# Patient Record
Sex: FEMALE | Race: WHITE | NOT HISPANIC OR LATINO | Employment: UNEMPLOYED
[De-identification: names, ages, dates, MRNs, and addresses within clinical notes are randomized per-mention and may not be internally consistent; named-entity substitution may affect disease eponyms.]

---

## 2019-03-04 ENCOUNTER — RECORDS - HEALTHEAST (OUTPATIENT)
Dept: ADMINISTRATIVE | Facility: OTHER | Age: 39
End: 2019-03-04

## 2019-04-05 ENCOUNTER — RECORDS - HEALTHEAST (OUTPATIENT)
Dept: ADMINISTRATIVE | Facility: OTHER | Age: 39
End: 2019-04-05

## 2019-04-30 ENCOUNTER — RECORDS - HEALTHEAST (OUTPATIENT)
Dept: ADMINISTRATIVE | Facility: OTHER | Age: 39
End: 2019-04-30

## 2019-07-05 ENCOUNTER — RECORDS - HEALTHEAST (OUTPATIENT)
Dept: ADMINISTRATIVE | Facility: OTHER | Age: 39
End: 2019-07-05

## 2019-07-09 ENCOUNTER — RECORDS - HEALTHEAST (OUTPATIENT)
Dept: ADMINISTRATIVE | Facility: OTHER | Age: 39
End: 2019-07-09

## 2019-08-01 ENCOUNTER — RECORDS - HEALTHEAST (OUTPATIENT)
Dept: ADMINISTRATIVE | Facility: OTHER | Age: 39
End: 2019-08-01

## 2019-08-07 ENCOUNTER — RECORDS - HEALTHEAST (OUTPATIENT)
Dept: ADMINISTRATIVE | Facility: OTHER | Age: 39
End: 2019-08-07

## 2019-09-10 ENCOUNTER — RECORDS - HEALTHEAST (OUTPATIENT)
Dept: ADMINISTRATIVE | Facility: OTHER | Age: 39
End: 2019-09-10

## 2019-09-18 ENCOUNTER — RECORDS - HEALTHEAST (OUTPATIENT)
Dept: ADMINISTRATIVE | Facility: OTHER | Age: 39
End: 2019-09-18

## 2019-10-28 ENCOUNTER — RECORDS - HEALTHEAST (OUTPATIENT)
Dept: ADMINISTRATIVE | Facility: OTHER | Age: 39
End: 2019-10-28

## 2019-11-12 ENCOUNTER — RECORDS - HEALTHEAST (OUTPATIENT)
Dept: ADMINISTRATIVE | Facility: OTHER | Age: 39
End: 2019-11-12

## 2020-02-10 ENCOUNTER — RECORDS - HEALTHEAST (OUTPATIENT)
Dept: ADMINISTRATIVE | Facility: OTHER | Age: 40
End: 2020-02-10

## 2020-02-28 ENCOUNTER — RECORDS - HEALTHEAST (OUTPATIENT)
Dept: ADMINISTRATIVE | Facility: OTHER | Age: 40
End: 2020-02-28

## 2020-03-31 ENCOUNTER — RECORDS - HEALTHEAST (OUTPATIENT)
Dept: ADMINISTRATIVE | Facility: OTHER | Age: 40
End: 2020-03-31

## 2020-04-06 ENCOUNTER — RECORDS - HEALTHEAST (OUTPATIENT)
Dept: ADMINISTRATIVE | Facility: OTHER | Age: 40
End: 2020-04-06

## 2020-04-22 ENCOUNTER — RECORDS - HEALTHEAST (OUTPATIENT)
Dept: ADMINISTRATIVE | Facility: OTHER | Age: 40
End: 2020-04-22

## 2021-03-22 ENCOUNTER — OFFICE VISIT (OUTPATIENT)
Dept: FAMILY MEDICINE | Facility: CLINIC | Age: 41
End: 2021-03-22
Payer: COMMERCIAL

## 2021-03-22 VITALS
RESPIRATION RATE: 16 BRPM | HEIGHT: 66 IN | HEART RATE: 66 BPM | DIASTOLIC BLOOD PRESSURE: 66 MMHG | OXYGEN SATURATION: 98 % | BODY MASS INDEX: 27.48 KG/M2 | SYSTOLIC BLOOD PRESSURE: 106 MMHG | WEIGHT: 171 LBS | TEMPERATURE: 97.2 F

## 2021-03-22 DIAGNOSIS — J30.2 SEASONAL ALLERGIC RHINITIS, UNSPECIFIED TRIGGER: ICD-10-CM

## 2021-03-22 DIAGNOSIS — Z13.220 SCREENING CHOLESTEROL LEVEL: ICD-10-CM

## 2021-03-22 DIAGNOSIS — N92.1 MENORRHAGIA WITH IRREGULAR CYCLE: ICD-10-CM

## 2021-03-22 DIAGNOSIS — M06.9 RHEUMATOID ARTHRITIS INVOLVING BOTH HANDS, UNSPECIFIED WHETHER RHEUMATOID FACTOR PRESENT (H): ICD-10-CM

## 2021-03-22 DIAGNOSIS — F79 DISORDER OF INTELLECTUAL DEVELOPMENT: ICD-10-CM

## 2021-03-22 DIAGNOSIS — F43.9 TRAUMA AND STRESSOR-RELATED DISORDER: ICD-10-CM

## 2021-03-22 DIAGNOSIS — E78.00 HIGH CHOLESTEROL: ICD-10-CM

## 2021-03-22 DIAGNOSIS — R73.03 PRE-DIABETES: Primary | ICD-10-CM

## 2021-03-22 DIAGNOSIS — F31.9 BIPOLAR 1 DISORDER (H): ICD-10-CM

## 2021-03-22 DIAGNOSIS — K59.00 CONSTIPATION, UNSPECIFIED CONSTIPATION TYPE: ICD-10-CM

## 2021-03-22 DIAGNOSIS — L40.9 PSORIASIS: ICD-10-CM

## 2021-03-22 PROCEDURE — 90471 IMMUNIZATION ADMIN: CPT | Performed by: FAMILY MEDICINE

## 2021-03-22 PROCEDURE — 99204 OFFICE O/P NEW MOD 45 MIN: CPT | Mod: 25 | Performed by: FAMILY MEDICINE

## 2021-03-22 PROCEDURE — 90715 TDAP VACCINE 7 YRS/> IM: CPT | Performed by: FAMILY MEDICINE

## 2021-03-22 RX ORDER — OLANZAPINE 10 MG/2ML
INJECTION, POWDER, FOR SOLUTION INTRAMUSCULAR
COMMUNITY
Start: 2020-12-02 | End: 2021-03-22

## 2021-03-22 RX ORDER — METHOTREXATE 2.5 MG/1
TABLET ORAL
COMMUNITY
Start: 2021-03-04 | End: 2021-03-31

## 2021-03-22 RX ORDER — MOMETASONE FUROATE 1 MG/ML
SOLUTION TOPICAL DAILY
Qty: 60 ML | Refills: 11 | Status: SHIPPED | OUTPATIENT
Start: 2021-03-22 | End: 2021-03-29

## 2021-03-22 RX ORDER — LORAZEPAM 0.5 MG/1
0.5 TABLET ORAL EVERY MORNING
COMMUNITY
Start: 2021-03-04 | End: 2021-03-22

## 2021-03-22 RX ORDER — TRIAMCINOLONE ACETONIDE 1 MG/G
OINTMENT TOPICAL
COMMUNITY
Start: 2021-02-16 | End: 2021-03-22

## 2021-03-22 RX ORDER — BENZOCAINE/MENTHOL 6 MG-10 MG
LOZENGE MUCOUS MEMBRANE
COMMUNITY
Start: 2021-03-02 | End: 2021-03-29

## 2021-03-22 RX ORDER — LORAZEPAM 0.5 MG/1
0.5 TABLET ORAL DAILY PRN
COMMUNITY
Start: 2021-03-22 | End: 2021-07-21

## 2021-03-22 RX ORDER — TRIAMCINOLONE ACETONIDE 1 MG/G
OINTMENT TOPICAL 3 TIMES DAILY
Qty: 80 G | Refills: 4 | Status: SHIPPED | OUTPATIENT
Start: 2021-03-22 | End: 2021-03-29

## 2021-03-22 RX ORDER — APREMILAST 30 MG/1
TABLET, FILM COATED ORAL
COMMUNITY
Start: 2020-07-15 | End: 2021-07-21

## 2021-03-22 RX ORDER — MULTIVIT-MIN/FA/LYCOPEN/LUTEIN .4-300-25
1 TABLET ORAL DAILY
COMMUNITY
Start: 2021-02-27 | End: 2021-03-24

## 2021-03-22 RX ORDER — CETIRIZINE HYDROCHLORIDE 10 MG/1
10 TABLET ORAL DAILY
Qty: 30 TABLET | Refills: 11 | Status: SHIPPED | OUTPATIENT
Start: 2021-03-22 | End: 2021-03-26

## 2021-03-22 RX ORDER — LORAZEPAM 2 MG/1
2 TABLET ORAL AT BEDTIME
COMMUNITY
Start: 2021-03-04 | End: 2021-04-02

## 2021-03-22 RX ORDER — PROPRANOLOL HYDROCHLORIDE 20 MG/1
20 TABLET ORAL 3 TIMES DAILY
COMMUNITY
Start: 2021-03-02 | End: 2021-03-31

## 2021-03-22 RX ORDER — DROSPIRENONE AND ETHINYL ESTRADIOL TABLETS 0.02-3(28)
1 KIT ORAL DAILY
COMMUNITY
Start: 2021-03-02 | End: 2021-03-22

## 2021-03-22 RX ORDER — POLYETHYLENE GLYCOL 3350 17 G/17G
POWDER, FOR SOLUTION ORAL
Qty: 578 G | Refills: 1 | Status: SHIPPED | OUTPATIENT
Start: 2021-03-22 | End: 2021-03-26

## 2021-03-22 RX ORDER — OLANZAPINE 15 MG/1
TABLET ORAL
COMMUNITY
Start: 2021-03-02 | End: 2021-03-31

## 2021-03-22 RX ORDER — POLYETHYLENE GLYCOL 3350 17 G/17G
POWDER, FOR SOLUTION ORAL
COMMUNITY
Start: 2021-03-02 | End: 2021-03-22

## 2021-03-22 RX ORDER — FLUOXETINE 40 MG/1
20 CAPSULE ORAL DAILY
COMMUNITY
Start: 2021-03-02 | End: 2021-10-19

## 2021-03-22 RX ORDER — OLANZAPINE 10 MG/1
TABLET ORAL
COMMUNITY
Start: 2021-03-02 | End: 2021-03-31

## 2021-03-22 RX ORDER — DROSPIRENONE AND ETHINYL ESTRADIOL TABLETS 0.02-3(28)
1 KIT ORAL DAILY
Qty: 84 TABLET | Refills: 4 | Status: SHIPPED | OUTPATIENT
Start: 2021-03-22 | End: 2021-03-26

## 2021-03-22 RX ORDER — OLANZAPINE 20 MG/1
TABLET ORAL
COMMUNITY
Start: 2020-06-22 | End: 2021-03-22

## 2021-03-22 RX ORDER — SIMVASTATIN 20 MG
20 TABLET ORAL AT BEDTIME
Qty: 30 TABLET | Refills: 11 | Status: SHIPPED | OUTPATIENT
Start: 2021-03-22 | End: 2021-03-26

## 2021-03-22 SDOH — HEALTH STABILITY: MENTAL HEALTH: HOW OFTEN DO YOU HAVE A DRINK CONTAINING ALCOHOL?: NEVER

## 2021-03-22 ASSESSMENT — MIFFLIN-ST. JEOR: SCORE: 1452.4

## 2021-03-22 NOTE — NURSING NOTE
Prior to immunization administration, verified patients identity using patient s name and date of birth. Please see Immunization Activity for additional information.     Screening Questionnaire for Adult Immunization    Are you sick today?   No   Do you have allergies to medications, food, a vaccine component or latex?   No   Have you ever had a serious reaction after receiving a vaccination?   No   Do you have a long-term health problem with heart, lung, kidney, or metabolic disease (e.g., diabetes), asthma, a blood disorder, no spleen, complement component deficiency, a cochlear implant, or a spinal fluid leak?  Are you on long-term aspirin therapy?   No   Do you have cancer, leukemia, HIV/AIDS, or any other immune system problem?   No   Do you have a parent, brother, or sister with an immune system problem?   No   In the past 3 months, have you taken medications that affect  your immune system, such as prednisone, other steroids, or anticancer drugs; drugs for the treatment of rheumatoid arthritis, Crohn s disease, or psoriasis; or have you had radiation treatments?   No   Have you had a seizure, or a brain or other nervous system problem?   No   During the past year, have you received a transfusion of blood or blood    products, or been given immune (gamma) globulin or antiviral drug?   No   For women: Are you pregnant or is there a chance you could become       pregnant during the next month?   No   Have you received any vaccinations in the past 4 weeks?   No     Immunization questionnaire answers were all negative.        Per orders of Dr. Loaiza, injection of TDAP given by Samara Cash CMA. Patient instructed to remain in clinic for 15 minutes afterwards, and to report any adverse reaction to me immediately.       Screening performed by Samara Cash CMA on 3/22/2021 at 12:16 PM.

## 2021-03-22 NOTE — PATIENT INSTRUCTIONS
Waiting list or schedule for COVID vaccine.    Schedule fasting labs [phone]    Walk at home exercise videos    Encourage 30 min 5 days a week (150min a week).    Rheumatology will call you to schedule.    Decrease ativan to 0.5mg daily if needed.    Decrease miralax to 1/2 capful in 4 oz at bedtime.    Cut out juices and pop (sugar); flavored sanford or carbonated water. Decrease creamer and sugar in coffee.        Thank you for your questions about the COVID Vaccine!  It is very important that when your time comes that you should be ready and get your vaccine.    Please visit our webpage for the most up to date information on vaccine availability.  [email or web address]    Why should I get a COVID-19 vaccine?  While we have made some progress in the fight against COVID-19 with public health measures like masking and social distancing, widespread vaccination is the only way that we can stop the pandemic. Not only does getting the vaccine protect you against COVID-19, it also reduces the chances that you will spread it to others, including your family and friends    Can I get COVID-19 from the vaccine? Does it use a live virus?  No, it is not possible to get COVID-19 from the vaccine. The Pfizer and Moderna vaccines use only genetic material from the virus while other vaccines being studied use inactivated versions of the virus. None of these can cause COVID-19.     Is it safe to receive a COVID-19 vaccine? How effective are they?  Two COVID-19 vaccines produced by Pfizer[employer] and Moderna are both safe and more than 94 percent effective, according to the results of two large clinical trials. Both the Pfizer/BioNTech and Moderna vaccines have already received U.S. Food and Drug Administration (FDA) emergency use authorization, allowing them to be used in the [place].    How do the Pfizer/BioNTech and Moderna vaccines work and how many doses do I need?  The Pfizer/[employer]  and Moderna vaccines do not use live or weakened versions of the coronavirus causing COVID-19. Instead, these vaccines have genetic material called mRNA or  messenger RNA  that is taken from the virus. Both vaccines come in two doses. Once you receive both doses of the vaccine, it will likely take several weeks for your body to develop immunity.    What are the side effects?  During the clinical trials, only mild to moderate flu-like side effects which are part of the immune response were reported, including a headache, fatigue, chills, fever, and muscle and joint soreness. Side effects are more likely to occur after the second dose. Most of these symptoms ended three days after the vaccine, or earlier.     If I had COVID-19 should I get the vaccine?  Yes. People who have tested positive for COVID-19 produce antibodies that offer some protection against the virus, but we don t know enough yet about antibody protection and how long it may last, so we recommend that everyone get the vaccine.      Do I have to continue wearing a mask after I get the vaccine?  Yes. Everyone should wear face masks, wash their hands frequently, practice social distancing, and take other safety steps until more people have received the vaccine, the number of COVID-19 cases nationwide is no longer at pandemic levels, and we understand more about how long these vaccines will protect us.    Patient Education     MyPlate Worksheet: 1,800 Calories    Your calorie needs are about 1,800 calories a day. Below are the USDA guidelines for your daily recommended amount of each food group.   Vegetables 2  cups Fruits 1  cups Grains 6 ounces Dairy 3 cups Protein 5 ounces   Eat a variety of vegetables each day.   Aim for these amounts each week:     1  cups dark green vegetables    5  cups red or orange-colored vegetables    1  cups dry beans and peas    5 cups starchy vegetables    4 cups other vegetables Eat a variety of fruits each day.   Go easy on  fruit juices.   Good choices of fruits include:     Berries    Bananas    Apples    Melon    Dry fruit    Frozen fruit    Canned fruit Choose whole grains whenever you can.   Aim to eat at least 3 ounces of whole grains each day:     Bread    Cereal    Rice    Pasta    Potatoes    Tortillas Choose low-fat or fat-free milk, yogurt, or cheese each day.   Good choices include:     Low-fat or fat-free milk or chocolate milk    Low-fat or fat-free yogurt    Low-fat or fat-free cottage cheese or other reduced-fat cheeses    Calcium-fortified milk alternatives Choose low-fat or lean meats, poultry, fish, and seafood each day.   Vary your protein. Choose more:     Fish and other seafood    Lean low-fat meat and poultry    Eggs    Beans, peas    Tofu    Unsalted nuts and seeds  Choose less high-fat and red meat.    Source: USDA MyPlate, www.choosemyplate.gov   Know your limits on saturated fat, added sugars, and salt     Your allowance for saturated fat is 20 grams a day.    Limit added sugars to 45 grams a day.    Cut back on salt (sodium). Stay under 2,300 mg sodium a day. If you have a health condition such as heart disease or high blood pressure, your doctor will likely tell you to limit sodium to no more than 1,500 mg a day.    Get moving and be active!  Aim for at least 30 minutes of physical activity most days of the week or 150 minutes of moderate exercise a week.   MyPlate servings worksheet: 1,800 calories   This worksheet tells you how many servings you should get each day from each food group, and tells you how much food makes a serving. Use this as a guide as you plan your meals throughout the day. Track your progress daily by writing in what you actually ate.   Food group  Daily MyPlate goal  What you ate today    Vegetables 5 half-cups or 5 servings   One serving is:    cup cut-up raw or cooked vegetables   1 cup raw, leafy vegetables     baked sweet potato     cup vegetable juice   Note: At meals, fill half  your plate with vegetables and fruit.       Fruits 3 half-cups or 3 servings   One serving is:    cup fresh, frozen, or canned fruit   1 medium piece of fruit   1 cup of berries or melon     cup dried fruit     cup 100% fruit juice   Note: Make most choices fruit instead of juice.       Grains 6 servings or 6 ounces   One serving is:  1 slice bread  1 cup dry cereal    cup cooked rice, pasta, or cereal   1 5-inch tortilla   Note: Choose whole grains for at least half of your servings each day.       Dairy 3 servings or 3 cups   One serving is:  1 cup milk  1  ounces reduced-fat hard cheese   2 ounces processed cheese   1 cup low-fat yogurt   1/3 cup shredded cheese   Note: Choose low-fat or fat-free most often.       Protein 5 servings or 5 ounces   One serving is:  1 ounce cooked lean beef, pork, lamb, or ham   1 ounce cooked chicken or turkey (no skin)   1 ounce cooked fish or shellfish (not fried)   1 egg    cup egg substitute     ounce nuts or seeds   1 tablespoon peanut or almond butter     cup cooked dry beans or peas     cup tofu  2 tablespoons hummus       [employer] last reviewed this educational content on 6/1/2020 2000-2020 [employer]. All rights reserved. This information is not intended as a substitute for professional medical care. Always follow your healthcare professional's instructions.

## 2021-03-22 NOTE — PROGRESS NOTES
Assessment & Plan     Pre-diabetes  Recheck, has gained weight in the last year with group home changed, discussed 1800 MyPlate food plan and cutting down sweets.  - Hemoglobin A1c  - Comprehensive metabolic panel (BMP + Alb, Alk Phos, ALT, AST, Total. Bili, TP)    Screening cholesterol level  - Lipid panel reflex to direct LDL Fasting    Rheumatoid arthritis involving both hands, unspecified whether rheumatoid factor present (H)  Following with Rheumatology, needs new referral.  - Comprehensive metabolic panel (BMP + Alb, Alk Phos, ALT, AST, Total. Bili, TP)  - Rheumatology Referral; Future    Constipation, unspecified constipation type  Well controlled, plan to decrease to 1/2 dose daily.  - polyethylene glycol (MIRALAX) 17 GM/Dose powder; Mix 1/2 capful (8.5g) in 4 oz of liquid at bedtime.    Menorrhagia with irregular cycle  Stable, refill  - LORYNA 3-0.02 MG tablet; Take 1 tablet by mouth daily    High cholesterol  Recheck and refill  - simvastatin (ZOCOR) 20 MG tablet; Take 1 tablet (20 mg) by mouth At Bedtime    Seasonal allergic rhinitis, unspecified trigger  Stable, refill  - cetirizine (ZYRTEC) 10 MG tablet; Take 1 tablet (10 mg) by mouth daily    Psoriasis  Stable, refill  - triamcinolone (KENALOG) 0.1 % external ointment; Apply topically 3 times daily  - mometasone (ELOCON) 0.1 % external solution; Apply topically daily Apply to scalp and massage at bedtime and wash out in morning.    Bipolar 1 disorder (H)  Seeing psychiatry at [facility], did change 0.5mg ativan to PRN in orders.  - LORazepam (ATIVAN) 0.5 MG tablet; Take 1 tablet (0.5 mg) by mouth daily as needed for anxiety    Disorder of intellectual development  - LORazepam (ATIVAN) 0.5 MG tablet; Take 1 tablet (0.5 mg) by mouth daily as needed for anxiety    Trauma and stressor-related disorder  - LORazepam (ATIVAN) 0.5 MG tablet; Take 1 tablet (0.5 mg) by mouth daily as needed for anxiety    No prior exam note available so took time adding  "in all diagnosis listed on group home diagnosis sheet and medications. Time spent in exam and discussion and filling out forms for patient.  Assessment requiring an independent historian(s) - group home staff  50 minutes spent on the date of the encounter doing chart review, history and exam, documentation and further activities as noted above  {     BMI:   Estimated body mass index is 27.6 kg/m  as calculated from the following:    Height as of this encounter: 1.676 m (5' 6\").    Weight as of this encounter: 77.6 kg (171 lb).   Weight management plan: Discussed healthy diet and exercise guidelines    See Patient Instructions    Return in about 3 months (around 6/22/2021) for weight recheck.    Nicolas Loaiza MD  [facility] GABRIELLA Holden is a 41 year old who presents for the following health issues  accompanied by her Care team staff:    HPI   Chief Complaint   Patient presents with     Establish Care     Forms   New Patient/Transfer of Care  Weight: Patient states she has gained 21lbs in a month. Patient is worried about her weight. Switched group home at last group home had portion control and no extra sweets, at current group home.  When was 220 lbs was pre-diabetic.    Patient also needs paperwork signed.    Bipolar 1: Randal Wallace for psychiatry provider at [facility].  Is seeing her next month, but patient has concern about sedation with morning ativan 0.5mg dose, taking naps after this often.     Psoriasis: well controlled with topical    Allergies Seasonal: stable with the zyrtec daily    Constipation: a little overly controlled with 1 capful miralax daily.  But patient doesn't want it to be a PRN or she will forget and get constipated.    Menorrhagia with irregular cycle. Well controlled with birth control    Hyperlipidemia Follow-Up   High cholesterol due to antipsychotic medication.    Are you regularly taking any medication or supplement to lower your cholesterol?   " "Yes- zocor    Are you having muscle aches or other side effects that you think could be caused by your cholesterol lowering medication?  No        How many servings of fruits and vegetables do you eat daily?  2-3    On average, how many sweetened beverages do you drink each day (Examples: soda, juice, sweet tea, etc.  Do NOT count diet or artificially sweetened beverages)?   2    How many days per week do you exercise enough to make your heart beat faster? 0    How many minutes a day do you exercise enough to make your heart beat faster? 0    How many days per week do you miss taking your medication? 0      Review of Systems   Constitutional, HEENT, cardiovascular, pulmonary, gi and gu systems are negative, except as otherwise noted.      Objective    /66   Pulse 66   Temp 97.2  F (36.2  C) (Tympanic)   Resp 16   Ht 1.676 m (5' 6\")   Wt 77.6 kg (171 lb)   LMP 03/19/2021 (Exact Date)   SpO2 98%   BMI 27.60 kg/m    Body mass index is 27.6 kg/m .  Physical Exam   GENERAL: healthy, alert and no distress  HENT: ear canals and TM's normal, nose and mouth without ulcers or lesions  NECK: no adenopathy, no asymmetry, masses, or scars and thyroid normal to palpation  RESP: lungs clear to auscultation - no rales, rhonchi or wheezes  CV: regular rate and rhythm, normal S1 S2, no S3 or S4, no murmur, click or rub, no peripheral edema and peripheral pulses strong  ABDOMEN: soft, nontender, no hepatosplenomegaly, no masses and bowel sounds normal  MS: no gross musculoskeletal defects noted, no edema  SKIN: no suspicious lesions or rashes  PSYCH: mentation appears normal, affect normal/bright                "

## 2021-03-24 DIAGNOSIS — Z78.9 TAKES DIETARY SUPPLEMENTS: Primary | ICD-10-CM

## 2021-03-24 DIAGNOSIS — N92.1 MENORRHAGIA WITH IRREGULAR CYCLE: ICD-10-CM

## 2021-03-24 DIAGNOSIS — J30.2 SEASONAL ALLERGIC RHINITIS, UNSPECIFIED TRIGGER: ICD-10-CM

## 2021-03-24 DIAGNOSIS — F31.9 BIPOLAR 1 DISORDER (H): ICD-10-CM

## 2021-03-24 DIAGNOSIS — F79 DISORDER OF INTELLECTUAL DEVELOPMENT: ICD-10-CM

## 2021-03-24 DIAGNOSIS — E78.00 HIGH CHOLESTEROL: ICD-10-CM

## 2021-03-24 DIAGNOSIS — F43.9 TRAUMA AND STRESSOR-RELATED DISORDER: ICD-10-CM

## 2021-03-24 DIAGNOSIS — K59.00 CONSTIPATION, UNSPECIFIED CONSTIPATION TYPE: ICD-10-CM

## 2021-03-24 DIAGNOSIS — L40.9 PSORIASIS: ICD-10-CM

## 2021-03-24 NOTE — TELEPHONE ENCOUNTER
margarita from [facility] reports patient's medications were sent to wrong pharmacy, and pharmacy is requesting a   med list for refills.  Fax  [phone]    Needs order for folic acid 1mg;  Not on med list.         Tracey CHILD  Station

## 2021-03-26 RX ORDER — CETIRIZINE HYDROCHLORIDE 10 MG/1
10 TABLET ORAL DAILY
Qty: 30 TABLET | Refills: 11 | Status: SHIPPED | OUTPATIENT
Start: 2021-03-26 | End: 2022-02-08

## 2021-03-26 RX ORDER — POLYETHYLENE GLYCOL 3350 17 G/17G
POWDER, FOR SOLUTION ORAL
Qty: 578 G | Refills: 1 | Status: SHIPPED | OUTPATIENT
Start: 2021-03-26 | End: 2021-04-23

## 2021-03-26 RX ORDER — DROSPIRENONE AND ETHINYL ESTRADIOL TABLETS 0.02-3(28)
1 KIT ORAL DAILY
Qty: 84 TABLET | Refills: 4 | Status: SHIPPED | OUTPATIENT
Start: 2021-03-26 | End: 2021-07-21

## 2021-03-26 RX ORDER — SIMVASTATIN 20 MG
20 TABLET ORAL AT BEDTIME
Qty: 30 TABLET | Refills: 11 | Status: SHIPPED | OUTPATIENT
Start: 2021-03-26 | End: 2022-02-08

## 2021-03-26 NOTE — TELEPHONE ENCOUNTER
Routed to Dr Loaiza.    Wrong pharmacy.  Pt is using [facility].    Prescriptions resent for simvastastin, cetirizine, Lryna, Miralax.    Others do not meet RN refill protocol and some look like they are being filled by a mental health provider.    I cued up folic acid as requested too.    Madie Dalton RN

## 2021-03-29 DIAGNOSIS — M06.9 RHEUMATOID ARTHRITIS INVOLVING BOTH HANDS, UNSPECIFIED WHETHER RHEUMATOID FACTOR PRESENT (H): ICD-10-CM

## 2021-03-29 DIAGNOSIS — Z13.220 SCREENING CHOLESTEROL LEVEL: ICD-10-CM

## 2021-03-29 DIAGNOSIS — R73.03 PRE-DIABETES: ICD-10-CM

## 2021-03-29 LAB
ALBUMIN SERPL-MCNC: 3.4 G/DL (ref 3.4–5)
ALP SERPL-CCNC: 93 U/L (ref 40–150)
ALT SERPL W P-5'-P-CCNC: 25 U/L (ref 0–50)
ANION GAP SERPL CALCULATED.3IONS-SCNC: 5 MMOL/L (ref 3–14)
AST SERPL W P-5'-P-CCNC: 16 U/L (ref 0–45)
BILIRUB SERPL-MCNC: 0.3 MG/DL (ref 0.2–1.3)
BUN SERPL-MCNC: 19 MG/DL (ref 7–30)
CALCIUM SERPL-MCNC: 9.6 MG/DL (ref 8.5–10.1)
CHLORIDE SERPL-SCNC: 105 MMOL/L (ref 94–109)
CHOLEST SERPL-MCNC: 200 MG/DL
CO2 SERPL-SCNC: 25 MMOL/L (ref 20–32)
CREAT SERPL-MCNC: 0.71 MG/DL (ref 0.52–1.04)
GFR SERPL CREATININE-BSD FRML MDRD: >90 ML/MIN/{1.73_M2}
GLUCOSE SERPL-MCNC: 86 MG/DL (ref 70–99)
HBA1C MFR BLD: 5.2 % (ref 0–5.6)
HDLC SERPL-MCNC: 104 MG/DL
LDLC SERPL CALC-MCNC: 69 MG/DL
NONHDLC SERPL-MCNC: 96 MG/DL
POTASSIUM SERPL-SCNC: 4.4 MMOL/L (ref 3.4–5.3)
PROT SERPL-MCNC: 7.6 G/DL (ref 6.8–8.8)
SODIUM SERPL-SCNC: 135 MMOL/L (ref 133–144)
TRIGL SERPL-MCNC: 134 MG/DL

## 2021-03-29 PROCEDURE — 80053 COMPREHEN METABOLIC PANEL: CPT | Performed by: FAMILY MEDICINE

## 2021-03-29 PROCEDURE — 80061 LIPID PANEL: CPT | Performed by: FAMILY MEDICINE

## 2021-03-29 PROCEDURE — 83036 HEMOGLOBIN GLYCOSYLATED A1C: CPT | Performed by: FAMILY MEDICINE

## 2021-03-29 PROCEDURE — 36415 COLL VENOUS BLD VENIPUNCTURE: CPT | Performed by: FAMILY MEDICINE

## 2021-03-29 RX ORDER — METHOTREXATE 2.5 MG/1
TABLET ORAL
Status: CANCELLED | OUTPATIENT
Start: 2021-03-29

## 2021-03-29 RX ORDER — FLUOXETINE 40 MG/1
80 CAPSULE ORAL DAILY
Status: CANCELLED | OUTPATIENT
Start: 2021-03-29

## 2021-03-29 RX ORDER — PROPRANOLOL HYDROCHLORIDE 20 MG/1
20 TABLET ORAL 3 TIMES DAILY
Qty: 90 TABLET | Refills: 11 | Status: CANCELLED | OUTPATIENT
Start: 2021-03-29

## 2021-03-29 RX ORDER — LORAZEPAM 2 MG/1
2 TABLET ORAL AT BEDTIME
Status: CANCELLED | OUTPATIENT
Start: 2021-03-29

## 2021-03-29 RX ORDER — BENZOCAINE/MENTHOL 6 MG-10 MG
LOZENGE MUCOUS MEMBRANE
Qty: 15 G | Refills: 11 | Status: SHIPPED | OUTPATIENT
Start: 2021-03-29 | End: 2021-10-19

## 2021-03-29 RX ORDER — TRIAMCINOLONE ACETONIDE 1 MG/G
OINTMENT TOPICAL 3 TIMES DAILY
Qty: 80 G | Refills: 4 | Status: SHIPPED | OUTPATIENT
Start: 2021-03-29 | End: 2021-09-14

## 2021-03-29 RX ORDER — MOMETASONE FUROATE 1 MG/ML
SOLUTION TOPICAL DAILY
Qty: 60 ML | Refills: 11 | Status: SHIPPED | OUTPATIENT
Start: 2021-03-29 | End: 2022-06-13

## 2021-03-29 RX ORDER — LORAZEPAM 0.5 MG/1
0.5 TABLET ORAL DAILY PRN
Status: CANCELLED | OUTPATIENT
Start: 2021-03-29

## 2021-03-29 RX ORDER — OLANZAPINE 15 MG/1
TABLET ORAL
Status: CANCELLED | OUTPATIENT
Start: 2021-03-29

## 2021-03-29 RX ORDER — OLANZAPINE 10 MG/1
TABLET ORAL
Status: CANCELLED | OUTPATIENT
Start: 2021-03-29

## 2021-03-29 RX ORDER — MULTIVIT-MIN/FA/LYCOPEN/LUTEIN .4-300-25
1 TABLET ORAL DAILY
Qty: 100 TABLET | Refills: 2 | Status: SHIPPED | OUTPATIENT
Start: 2021-03-29 | End: 2021-07-21

## 2021-03-29 RX ORDER — APREMILAST 30 MG/1
TABLET, FILM COATED ORAL
Status: CANCELLED | OUTPATIENT
Start: 2021-03-29

## 2021-03-29 RX ORDER — FOLIC ACID 1 MG/1
1 TABLET ORAL DAILY
Qty: 90 TABLET | Refills: 4 | Status: SHIPPED | OUTPATIENT
Start: 2021-03-29 | End: 2021-07-26

## 2021-03-29 NOTE — TELEPHONE ENCOUNTER
I sent the medication that I can, mostly creams and vitamins.  Patient will need psychiatry to send the mood medication and Rheum to send methotrexate and otezla.  Thank you,  Nicolas Loaiza MD

## 2021-03-31 ENCOUNTER — TELEPHONE (OUTPATIENT)
Dept: FAMILY MEDICINE | Facility: CLINIC | Age: 41
End: 2021-03-31

## 2021-03-31 DIAGNOSIS — F31.9 BIPOLAR 1 DISORDER (H): Primary | ICD-10-CM

## 2021-03-31 DIAGNOSIS — M06.9 RHEUMATOID ARTHRITIS INVOLVING BOTH HANDS, UNSPECIFIED WHETHER RHEUMATOID FACTOR PRESENT (H): ICD-10-CM

## 2021-03-31 DIAGNOSIS — L40.9 PSORIASIS: ICD-10-CM

## 2021-03-31 RX ORDER — OLANZAPINE 10 MG/1
10 TABLET ORAL EVERY MORNING
Qty: 30 TABLET | Refills: 0 | Status: SHIPPED | OUTPATIENT
Start: 2021-03-31 | End: 2021-04-30

## 2021-03-31 RX ORDER — OLANZAPINE 15 MG/1
15 TABLET ORAL AT BEDTIME
Qty: 30 TABLET | Refills: 0 | Status: SHIPPED | OUTPATIENT
Start: 2021-03-31 | End: 2021-04-30

## 2021-03-31 RX ORDER — METHOTREXATE 2.5 MG/1
10 TABLET ORAL WEEKLY
Qty: 40 TABLET | Refills: 0 | Status: SHIPPED | OUTPATIENT
Start: 2021-03-31 | End: 2021-04-30

## 2021-03-31 RX ORDER — PROPRANOLOL HYDROCHLORIDE 20 MG/1
20 TABLET ORAL 3 TIMES DAILY
Qty: 90 TABLET | Refills: 0 | Status: SHIPPED | OUTPATIENT
Start: 2021-03-31 | End: 2021-04-30

## 2021-03-31 NOTE — TELEPHONE ENCOUNTER
Reason for Call:  Medication or medication refill:    Do you use a [facility]?  Name of the pharmacy and phone number for the current request:  [facility] [place] [phone]    Name of the medication requested: Propranolol, 20mg 3x/day; Methyltrexate 2.5mg 10 tab.wk; Xiprexa, Dr. Loaiza ordered 20mg 2x/day, is that correct?     Other request: Patient has psychiatric appt on 4/6/2021, needs meds today    Can we leave a detailed message on this number? YES    Phone number patient can be reached at: Other phone number:  Darvin Wilkinson, [phone]    Best Time: Any    Call taken on 3/31/2021 at 9:14 AM by Chelsie Gill

## 2021-03-31 NOTE — TELEPHONE ENCOUNTER
Medication sent.  Called back Darvin and confirmed dosing she is currently taking for zyprexa.  Tabitha was seen today for refill request.    Diagnoses and all orders for this visit:    Bipolar 1 disorder (H)  -     OLANZapine (ZYPREXA) 15 MG tablet; Take 1 tablet (15 mg) by mouth At Bedtime  -     OLANZapine (ZYPREXA) 10 MG tablet; Take 1 tablet (10 mg) by mouth every morning  -     propranolol (INDERAL) 20 MG tablet; Take 1 tablet (20 mg) by mouth 3 times daily    Psoriasis  -     methotrexate sodium 2.5 MG TABS; Take 10 tablets (25 mg) by mouth once a week    Rheumatoid arthritis involving both hands, unspecified whether rheumatoid factor present (H)  -     methotrexate sodium 2.5 MG TABS; Take 10 tablets (25 mg) by mouth once a week      Thank you,  Nicolas Loaiza MD

## 2021-03-31 NOTE — TELEPHONE ENCOUNTER
Yes, sent in the other refill note from today.  See current doses as they are correct zyprexa 10mg am and 15mg pm.  Thank you,  Nicolas Loaiza MD

## 2021-03-31 NOTE — TELEPHONE ENCOUNTER
Group home calling looking for a courtesy fill until patient can establish with psych and rheumatology    She has appts schedule with speciality but no until.  Psych 04/06  Rheumatology 04/21    When speaking with group home did give Dr rees response from 03/24 refill request. But there's a erasmo between meds between specialists visits and med group home's med supply. Requesting a courtesy fill.    Nicolas Rees MD         11:07 AM  Note     I sent the medication that I can, mostly creams and vitamins.  Patient will need psychiatry to send the mood medication and Rheum to send methotrexate and otezla.  Thank you,  Nciolas Rees MD

## 2021-04-01 ENCOUNTER — TELEPHONE (OUTPATIENT)
Dept: FAMILY MEDICINE | Facility: CLINIC | Age: 41
End: 2021-04-01

## 2021-04-01 DIAGNOSIS — F31.9 BIPOLAR 1 DISORDER (H): Primary | ICD-10-CM

## 2021-04-01 RX ORDER — LORAZEPAM 2 MG/1
2 TABLET ORAL AT BEDTIME
Status: CANCELLED | OUTPATIENT
Start: 2021-04-01

## 2021-04-01 NOTE — TELEPHONE ENCOUNTER
Reason for Call:  Medication or medication refill: Home care is wanting Lorazepam they got all other meds but  They are missing Lorazepam    Do you use a [facility]?  Name of the [facility] and [facility] for the [facility]    Name of the medication requested: Lorazepam      Can we leave a detailed message on this number? YES    Phone number patient can be reached at: Other phone number:  [phone] Nellie    Best Time: any time     Call taken on 4/1/2021 at 12:31 PM by Carmela Ash

## 2021-04-02 DIAGNOSIS — F31.9 BIPOLAR 1 DISORDER (H): ICD-10-CM

## 2021-04-02 DIAGNOSIS — F79 DISORDER OF INTELLECTUAL DEVELOPMENT: ICD-10-CM

## 2021-04-02 DIAGNOSIS — F43.9 TRAUMA AND STRESSOR-RELATED DISORDER: ICD-10-CM

## 2021-04-02 RX ORDER — LORAZEPAM 0.5 MG/1
TABLET ORAL
Qty: 30 TABLET | Refills: 0
Start: 2021-04-02

## 2021-04-02 RX ORDER — LORAZEPAM 2 MG/1
2 TABLET ORAL AT BEDTIME
Qty: 10 TABLET | Refills: 0 | Status: SHIPPED | OUTPATIENT
Start: 2021-04-02 | End: 2021-04-13

## 2021-04-02 RX ORDER — LORAZEPAM 2 MG/1
TABLET ORAL
Qty: 30 TABLET
Start: 2021-04-02

## 2021-04-02 NOTE — TELEPHONE ENCOUNTER
I called Yarelis at [facility] back.    Yarelis explains that pt has pending appt with her new psychiatry provider on 4/6/21 with [facility].      Also, pt is new Dr Loaiza; established care on 3/22.    Dr Loaiza has never prescribed these for pt.    Yarelis is asking for small supply of bedtime lorazepam until pt sees psychiatry?    Madie Dalton RN

## 2021-04-02 NOTE — TELEPHONE ENCOUNTER
Covering for primary/ordering provider    Patient is following with psychiatry and the lorazepam should be ordered by the psychiatrist

## 2021-04-02 NOTE — TELEPHONE ENCOUNTER
Routing refill requests for to provider for review/approval because: Medications are reported/historical & not on refill protocol     Antionette MACDONALD RN, BSN

## 2021-04-02 NOTE — TELEPHONE ENCOUNTER
Patient needs to see her psychiatrist for refills since they are reported as historical and the note states [facility] is managing the medication.  Denied.  Wade Anton MD  Family Medicine

## 2021-04-12 DIAGNOSIS — F31.9 BIPOLAR 1 DISORDER (H): ICD-10-CM

## 2021-04-12 NOTE — TELEPHONE ENCOUNTER
Routing refill request to provider for review/approval because:  Drug not on the [facility] refill protocol     Love Keith RN

## 2021-04-12 NOTE — TELEPHONE ENCOUNTER
Darvin is from [facility], patient is in their care. Darvin is wondering if Dr. Loaiza can prescribe this Rx for short term until patient can be see by a psychiartist on 4/30. Any questions please call Darvin at [phone].  Priscila Tenorio   [facility]  Central Scheduler

## 2021-04-13 RX ORDER — LORAZEPAM 2 MG/1
2 TABLET ORAL AT BEDTIME
Qty: 20 TABLET | Refills: 0 | Status: SHIPPED | OUTPATIENT
Start: 2021-04-13 | End: 2021-07-21

## 2021-04-21 ENCOUNTER — OFFICE VISIT - HEALTHEAST (OUTPATIENT)
Dept: RHEUMATOLOGY | Facility: CLINIC | Age: 41
End: 2021-04-21

## 2021-04-21 ENCOUNTER — AMBULATORY - HEALTHEAST (OUTPATIENT)
Dept: LAB | Facility: CLINIC | Age: 41
End: 2021-04-21

## 2021-04-21 DIAGNOSIS — R41.89 COGNITIVE IMPAIRMENT: ICD-10-CM

## 2021-04-21 DIAGNOSIS — M25.50 POLYARTHRALGIA: ICD-10-CM

## 2021-04-21 DIAGNOSIS — Z79.899 HIGH RISK MEDICATION USE: ICD-10-CM

## 2021-04-21 LAB
ALBUMIN SERPL-MCNC: 3.4 G/DL (ref 3.5–5)
ALT SERPL W P-5'-P-CCNC: 21 U/L (ref 0–45)
C REACTIVE PROTEIN LHE: 0.7 MG/DL (ref 0–0.8)
CREAT SERPL-MCNC: 0.75 MG/DL (ref 0.6–1.1)
ERYTHROCYTE [DISTWIDTH] IN BLOOD BY AUTOMATED COUNT: 14.4 % (ref 11–14.5)
ERYTHROCYTE [SEDIMENTATION RATE] IN BLOOD BY WESTERGREN METHOD: 28 MM/HR (ref 0–20)
GFR SERPL CREATININE-BSD FRML MDRD: >60 ML/MIN/1.73M2
HCT VFR BLD AUTO: 38.7 % (ref 35–47)
HGB BLD-MCNC: 12.6 G/DL (ref 12–16)
MCH RBC QN AUTO: 27.6 PG (ref 27–34)
MCHC RBC AUTO-ENTMCNC: 32.6 G/DL (ref 32–36)
MCV RBC AUTO: 85 FL (ref 80–100)
PLATELET # BLD AUTO: 397 THOU/UL (ref 140–440)
PMV BLD AUTO: 9.3 FL (ref 7–10)
RBC # BLD AUTO: 4.56 MILL/UL (ref 3.8–5.4)
RHEUMATOID FACT SERPL-ACNC: <15 IU/ML (ref 0–30)
URATE SERPL-MCNC: 6.8 MG/DL (ref 2–7.5)
WBC: 12.7 THOU/UL (ref 4–11)

## 2021-04-21 RX ORDER — MULTIVIT WITH IRON,MINERALS
LIQUID (ML) ORAL
Status: SHIPPED | COMMUNITY
Start: 2021-04-21 | End: 2022-02-07

## 2021-04-21 RX ORDER — PROPRANOLOL HYDROCHLORIDE 20 MG/1
20 TABLET ORAL 3 TIMES DAILY
Status: SHIPPED | COMMUNITY
Start: 2021-04-21 | End: 2021-07-21

## 2021-04-21 RX ORDER — LORAZEPAM 2 MG/1
2 TABLET ORAL AT BEDTIME
Status: SHIPPED | COMMUNITY
Start: 2021-04-21 | End: 2021-07-21

## 2021-04-21 RX ORDER — DROSPIRENONE AND ETHINYL ESTRADIOL 0.02-3(28)
1 KIT ORAL DAILY
Status: SHIPPED | COMMUNITY
Start: 2021-04-21 | End: 2022-02-08

## 2021-04-21 RX ORDER — FLUOXETINE 40 MG/1
40 CAPSULE ORAL DAILY
Status: SHIPPED | COMMUNITY
Start: 2021-04-21 | End: 2021-07-21

## 2021-04-21 RX ORDER — FOLIC ACID 1 MG/1
1 TABLET ORAL DAILY
Status: SHIPPED | COMMUNITY
Start: 2021-04-21 | End: 2021-07-21

## 2021-04-21 RX ORDER — SIMVASTATIN 20 MG
20 TABLET ORAL AT BEDTIME
Status: SHIPPED | COMMUNITY
Start: 2021-04-21 | End: 2021-07-21

## 2021-04-21 RX ORDER — OLANZAPINE 15 MG/1
15 TABLET ORAL AT BEDTIME
Status: SHIPPED | COMMUNITY
Start: 2021-04-21 | End: 2021-07-21

## 2021-04-21 RX ORDER — CETIRIZINE HYDROCHLORIDE 10 MG/1
10 TABLET ORAL DAILY
Status: SHIPPED | COMMUNITY
Start: 2021-04-21 | End: 2021-07-21

## 2021-04-21 RX ORDER — MOMETASONE FUROATE 1 MG/G
CREAM TOPICAL DAILY
Status: SHIPPED | COMMUNITY
Start: 2021-04-21 | End: 2021-07-21

## 2021-04-21 RX ORDER — LORAZEPAM 0.5 MG/1
0.5 TABLET ORAL DAILY PRN
Status: SHIPPED | COMMUNITY
Start: 2021-04-21 | End: 2021-07-22

## 2021-04-21 RX ORDER — POLYETHYLENE GLYCOL 3350 17 G/17G
17 POWDER, FOR SOLUTION ORAL DAILY
Status: SHIPPED | COMMUNITY
Start: 2021-04-21 | End: 2021-07-21

## 2021-04-22 ENCOUNTER — TELEPHONE (OUTPATIENT)
Dept: FAMILY MEDICINE | Facility: CLINIC | Age: 41
End: 2021-04-22

## 2021-04-22 DIAGNOSIS — K59.00 CONSTIPATION, UNSPECIFIED CONSTIPATION TYPE: ICD-10-CM

## 2021-04-22 LAB
CCP AB SER IA-ACNC: <0.5 U/ML
HCV AB SERPL QL IA: NEGATIVE

## 2021-04-22 NOTE — TELEPHONE ENCOUNTER
Reason for Call: Request for an order or referral:    Order or referral being requested: Denita care giver is calling asking if PCP can write a order stating that the patient can take miralx PRN and how much and how often through out the day.  Send Rx order to [facility] in [place] and th written order for Group Home to be mailed to the   [address] [place], [place] [id number]    Date needed: at your convenience    Has the patient been seen by the PCP for this problem? YES    Phone number Patient can be reached at:  Home number on file [phone] (home)    Best Time:  any    Can we leave a detailed message on this number?  YES    Call taken on 4/22/2021 at 9:21 AM by Mima Palacios

## 2021-04-26 DIAGNOSIS — M06.9 RHEUMATOID ARTHRITIS INVOLVING BOTH HANDS, UNSPECIFIED WHETHER RHEUMATOID FACTOR PRESENT (H): ICD-10-CM

## 2021-04-26 DIAGNOSIS — L40.9 PSORIASIS: ICD-10-CM

## 2021-04-26 DIAGNOSIS — F31.9 BIPOLAR 1 DISORDER (H): ICD-10-CM

## 2021-04-26 LAB — ANA SER QL: 0.4 U

## 2021-04-26 RX ORDER — POLYETHYLENE GLYCOL 3350 17 G/17G
POWDER, FOR SOLUTION ORAL
Qty: 578 G | Refills: 1 | Status: SHIPPED | OUTPATIENT
Start: 2021-04-26 | End: 2021-06-22

## 2021-04-26 NOTE — TELEPHONE ENCOUNTER
Sent Rx.  Please also print the medication order and fax to group home.  Thank you,  Nicolas Loaiza MD

## 2021-04-29 NOTE — TELEPHONE ENCOUNTER
Dr. Loaiza,    Routing refill request to provider for review/approval because:  Drug not on the [facility] refill protocol Jen ROBLES RN

## 2021-04-30 RX ORDER — OLANZAPINE 10 MG/1
10 TABLET ORAL EVERY MORNING
Qty: 30 TABLET | Refills: 0 | Status: SHIPPED | OUTPATIENT
Start: 2021-04-30 | End: 2021-07-21

## 2021-04-30 RX ORDER — PROPRANOLOL HYDROCHLORIDE 20 MG/1
20 TABLET ORAL 3 TIMES DAILY
Qty: 90 TABLET | Refills: 0 | Status: SHIPPED | OUTPATIENT
Start: 2021-04-30 | End: 2021-05-21

## 2021-04-30 RX ORDER — METHOTREXATE 2.5 MG/1
10 TABLET ORAL WEEKLY
Qty: 40 TABLET | Refills: 0 | Status: SHIPPED | OUTPATIENT
Start: 2021-04-30 | End: 2021-05-26

## 2021-04-30 RX ORDER — OLANZAPINE 15 MG/1
15 TABLET ORAL AT BEDTIME
Qty: 30 TABLET | Refills: 0 | Status: SHIPPED | OUTPATIENT
Start: 2021-04-30 | End: 2021-07-21

## 2021-04-30 NOTE — TELEPHONE ENCOUNTER
Patient only has enough to get to tomorrow can this be refilled.  Bautista staff at [facility]- [phone]    Mima Boo CSS on 4/30/2021 at 9:00 AM

## 2021-05-13 ENCOUNTER — TELEPHONE (OUTPATIENT)
Dept: FAMILY MEDICINE | Facility: CLINIC | Age: 41
End: 2021-05-13

## 2021-05-13 NOTE — TELEPHONE ENCOUNTER
Reason for Call:  Other call back    Detailed comments: LARRY Campbell, requesting Zyprexa in injection form if possible as patient refusing to take meds.     Phone Number Patient can be reached at: Other phone number:  [phone]    Best Time: any    Can we leave a detailed message on this number? YES    Call taken on 5/13/2021 at 12:32 PM by Diane Quintero

## 2021-05-13 NOTE — TELEPHONE ENCOUNTER
Dr. Loaiza,    Spoke to Kristina boudreaux RN at [facility], she says the patient missed the last 4 doses of Zyprexa, 10 mg in the morning and 15 mg in the evening for the past 2 days due to refusing medications. RN says that patient would agree to an IM injection of the Zyprexa to get her back to a baseline as the [facility] is working with psychology in [place] to review a longer acting form of it. They would like you to pend an order for a one time IM injection for the Zyprexa. Will need to call back as [facility] is looking into which pharmacy would fill this, please advise.    LARRY Patel

## 2021-05-14 NOTE — TELEPHONE ENCOUNTER
I called and spoke with staff person at group home.  Yes, pt has established with a new psychiatrist and they have a call out to him.  Kofi Castillo, [facility].  Nothing further is needed at this time.  Madie Dalton RN

## 2021-05-14 NOTE — TELEPHONE ENCOUNTER
This needs to go through psychiatry.  If she doesn't have a psychiatrist to prescribe medications then we can refer to our psychiatrist.  Thank you,  Nicolas Loaiza MD

## 2021-05-21 DIAGNOSIS — F31.9 BIPOLAR 1 DISORDER (H): ICD-10-CM

## 2021-05-21 RX ORDER — PROPRANOLOL HCL 60 MG
80 CAPSULE, EXTENDED RELEASE 24HR ORAL DAILY
COMMUNITY
Start: 2021-05-21 | End: 2022-09-20

## 2021-06-01 ENCOUNTER — TELEPHONE (OUTPATIENT)
Dept: FAMILY MEDICINE | Facility: CLINIC | Age: 41
End: 2021-06-01

## 2021-06-01 NOTE — TELEPHONE ENCOUNTER
Dr. Loaiza,    lorenzoyprexa being handled by psych now.  Needs labs for methotrexate and will follow up with Rheumatology for orders for this also.  Jen ROBLES RN

## 2021-06-03 ENCOUNTER — PATIENT OUTREACH (OUTPATIENT)
Dept: CARE COORDINATION | Facility: CLINIC | Age: 41
End: 2021-06-03

## 2021-06-03 NOTE — PROGRESS NOTES
Clinic Care Coordination Contact  [facility]/Voicemail    Referral Source: PCP    Clinical Data: Care Coordinator Outreach    - questions about [place] orders (would need civil commitment for this)   - transportation resources     Outreach attempted x 1.  Left message on patient's group home contact's voicemail with call back information and requested return call. Noted that clinic needs copy of guardianship paperwork and signed CTC.     Plan: Care Coordinator will try to reach patient again in 1-2 business days.    ADONIS Gooden   Social Work Clinic Care Coordinator   [facility]  [phone]  [email or web address]

## 2021-06-04 NOTE — PROGRESS NOTES
Clinic Care Coordination Contact  Care Team Conversations    SW CC made call to Yogesh, supervisor at group home. Reported things are going good right now. Pt moved in 3/3/21.     Pt currently has a Pearl order. Reported pt started Invega - new medication which is under her Pearl order. Reported his is going really well, doing her second dose today. Pt was taking Zyprexa when she first moved in.     Asif inquired about Pearl order and how to 'enforce' it. Had general questions about 'what ifs.'     Pt's mental health  is Maranda French. Advised Asif that if pt is not taking her medications over a time period, that they would contact Maranda. Maranda is the one who can 'revoke' the stay of commitment and would discuss what happens with pt/group home. The 'revoke' would send pt to hospital.     Asif inquired how to get pt to hospital in this case. They do not think it would be safe for them to drive her if she were to decompensate. Advised to call 911 for evaluation at least, or to take pt to hospital. Yogesh wants to ensure pt's dignity and respect with the community, not have the alarms and lights for the neighbors to see. Advised they can call non emergency police line and see if they can arrange that.     Reported pt has a high staffing ratio at the group home.     CC advised that group home bring a copy of the commitment, Pearl, and guardianship paperwork to clinic to scan to chart.     Asif inquired what else may be helpful for clinic. CC advised that records after psychiatry visits with Dr Baxter [facility] be sent to PCP after visits. Same with Rheumatology records. Fax [phone].     CC inquired about transportation, no needs, they have a vehicle for staff to use.     Qing Winston, [facility]   Social Work Clinic Care Coordinator   [facility]  [phone]  [email or web address]

## 2021-06-05 VITALS — WEIGHT: 180 LBS | DIASTOLIC BLOOD PRESSURE: 70 MMHG | SYSTOLIC BLOOD PRESSURE: 110 MMHG | HEART RATE: 78 BPM

## 2021-06-15 ENCOUNTER — RECORDS - HEALTHEAST (OUTPATIENT)
Dept: ADMINISTRATIVE | Facility: CLINIC | Age: 41
End: 2021-06-15

## 2021-06-15 DIAGNOSIS — L40.50 PSA (PSORIATIC ARTHRITIS) (H): ICD-10-CM

## 2021-06-15 RX ORDER — APREMILAST 30 MG/1
30 TABLET, FILM COATED ORAL 2 TIMES DAILY
Qty: 60 TABLET | Refills: 2 | Status: SHIPPED | OUTPATIENT
Start: 2021-06-15 | End: 2021-08-31

## 2021-06-16 ENCOUNTER — COMMUNICATION - HEALTHEAST (OUTPATIENT)
Dept: RHEUMATOLOGY | Facility: CLINIC | Age: 41
End: 2021-06-16

## 2021-06-16 NOTE — PROGRESS NOTES
This document was created using a software with less than 100% fidelity, at times resulting in unintended, even erroneous syntax and grammar.  The reader is advised to keep this under consideration while reviewing, interpreting this note.    ASSESSMENT AND PLAN:  Tabitha Payton 41 y.o. female is seen here on 04/21/21 for evaluation of an establishment of care for arthropathy, whether this is rheumatoid, or psoriatic is point further information is needed, her note that Otezla was started by her rheumatologist suggested that maybe thinking along the lines of psoriatic arthritis, however her group home records and her own recollection is the diagnosis of rheumatoid arthritis.  At any rate we will request data from her prior rheumatologist, today we will check labs as noted along with the x-rays of the hands.  Diagnoses and all orders for this visit:    Polyarthralgia  -     Rheumatoid Factor Quant  -     CCP Antibodies  -     Hepatitis C Antibody (Anti-HCV)  -     Uric Acid  -     Erythrocyte Sedimentation Rate  -     C-Reactive Protein  -     Antinuclear Antibody (PONCHO) [place]  -     ALT (SGPT); Standing  -     Albumin; Standing  -     Creatinine; Standing  -     HM2(CBC w/o Differential); Standing  -     XR Hands Bilateral 3 or More VWS; Future; Expected date: 04/21/2021  -     XR Hands Bilateral 3 or More VWS    High risk medication use  -     ALT (SGPT); Standing  -     Albumin; Standing  -     Creatinine; Standing  -     HM2(CBC w/o Differential); Standing  -     XR Hands Bilateral 3 or More VWS; Future; Expected date: 04/21/2021  -     XR Hands Bilateral 3 or More VWS    Cognitive impairment      HISTORY OF PRESENTING ILLNESS:  Tabitha Payton, 41 y.o., female is here for evaluation and establishment of care.  She is accompanied by her caregiver from the group home.  She is recently moved to this area, 2 months ago.  She recalls that her symptoms began 3 years ago when she started hurting in the hands.   She understands the diagnosis of rheumatoid arthritis was obtained, with a background of psoriasis for which she is on topicals, she is also on Otezla which she recalls her rheumatologist gave, as well as methotrexate 10 tablets 2.5 mg each every Tuesday she takes folic acid.  She is on birth control.  She reports that there are no residual symptoms or such as pain.  She considers herself a person who has high pain tolerance.  She describes as otherwise in good health.  She carries a diagnosis of bipolar 1 disorder, she is in a group home at [facility].  She has very little information about her family history of medical issues, she does know however that her sister has psoriasis.  She is not a smoker, does not take alcohol.     ALLERGIES:Patient has no known allergies.    PAST MEDICAL/ACTIVE PROBLEMS/MEDICATION/ FAMILY HISTORY/SOCIAL DATA:  The patient has a family history of  No past medical history on file.  Social History     Tobacco Use   Smoking Status Not on file     There is no problem list on file for this patient.    Current Outpatient Medications   Medication Sig Dispense Refill     apremilast (OTEZLA) 30 mg Tab Take 30 mg by mouth 2 (two) times a day.       cetirizine (ZYRTEC) 10 MG tablet Take 10 mg by mouth daily.       drospirenone-ethinyl estradioL (LORYNA, 28,) 3-0.02 mg per tablet Take 1 tablet by mouth daily.       FLUoxetine (PROZAC) 40 MG capsule Take 40 mg by mouth daily.       folic acid (FOLVITE) 1 MG tablet Take 1 mg by mouth daily.       LORazepam (ATIVAN) 0.5 MG tablet Take 0.5 mg by mouth daily as needed for anxiety.       LORazepam (ATIVAN) 2 MG tablet Take 2 mg by mouth at bedtime.       methotrexate (TREXALL) 5 MG tablet Take 2.5 mg by mouth once a week.       mometasone (ELOCON) 0.1 % cream Apply topically daily.       multivit-min-ferrous gluconate 9 mg iron/15 mL Liqd Take by mouth.       OLANZapine (ZYPREXA) 15 MG tablet Take 15 mg by mouth at bedtime.       polyethylene  glycol (MIRALAX) 17 gram packet Take 17 g by mouth daily.       propranoloL (INDERAL) 20 MG tablet Take 20 mg by mouth 3 (three) times a day.       simvastatin (ZOCOR) 20 MG tablet Take 20 mg by mouth at bedtime.       No current facility-administered medications for this visit.        COMPREHENSIVE EXAMINATION:  Vitals:    04/21/21 1311   BP: 110/70   Pulse: 78   Weight: 180 lb (81.6 kg)     A well appearing alert oriented female. Well appearing alert oriented.   Examination of the eyes revealed no redness, obvious scleromalacia.  ENT examination shows no nasal deformity such as of saddle type, external ear without signs of inflamm/deformity ation.  Cardiopulmonary examination without obvious signs of dyspnea, no wheezing is audible, no cyanosis.  There is no finger clubbing.  Skin examination performed for heliotrope, malar area eruption periungual erythema, lupus pernio.  Neurological examination shows the speech is fluent, no facial asymmetry, muscle power in the upper extremities proximally appears to be normal.  In the psychiatric examination the memory, orientation, attention, affect were observable and normal.  Joint examination of the DIPs, PIPs, MCPs, IP joints of the thumbs, wrists, elbows, for swelling, range of motion, for shoulders range of motion evaluated.  She does not have synovitis in any of the palpable joints of upper or lower extremities, she does not have dactylitis of digits or toes or enthesitis such as Achilles.  She has strabismus of the left eye divergent.    LAB / IMAGING DATA:  No results found for: ALT, ALBUMIN, CREATININE    No results found for: WBC, HGB, PLT    No results found for: PONCHO, RF, SEDRATE

## 2021-06-18 ENCOUNTER — PATIENT OUTREACH (OUTPATIENT)
Dept: CARE COORDINATION | Facility: CLINIC | Age: 41
End: 2021-06-18

## 2021-06-18 ENCOUNTER — TELEPHONE (OUTPATIENT)
Dept: FAMILY MEDICINE | Facility: CLINIC | Age: 41
End: 2021-06-18

## 2021-06-18 DIAGNOSIS — N64.52 BREAST DISCHARGE: Primary | ICD-10-CM

## 2021-06-18 NOTE — TELEPHONE ENCOUNTER
Reason for Call:  Other     Detailed comments: Cara from [facility] calling reporting for patients breast discharges and is scheduled for mammogram on 06/30/2021. She was told by radiology tech to request for a diagnostic mammogram instead of regular mammogram.    Phone Number Patient can be reached at: Other phone number: [phone]    Best Time: any    Can we leave a detailed message on this number? YES    Call taken on 6/18/2021 at 1:10 PM by Anna Cohn

## 2021-06-18 NOTE — PROGRESS NOTES
Clinic Care Coordination Contact  Care Team Conversations    SW CC received call from [facility] staff. Yogesh, manager asked her to follow up on last call and what clinic still needs.     Reviewed last call with [facility] manager.     - copy of commitment/Pearl  - copy of guardianship  - complete CTC and send back   - complete HCD and send back   - can fax these to #[phone]  - have specialists send updates and care plans to PCP after visits    ADONIS Gooden   Social Work Clinic Care Coordinator   [facility]  [phone]  [email or web address]

## 2021-06-21 ENCOUNTER — HOSPITAL ENCOUNTER (OUTPATIENT)
Dept: GENERAL RADIOLOGY | Facility: CLINIC | Age: 41
Discharge: HOME | End: 2021-06-21
Attending: INTERNAL MEDICINE | Admitting: INTERNAL MEDICINE
Payer: COMMERCIAL

## 2021-06-21 ENCOUNTER — TELEPHONE (OUTPATIENT)
Dept: FAMILY MEDICINE | Facility: CLINIC | Age: 41
End: 2021-06-21

## 2021-06-21 DIAGNOSIS — K59.00 CONSTIPATION, UNSPECIFIED CONSTIPATION TYPE: ICD-10-CM

## 2021-06-21 DIAGNOSIS — M25.50 POLYARTHRALGIA: ICD-10-CM

## 2021-06-21 DIAGNOSIS — Z79.899 HIGH RISK MEDICATION USE: ICD-10-CM

## 2021-06-21 PROCEDURE — 73130 X-RAY EXAM OF HAND: CPT | Mod: 50

## 2021-06-21 NOTE — TELEPHONE ENCOUNTER
Reason for Call:  Other call back    Detailed comments: Patient's foster care facility called and asked if the doctor can put in a  order for the patients miralax to be PRN please.  When order done please call them at [phone] so they know and then they have to switch the phone to fax so would fax to same number [phone].    Phone Number Patient can be reached at: Cell number on file:    Telephone Information:   Mobile [phone]       Best Time:     Can we leave a detailed message on this number? YES    Call taken on 6/21/2021 at 1:50 PM by Loree Mccluod

## 2021-06-21 NOTE — TELEPHONE ENCOUNTER
"Left message on answering machine for patient to call back.      The sig is\" as needed for constipation \"      Thank you    Shey JIMENEZ RN    "

## 2021-06-22 RX ORDER — POLYETHYLENE GLYCOL 3350 17 G/17G
POWDER, FOR SOLUTION ORAL
Qty: 1 G | Refills: 0 | COMMUNITY
Start: 2021-06-22 | End: 2023-03-06

## 2021-06-22 NOTE — TELEPHONE ENCOUNTER
Denita MACDONALD From patient's group home calling back. Advised of miralax directions:    Sig: Mix 1/2 to 1 full capful daily as needed for constipation or constipation prevention in 8 oz of liquid and drink    Denita requested copy of order be faxed to [phone] (this was completed).     Diane Balderas  Peds Clinic RN

## 2021-06-22 NOTE — TELEPHONE ENCOUNTER
PC from group stating cannot have range for Miralax as staff cannot make assessment.   Requesting order for Miralax 17 Gm Mix 1/2 capful, mix in 8 oz liquid and drink daily as needed for constipation or constipation prevention.  New order placed, faxed to group home.   DANYELL Mancera RN

## 2021-06-23 ENCOUNTER — COMMUNICATION - HEALTHEAST (OUTPATIENT)
Dept: RHEUMATOLOGY | Facility: CLINIC | Age: 41
End: 2021-06-23

## 2021-06-23 ENCOUNTER — RECORDS - HEALTHEAST (OUTPATIENT)
Dept: RHEUMATOLOGY | Facility: CLINIC | Age: 41
End: 2021-06-23

## 2021-06-23 DIAGNOSIS — L40.9 PSORIASIS, UNSPECIFIED: ICD-10-CM

## 2021-06-25 NOTE — TELEPHONE ENCOUNTER
Spoke to Nellie at pts group home, notified her that we need previous Rheum records as there is a question on what pts dx is- RA or PsA prior to Dr Ingram refilling Otezla.     She has MILENA but needs pts guardian to sign it, she will get this signed and fax us and the previous clinic the MILENA to hopefully get these records asap as pt is out of Otezla, took last dose Friday morning.

## 2021-06-25 NOTE — TELEPHONE ENCOUNTER
Received records from pts previous Rheum in [place], [place]. Pt has a dx of Psoriatic Arthritis.     Dr Ingram reviewed and okay'd refilling Otezla.     Nellie-assistant at [facility] notified. Best number for pharmacy to call to schedule shipment is [phone]     Refill sent to [facility] pharmacy

## 2021-06-25 NOTE — TELEPHONE ENCOUNTER
"Prescription refill request for Otezla 30mg. Patient's \"residence\" called stating that pt has 0 pills left. Needs refill for the weekend. Contact info: [facility] provider: [phone]  "

## 2021-06-25 NOTE — TELEPHONE ENCOUNTER
Per Te Ingram didn't prescribe the Otezla, an old rheumatologist did and we are waiting on those records and the patient needs a follow up appt.    The number left below from Denita was a non working number.  I called the number listed in the chart as home number and got manager Yogesh who gave me [phone] and [phone] to try and reach Denita.  I did reach Kamala at [phone] and informed her that we needed Adina's old rheum records and she needs a f/u with Dr. Ingram before we can fill the Otezla.

## 2021-06-26 NOTE — TELEPHONE ENCOUNTER
Denita is calling to get a update diagnoses in writing for the group home.  She can be reached at [phone]. Please call them before faxing the document, so they can prepare for the fax.    Fax:[phone]

## 2021-06-26 NOTE — TELEPHONE ENCOUNTER
Per Dr Emma chavez for pt to continue methotrexate 25mg weekly, pt should have labs in one month. One month supply sent to pharmacy.

## 2021-06-26 NOTE — TELEPHONE ENCOUNTER
Denita- caregiver at group home notified that we can fax a letter stating pts dx of PsA. Confirmed fax number with Denita.     Denita states pt received Otezla rx last week but is now needing refill on methotrexate, pt takes 25mg weekly. Pt is taking Folic Acid 1mg but had this recently refilled by PCP.     Methotrexate needs to be sent to [facility] in [place].

## 2021-06-28 ENCOUNTER — PATIENT OUTREACH (OUTPATIENT)
Dept: CARE COORDINATION | Facility: CLINIC | Age: 41
End: 2021-06-28

## 2021-06-28 NOTE — PROGRESS NOTES
Clinic Care Coordination Contact    RONALDO CC received call from group home. They have gathered information and will fax information to clinic. Per care team, they will not and cannot complete HCD due to pt being under [facility] guardianship.     ADONIS Gooden   Social Work Clinic Care Coordinator   [facility]  [phone]  [email or web address]

## 2021-06-29 ENCOUNTER — TELEPHONE (OUTPATIENT)
Dept: FAMILY MEDICINE | Facility: CLINIC | Age: 41
End: 2021-06-29

## 2021-06-30 NOTE — TELEPHONE ENCOUNTER
Please notify [facility] that they will need to schedule a physical in order to fill out these forms.  We did not do a physical when she established care, so this will work well.  They will need a physical yearly during which I will fill out these forms.  Thank you,  Nicolas Loaiza MD

## 2021-07-04 NOTE — TELEPHONE ENCOUNTER
Telephone Encounter by Tasia Lafleur at 6/16/2021  8:15 AM     Author: Tasia Lafleur Service: -- Author Type: Financial Resource Guide    Filed: 6/16/2021  8:16 AM Encounter Date: 6/16/2021 Status: Signed    : Tasia Lafleur (Financial Resource Guide)       Verified no PA required

## 2021-07-04 NOTE — LETTER
Letter by Jono Ingram MBBS at      Author: Jono Ingram MBBS Service: -- Author Type: --    Filed:  Encounter Date: 6/23/2021 Status: (Other)         Adina Payton  [address] [facility] [address]                              June 23, 2021         Patient: Tabitha Payton   MR Number: [id number]   YOB: 1980   Date of Visit: 6/23/2021       We have received Tabitha's medical records from [facility], Aquiles Lomas, CNP notes confirmed she has been diagnosed and treated for Psoriatic Arthritis. Dr. Ingram will further evaluate Tabitha's symptoms at her next appointment on 7/22/2021.       Any questions, please call [phone].      Sincerely,      Jono Ingram MD  Rheumatologist

## 2021-07-04 NOTE — TELEPHONE ENCOUNTER
Telephone Encounter by Tasia Lafleur at 6/16/2021  7:47 AM     Author: Tasia Lafleur Service: -- Author Type: Financial Resource Guide    Filed: 6/16/2021  7:54 AM Encounter Date: 6/16/2021 Status: Signed    : Tasia Lafleur (Financial Resource Guide)       Medication: Otezla 30mg  Qty: 60 tabs for 30 days  Insurance Company: [facility]  Pharmacy: [facility]   Expected Copay: $0  Copay Card Available: [facility] Assistance Needed/Available: No  Patient Assistance Program Needed: No  Additional Information: group home had called and request medication be sent into [facility].Rx did not stop in my MSOT and went straight to the pharmacy

## 2021-07-05 PROBLEM — L40.9 PSORIASIS: Status: ACTIVE | Noted: 2021-06-23

## 2021-07-05 PROBLEM — L40.50 PSORIATIC ARTHRITIS (H): Status: ACTIVE | Noted: 2021-06-23

## 2021-07-07 ENCOUNTER — COMMUNICATION - HEALTHEAST (OUTPATIENT)
Dept: ADMINISTRATIVE | Facility: CLINIC | Age: 41
End: 2021-07-07

## 2021-07-07 ENCOUNTER — RECORDS - HEALTHEAST (OUTPATIENT)
Dept: ADMINISTRATIVE | Facility: OTHER | Age: 41
End: 2021-07-07

## 2021-07-07 ENCOUNTER — COMMUNICATION - HEALTHEAST (OUTPATIENT)
Dept: RHEUMATOLOGY | Facility: CLINIC | Age: 41
End: 2021-07-07

## 2021-07-07 NOTE — TELEPHONE ENCOUNTER
Telephone Encounter by Gali Quiroz RN at 7/7/2021  2:48 PM     Author: Gali Quiroz RN Service: -- Author Type: Registered Nurse    Filed: 7/7/2021  2:49 PM Encounter Date: 7/7/2021 Status: Signed    : Gali Quiroz RN (Registered Nurse)       Dr Ingram recommends pt take methtorexate 10 tabs (25mg) once weekly in the morning, given circumstances pt can take all at one time. Denita notified and updated order faxed to group [facility] to give methotrexate at 8am.

## 2021-07-07 NOTE — TELEPHONE ENCOUNTER
Telephone Encounter by Yadira Garcia at 7/7/2021 11:49 AM     Author: Yadira Garcia Service: -- Author Type: Patient Access    Filed: 7/7/2021 11:50 AM Encounter Date: 7/7/2021 Status: Signed    : Yadira Garcia (Patient Access)       Denita from patient's group home is calling to see if methotrexate can be changed from being taken at 8 pm to 8 am.

## 2021-07-07 NOTE — TELEPHONE ENCOUNTER
Telephone Encounter by Gali Quiroz RN at 7/7/2021 12:35 PM     Author: Gali Quiroz, RN Service: -- Author Type: Registered Nurse    Filed: 7/7/2021 12:44 PM Encounter Date: 7/7/2021 Status: Signed    : Gali Quiroz RN (Registered Nurse)       Denita notified that pt can take methotrexate in the morning on Tuesday as pt has been refusing to take her PM meds. I did explain that Dr Ingram reocmmends pt split her dose 5 tabs in AM and 5 tabs in PM of Metohtrexate for better absorption.     Denita states pt has refused the last two weeks of methotrexate due to pt not liking PM staff and acting out behaviorally. Will have Dr Ingram advise if pt should split dose or how long between splitting doses there should be.     Denita would need new order sent to pharmacy if sig changes as the meds come in blister packs and she can't take pills out to split them up per their protocol.

## 2021-07-09 ENCOUNTER — COMMUNICATION - HEALTHEAST (OUTPATIENT)
Dept: ADMINISTRATIVE | Facility: CLINIC | Age: 41
End: 2021-07-09

## 2021-07-09 NOTE — TELEPHONE ENCOUNTER
Telephone Encounter by Tara Bacon at 7/9/2021  9:42 AM     Author: Tara Bacon Service: -- Author Type: --    Filed: 7/9/2021  9:44 AM Encounter Date: 7/9/2021 Status: Signed    : Tara Bacon       Fax lab orders to [facility] Fax: [phone]

## 2021-07-12 NOTE — TELEPHONE ENCOUNTER
Telephone Encounter by Gali Quiroz RN at 7/12/2021  9:16 AM     Author: Gali Quiroz RN Service: -- Author Type: Registered Nurse    Filed: 7/12/2021  9:17 AM Encounter Date: 7/9/2021 Status: Signed    : Gali Quiroz RN (Registered Nurse)       Lab orders entered in  EPIC

## 2021-07-16 ENCOUNTER — DOCUMENTATION ONLY (OUTPATIENT)
Dept: OTHER | Facility: CLINIC | Age: 41
End: 2021-07-16

## 2021-07-16 ENCOUNTER — LAB (OUTPATIENT)
Dept: LAB | Facility: CLINIC | Age: 41
End: 2021-07-16
Payer: COMMERCIAL

## 2021-07-16 DIAGNOSIS — M06.9 RHEUMATOID ARTHRITIS INVOLVING BOTH HANDS, UNSPECIFIED WHETHER RHEUMATOID FACTOR PRESENT (H): ICD-10-CM

## 2021-07-16 DIAGNOSIS — M25.50 POLYARTHRALGIA: ICD-10-CM

## 2021-07-16 DIAGNOSIS — Z79.899 HIGH RISK MEDICATION USE: ICD-10-CM

## 2021-07-16 DIAGNOSIS — Z51.81 ENCOUNTER FOR THERAPEUTIC DRUG MONITORING: ICD-10-CM

## 2021-07-16 LAB
ALBUMIN SERPL-MCNC: 3.2 G/DL (ref 3.4–5)
ALT SERPL W P-5'-P-CCNC: 27 U/L (ref 0–50)
CREAT SERPL-MCNC: 0.7 MG/DL (ref 0.52–1.04)
ERYTHROCYTE [DISTWIDTH] IN BLOOD BY AUTOMATED COUNT: 13.9 % (ref 10–15)
GFR SERPL CREATININE-BSD FRML MDRD: >90 ML/MIN/1.73M2
HCT VFR BLD AUTO: 36.8 % (ref 35–47)
HGB BLD-MCNC: 11.9 G/DL (ref 11.7–15.7)
MCH RBC QN AUTO: 27.2 PG (ref 26.5–33)
MCHC RBC AUTO-ENTMCNC: 32.3 G/DL (ref 31.5–36.5)
MCV RBC AUTO: 84 FL (ref 78–100)
PLATELET # BLD AUTO: 323 10E3/UL (ref 150–450)
RBC # BLD AUTO: 4.38 10E6/UL (ref 3.8–5.2)
WBC # BLD AUTO: 12.3 10E3/UL (ref 4–11)

## 2021-07-16 PROCEDURE — 84460 ALANINE AMINO (ALT) (SGPT): CPT

## 2021-07-16 PROCEDURE — 85027 COMPLETE CBC AUTOMATED: CPT

## 2021-07-16 PROCEDURE — 36415 COLL VENOUS BLD VENIPUNCTURE: CPT

## 2021-07-16 PROCEDURE — 82040 ASSAY OF SERUM ALBUMIN: CPT

## 2021-07-16 PROCEDURE — 82565 ASSAY OF CREATININE: CPT

## 2021-07-19 ENCOUNTER — DOCUMENTATION ONLY (OUTPATIENT)
Dept: OTHER | Facility: CLINIC | Age: 41
End: 2021-07-19

## 2021-07-20 NOTE — PATIENT INSTRUCTIONS
Exercise: walking, exercise videos or You Tube.    Follow portions with MyPlate. Ideal to lose weight [id number]  One pound is 3500 calories.  So to lose one pound per week need to cut out 500 calories per day or 3500 calories per week.  Do not count exercise toward or against your calories.  Write down everything that you eat and count calories or use an katherine like [employer]AR[employer] It or MyFitness Pal or website like [employer] or [employer] or [employer]    1500 Calorie Menu: This is a well balanced healthy 1500 calorie menu so you can follow it for as long as you need!    Day 1    Breakfast - 2 whole grain toast, 1 tablespoon of jelly, 1 teaspoon of butter, 1 cup of tea or coffee,   cup of orange juice.  Snack -   bagel, 1 cup of yogurt.  Lunch - 1 oz of sliced turkey or chicken breast, 1 tossed vegetable salad with 1 tablespoon of olive oil and lemon juice, 1 whole grain roll.  Snack -   cup of fresh strawberries, 1 cup of yogurt, 1 tablespoon of granola cereal.  Dinner - 3 oz of beef, grilled or broiled, 1 cup of rice, 1 teaspoon of butter,   cup of steamed carrots, mixed green salad with olive oil and lemon juice.  Snack - 1 apple or orange.    Day 2    Breakfast - 1 orange, 1 cup of whole grain cereal, 1 cup of milk, 1 cup of strawberries.  Snack - 2 teaspoons of peanut butter, 2 rice cakes.  Lunch - 1 cup of vegetable soup, 1 oz of mozzarella cheese, 1 mixed greens salad with olive oil and lemon juice, 1 cup of yogurt, few whole grain crackers.  Snack - 1 apple.  Dinner - 5 oz of white fish, baked, broiled or grilled, 1 baked potato, 1 cup of steamed broccoli, mixed greens salad with oil and lemon juice, 1 whole grain roll.  Snack - 3 cups of plain popcorn.    Day 3    Breakfast - 2 pancakes with 1 tablespoon of maple syrup or fruit spread.  Snack - 1 cup of milk, 1 peach.  Lunch - 6 oz of fish, grilled or broiled, mixed greens salad with 1 tablespoon of olive oil and lemon juice, 1 apple, and few whole grain  crackers.  Snack - 1 granola bar.  Dinner - 2 cup of whole grain pasta,   cup of tomato sauce, mixed greens salad with olive oil and lemon juice.  Snack - 1 cup of milk, few peanuts.    Day 4    Breakfast -   cup of oatmeal, cooked with 1 teaspoons of brown sugar, 1 cup of milk, 1 orange.  Snack - 1 apple, 2 oz of almonds.  Lunch - 1 oz of sliced chicken or turkey breast, 1 teaspoon of mustard, 1 slices of whole wheat bread, 2 slices of tomato, 1   cup of sliced raw vegetables.  Snack -   cup of milk, 1 cup of strawberries.  Dinner - 3 oz skinless chicken breast, baked, broiled or grilled, 1 medium baked potato, 2 teaspoons of butter, mixed greens salad with 1 tablespoon of olive oil and lemon juice.  Snack - 1/2 cup of cottage cheese, 1 pear.    Day 5    Breakfast - 1 whole wheat bagel, 1 table spoon of cream cheese, 1 cup of orange juice.  Snack - 1 cup of yogurt, 1 apple.  Lunch - 2 oz lean hamburger, grilled or broiled, 1 cup of steamed asparagus, large tossed vegetable salad with yogurt dressing,   cup of cottage cheese.  Dinner - 2 cup of pasta with 3 oz of cooked shrimp and 1 /2 cup of broccoli, 1 slice of Italian bread, 1 teaspoon of garlic olive oil, mixed greens salad with oil and vinegar.    Day 6    Breakfast - 1 poached egg, 1 tomato, 1 whole wheat muffin,   grapefruit.  Snack - 1 cup of fruit salad, 1 cup of yogurt, 1 granola bar.  Lunch - 3 oz of sliced turkey or chicken breast, 1 gerry bread, 1 cup of sliced carrots and celery.  Snack - 1 peach,   cup of cottage cheese.  Dinner - 3 oz of cheese, few crackers, 1 mixed greens salad with olive oil and lemon juice, 1 glass of dry red wine.  Snack - 1 cup of fresh strawberries.    Day 7    Breakfast - 1 whole wheat toast, 1 hard boiled egg,   cup of blueberries, 1 cup of yogurt.  Snack - 1 pear, 1 oz of pretzels.  Lunch - 4 oz of cheese, 1 large vegetable salad with olive oil and lemon juice, 1 cup of milk.  Snack -   cup of fruit salad, 1 granola  bar.  Dinner - 3 oz of broiled or baked white fish, 1   cup of rice, 1 cup of steamed vegetables, 2 teaspoons of butter.          Preventive Health Recommendations  Female Ages 40 to 49    Yearly exam:     See your health care provider every year in order to  1. Review health changes.   2. Discuss preventive care.    3. Review your medicines if your doctor prescribed any.      Get a Pap test every three years (unless you have an abnormal result and your provider advises testing more often).      If you get Pap tests with HPV test, you only need to test every 5 years, unless you have an abnormal result. You do not need a Pap test if your uterus was removed (hysterectomy) and you have not had cancer.      You should be tested each year for STDs (sexually transmitted diseases), if you're at risk.     Ask your doctor if you should have a mammogram.      Have a colonoscopy (test for colon cancer) if someone in your family has had colon cancer or polyps before age 50.       Have a cholesterol test every 5 years.       Have a diabetes test (fasting glucose) after age 45. If you are at risk for diabetes, you should have this test every 3 years.    Shots: Get a flu shot each year. Get a tetanus shot every 10 years.     Nutrition:     Eat at least 5 servings of fruits and vegetables each day.    Eat whole-grain bread, whole-wheat pasta and brown rice instead of white grains and rice.    Get adequate Calcium and Vitamin D.      Lifestyle    Exercise at least 150 minutes a week (an average of 30 minutes a day, 5 days a week). This will help you control your weight and prevent disease.    Limit alcohol to one drink per day.    No smoking.     Wear sunscreen to prevent skin cancer.    See your dentist every six months for an exam and cleaning.

## 2021-07-20 NOTE — PROGRESS NOTES
SUBJECTIVE:   CC: Tabitha Payton is an 41 year old woman who presents for preventive health visit.     Patient has been advised of split billing requirements and indicates understanding: Yes  Healthy Habits:    Getting at least 3 servings of Calcium per day:  NO    Bi-annual eye exam:  NO    Dental care twice a year:  NO (scheduled)    Sleep apnea or symptoms of sleep apnea:  Daytime drowsiness (sometimes)    Diet:  Regular (no restrictions)    Frequency of exercise:  None    Duration of exercise:  N/A    Taking medications regularly:  Yes (most of the time)    Barriers to taking medications:  Other (just doesn't want to take them sometimes.)    Medication side effects:  None    PHQ-2 Total Score:    Additional concerns today:  Yes (Forms need to be completed.)    Prescribed glasses, but doesn't wear them.    Bilateral breast/nipple discharge, so planning diagnostic mammo and US    Last pap about 1-2 years ago under anesthesia, never vaginal sex.  Discussed importance of them with her rheumatologic medication, but patient declines paps in future.    Weight gain: if it is in the house, patient will eat it.  Has had pre-diabetes in the past.      Today's PHQ-2 Score:   PHQ-2 ( 1999 Pf[employer]) 7/21/2021   Q1: Little interest or pleasure in doing things 1   Q2: Feeling down, depressed or hopeless 1   PHQ-2 Score 2       Abuse: Current or Past (Physical, Sexual or Emotional) - Declined  Do you feel safe in your environment? Sometimes        Social History     Tobacco Use     Smoking status: Not on file   Substance Use Topics     Alcohol use: Never     If you drink alcohol do you typically have >3 drinks per day or >7 drinks per week? Not applicable    No flowsheet data found.No flowsheet data found.    Reviewed orders with patient.  Reviewed health maintenance and updated orders accordingly - Yes  BP Readings from Last 3 Encounters:   07/21/21 90/62   04/21/21 110/70   03/22/21 106/66    Wt Readings from Last 3  "Encounters:   07/21/21 86.6 kg (191 lb)   04/21/21 81.6 kg (180 lb)   03/22/21 77.6 kg (171 lb)                    Breast Cancer Screening:  Any new diagnosis of family breast, ovarian, or bowel cancer? No    FHS-7: No flowsheet data found.    Mammogram Screening - Offered annual screening and updated Health Maintenance for [place] plan based on risk factor consideration    Pertinent mammograms are reviewed under the imaging tab.    History of abnormal Pap smear: NO - age 30-65 PAP every 5 years with negative HPV co-testing recommended     Reviewed and updated as needed this visit by clinical staff                 Reviewed and updated as needed this visit by Provider                    Review of Systems  CONSTITUTIONAL: NEGATIVE for fever, chills, change in weight  INTEGUMENTARU/SKIN: NEGATIVE for worrisome rashes, moles or lesions  EYES: NEGATIVE for vision changes or irritation  ENT: NEGATIVE for ear, mouth and throat problems  RESP: NEGATIVE for significant cough or SOB  BREAST: NEGATIVE for masses, tenderness or discharge  CV: NEGATIVE for chest pain, palpitations or peripheral edema  GI: NEGATIVE for nausea, abdominal pain, heartburn, or change in bowel habits  : NEGATIVE for unusual urinary or vaginal symptoms. Periods are regular.  MUSCULOSKELETAL: NEGATIVE for significant arthralgias or myalgia  NEURO: NEGATIVE for weakness, dizziness or paresthesias  PSYCHIATRIC: NEGATIVE for changes in mood or affect     OBJECTIVE:   BP 90/62   Pulse 68   Temp 97.3  F (36.3  C) (Tympanic)   Resp 16   Ht 1.708 m (5' 7.25\")   Wt 86.6 kg (191 lb)   LMP 07/15/2021 (Exact Date)   SpO2 97%   BMI 29.69 kg/m    Physical Exam  GENERAL: healthy, alert and no distress  EYES: Eyes grossly normal to inspection, PERRL and conjunctivae and sclerae normal  HENT: ear canals and TM's normal, nose and mouth without ulcers or lesions  NECK: no adenopathy, no asymmetry, masses, or scars and thyroid normal to palpation  RESP: lungs " "clear to auscultation - no rales, rhonchi or wheezes  CV: regular rate and rhythm, normal S1 S2, no S3 or S4, no murmur, click or rub, no peripheral edema and peripheral pulses strong  ABDOMEN: soft, nontender, no hepatosplenomegaly, no masses and bowel sounds normal  MS: no gross musculoskeletal defects noted, no edema  SKIN: no suspicious lesions or rashes  NEURO: Normal strength and tone, mentation intact and speech normal  PSYCH: mentation appears normal, affect normal/bright        ASSESSMENT/PLAN:   Tabitha was seen today for physical.    Diagnoses and all orders for this visit:    Routine general medical examination at a health care facility    Bipolar 1 disorder (H): follows with psychiatry and now on long term invega injections    Disorder of intellectual development    Trauma and stressor-related disorder    Leukocytosis, unspecified type: differential on next labs  -     CBC with platelets and differential; Future    Overweight: plan diabetic low carb diet.[id number] moy per day for weight loss.  Exercise for 30 min 3 times a week.     Patient has been advised of split billing requirements and indicates understanding: Yes  COUNSELING:  Reviewed preventive health counseling, as reflected in patient instructions       Regular exercise       Healthy diet/nutrition       Vision screening       HIV screeninx in teen years, 1x in adult years, and at intervals if high risk    Estimated body mass index is 29.69 kg/m  as calculated from the following:    Height as of this encounter: 1.708 m (5' 7.25\").    Weight as of this encounter: 86.6 kg (191 lb).  Wt Readings from Last 4 Encounters:   21 86.6 kg (191 lb)   21 81.6 kg (180 lb)   21 77.6 kg (171 lb)       Weight management plan: Discussed healthy diet and exercise guidelines    She has no history on file for tobacco use.      Counseling Resources:  ATP IV Guidelines  Pooled Cohorts Equation Calculator  Breast Cancer Risk " Calculator  BRCA-Related Cancer Risk Assessment: FHS-7 Tool  FRAX Risk Assessment  ICSI Preventive Guidelines  Dietary Guidelines for Americans, 2010  USDA's MyPlate  ASA Prophylaxis  Lung CA Screening    Nicolas Loaiza MD  [facility]

## 2021-07-21 ENCOUNTER — TRANSFERRED RECORDS (OUTPATIENT)
Dept: HEALTH INFORMATION MANAGEMENT | Facility: CLINIC | Age: 41
End: 2021-07-21

## 2021-07-21 ENCOUNTER — OFFICE VISIT (OUTPATIENT)
Dept: FAMILY MEDICINE | Facility: CLINIC | Age: 41
End: 2021-07-21
Payer: COMMERCIAL

## 2021-07-21 VITALS
HEIGHT: 67 IN | TEMPERATURE: 97.3 F | DIASTOLIC BLOOD PRESSURE: 62 MMHG | WEIGHT: 191 LBS | RESPIRATION RATE: 16 BRPM | SYSTOLIC BLOOD PRESSURE: 90 MMHG | BODY MASS INDEX: 29.98 KG/M2 | OXYGEN SATURATION: 97 % | HEART RATE: 68 BPM

## 2021-07-21 DIAGNOSIS — Z00.00 ROUTINE GENERAL MEDICAL EXAMINATION AT A HEALTH CARE FACILITY: Primary | ICD-10-CM

## 2021-07-21 DIAGNOSIS — D72.829 LEUKOCYTOSIS, UNSPECIFIED TYPE: ICD-10-CM

## 2021-07-21 DIAGNOSIS — F31.9 BIPOLAR 1 DISORDER (H): ICD-10-CM

## 2021-07-21 DIAGNOSIS — E66.3 OVERWEIGHT (BMI 25.0-29.9): ICD-10-CM

## 2021-07-21 DIAGNOSIS — F43.9 TRAUMA AND STRESSOR-RELATED DISORDER: ICD-10-CM

## 2021-07-21 DIAGNOSIS — F79 DISORDER OF INTELLECTUAL DEVELOPMENT: ICD-10-CM

## 2021-07-21 PROCEDURE — 99396 PREV VISIT EST AGE 40-64: CPT | Performed by: FAMILY MEDICINE

## 2021-07-21 RX ORDER — LORAZEPAM 1 MG/1
1 TABLET ORAL
COMMUNITY
Start: 2021-07-21 | End: 2021-10-19

## 2021-07-21 RX ORDER — DROSPIRENONE AND ETHINYL ESTRADIOL TABLETS 0.02-3(28)
KIT ORAL
COMMUNITY
Start: 2021-03-26 | End: 2021-07-22

## 2021-07-21 RX ORDER — PALIPERIDONE PALMITATE 156 MG/ML
INJECTION INTRAMUSCULAR
COMMUNITY
Start: 2021-06-25 | End: 2022-02-07

## 2021-07-21 ASSESSMENT — MIFFLIN-ST. JEOR: SCORE: 1562.96

## 2021-07-22 ENCOUNTER — VIRTUAL VISIT (OUTPATIENT)
Dept: RHEUMATOLOGY | Facility: CLINIC | Age: 41
End: 2021-07-22
Payer: COMMERCIAL

## 2021-07-22 DIAGNOSIS — Z53.9 ERRONEOUS ENCOUNTER--DISREGARD: Primary | ICD-10-CM

## 2021-07-22 NOTE — LETTER
Letter by Jono Ingram MBBS at      Author: Jono Ingram [employer] Service: -- Author Type: --    Filed:  Encounter Date: 7/7/2021 Status: (Other)         Adina Payton  [address] [facility] [address]                              July 7, 2021    Patient: Tabitha Payton   MR Number: [id number]   YOB: 1980   Date of Visit: 7/7/2021       Methotrexate 10 tablets by mouth (25mg) once weekly can be given in the morning- 8:00am.     Any questions, please call my office at [phone].      Sincerely,      Jono Ingram MD  Rheumatologist

## 2021-07-22 NOTE — PROGRESS NOTES
Adina is a 41 year old who is being evaluated via a billable video visit.      How would you like to obtain your AVS? MyChart  If the video visit is dropped, the invitation should be resent by: Text to cell phone: [phone].  This is the [facility] cell phone.  Uri or Kamala (staff) will be assisting with todays visit.  If [employer] doesn't work, then call house phone of [phone].  They have poor internet service at times.  Will anyone else be joining your video visit? Yes, Uri or Kamala are Behavior Modification Assistants at the [facility] where patient lives.  One of them will be assisting with the visit today.  {If patient encounters technical issues they should call [phone] :[id number]}   Video-Visit Details    Type of service:  Video Visit     Distant Location (provider location):  [facility]      This document was created using a software with less than 100% fidelity, at times resulting in unintended, even erroneous syntax and grammar.  The reader is advised to keep this under consideration while reviewing, interpreting this note.           ASSESSMENT AND PLAN:    There are no diagnoses linked to this encounter.    Follow up in {numbers [id number]} {units:11}    HISTORY OF PRESENTING ILLNESS:        Patient not available          ALLERGIES:Patient has no known allergies.    PAST MEDICAL/ACTIVE PROBLEMS/MEDICATION/SOCIAL DATA  Past Medical History:   Diagnosis Date     Psoriatic arthritis (H) 6/23/2021     Psoriatic arthritis (H) 6/23/2021     History   Smoking Status     Never Smoker   Smokeless Tobacco     Never Used     Patient Active Problem List   Diagnosis     Psoriasis     Psoriatic arthritis (H)     Bipolar 1 disorder (H)     Trauma and stressor-related disorder     Disorder of intellectual development     Overweight (BMI 25.0-29.9)     Current Outpatient Medications   Medication Sig Dispense Refill     apremilast (OTEZLA) 30 mg Tab [APREMILAST  (OTEZLA) 30 MG TAB] Take 1 tablet (30 mg total) by mouth 2 (two) times a day. 60 tablet 2     cetirizine (ZYRTEC) 10 MG tablet Take 1 tablet (10 mg) by mouth daily 30 tablet 11     drospirenone-ethinyl estradioL (LORYNA, 28,) 3-0.02 mg per tablet [DROSPIRENONE-ETHINYL ESTRADIOL (LORYNA, 28,) 3-0.02 MG PER TABLET] Take 1 tablet by mouth daily.       FLUoxetine (PROZAC) 40 MG capsule Take 20 mg by mouth daily 3 tablets daily       folic acid (FOLVITE) 1 MG tablet Take 1 tablet (1 mg) by mouth daily 90 tablet 4     hydrocortisone (CORTAID) 1 % external cream APPLY TO THE AFFECTED AREA(S) FOUR TIMES DAILY AS NEEDED 15 g 11     INVEGA SUSTENNA 156 MG/ML COLETTE injection Inject one hundred fifty six (156) milligrams into the muscle as directed every 28 days please let clinic know if patient refuses       LORazepam (ATIVAN) 1 MG tablet Take 1 tablet (1 mg) by mouth 2 times daily       methotrexate 2.5 MG tablet [METHOTREXATE 2.5 MG TABLET] Take 10 tablets (25 mg) by mouth once a week (Patient taking differently: 2.5 mg In the morning) 40 tablet 0     mometasone (ELOCON) 0.1 % external solution Apply topically daily Apply to scalp and massage at bedtime and wash out in morning. 60 mL 11     multivit-min-ferrous gluconate 9 mg iron/15 mL Liqd [MULTIVIT-MIN-FERROUS GLUCONATE 9 MG IRON/15 ML LIQD] Take by mouth.       polyethylene glycol (MIRALAX) 17 GM/Dose powder Mix 1/2 capful daily as needed for constipation or constipation prevention in 8 oz of liquid and drink. 1 g 0     propranolol ER (INDERAL LA) 60 MG 24 hr capsule Take 80 mg by mouth daily        simvastatin (ZOCOR) 20 MG tablet Take 1 tablet (20 mg) by mouth At Bedtime 30 tablet 11     triamcinolone (KENALOG) 0.1 % external ointment Apply topically 3 times daily (Patient not taking: Reported on 7/22/2021) 80 g 4         EXAMINATION:    Using the audio and video link as best as possible the constitutional, neck, neurologic, psych, skin, both upper extremities  areas/organ system were evaluated during this assessment.  Some of the important findings: Alert, oriented, speech fluent.  *** Able to fully flex the digits, into fists bilaterally, wrist and elbow range of motion appear normal, abduction of the shoulder is normal.      LAB / IMAGING DATA:  ALT   Date Value Ref Range Status   07/16/2021 27 0 - 50 U/L Final   04/21/2021 21 0 - 45 U/L Final   03/29/2021 25 0 - 50 U/L Final     Albumin   Date Value Ref Range Status   07/16/2021 3.2 (L) 3.4 - 5.0 g/dL Final   04/21/2021 3.4 (L) 3.5 - 5.0 g/dL Final   03/29/2021 3.4 3.4 - 5.0 g/dL Final       WBC   Date Value Ref Range Status   04/21/2021 12.7 (H) 4.0 - 11.0 thou/uL Final     WBC Count   Date Value Ref Range Status   07/16/2021 12.3 (H) 4.0 - 11.0 10e3/uL Final     Hemoglobin   Date Value Ref Range Status   07/16/2021 11.9 11.7 - 15.7 g/dL Final   04/21/2021 12.6 12.0 - 16.0 g/dL Final     Platelet Count   Date Value Ref Range Status   07/16/2021 323 150 - 450 10e3/uL Final   04/21/2021 397 140 - 440 thou/uL Final       No results found for: PONCHO

## 2021-07-26 ENCOUNTER — VIRTUAL VISIT (OUTPATIENT)
Dept: RHEUMATOLOGY | Facility: CLINIC | Age: 41
End: 2021-07-26
Payer: COMMERCIAL

## 2021-07-26 DIAGNOSIS — R41.89 COGNITIVE IMPAIRMENT: ICD-10-CM

## 2021-07-26 DIAGNOSIS — Z79.899 HIGH RISK MEDICATION USE: ICD-10-CM

## 2021-07-26 DIAGNOSIS — L40.50 PSORIATIC ARTHRITIS (H): Primary | ICD-10-CM

## 2021-07-26 DIAGNOSIS — L40.9 PSORIASIS: ICD-10-CM

## 2021-07-26 PROCEDURE — 99213 OFFICE O/P EST LOW 20 MIN: CPT | Mod: TEL | Performed by: INTERNAL MEDICINE

## 2021-07-26 RX ORDER — FOLIC ACID 1 MG/1
1 TABLET ORAL DAILY
Qty: 90 TABLET | Refills: 4 | Status: SHIPPED | OUTPATIENT
Start: 2021-07-26 | End: 2022-08-04

## 2021-07-26 RX ORDER — METHOTREXATE 2.5 MG/1
10 TABLET ORAL WEEKLY
Qty: 120 TABLET | Refills: 0 | Status: SHIPPED | OUTPATIENT
Start: 2021-07-26 | End: 2021-10-18

## 2021-07-26 NOTE — PROGRESS NOTES
Tabitha is a 41 year old female presents today for follow-up.        Diagnoses and all orders for this visit:    Psoriatic arthritis (H)  -     methotrexate sodium 2.5 MG TABS; Take 10 tablets (25 mg) by mouth once a week    Psoriasis  -     methotrexate sodium 2.5 MG TABS; Take 10 tablets (25 mg) by mouth once a week    Cognitive impairment    High risk medication use  -     folic acid (FOLVITE) 1 MG tablet; Take 1 tablet (1 mg) by mouth daily            Follow up in 3 months.        HPI  Tabitha Payton is a 41 year old femaleis here for follow-up.  She has psoriatic arthritis, at one point she was thought to have psoriasis and seronegative rheumatoid arthritis towards elsewhere she has moved to this area about 4 months ago.  She is a group home.  She is on 25 mg of methotrexate per week, she takes folic acid, Otezla.  Her recent labs are reviewed with her within acceptable range.  She reports no flareup of her joint symptoms during the past several months.  There is no stiffness in the morning.  She does not experience swelling.  She noted that psoriasis is under control with the current regimen.  She is able to do all things physical that she is expected to do.  She works at a local [employer].  She does not think her depression is gotten worse with Otezla.  There are no GI side effects.  She was assisted in today's conversation by another group home attendants.  This is on the phone.      She is to continue current regimen.  Follow-up in 3 months with labs prior      Following is the excerpt from a previous note:    here for evaluation and establishment of care.  She is accompanied by her caregiver from the group home.  She is recently moved to this area, 2 months ago.  She recalls that her symptoms began 3 years ago when she started hurting in the hands.  She understands the diagnosis of rheumatoid arthritis was obtained, with a background of psoriasis for which she is on topicals, she is also on Otezla  which she recalls her rheumatologist gave, as well as methotrexate 10 tablets 2.5 mg each every Tuesday she takes folic acid.  She is on birth control.  She reports that there are no residual symptoms or such as pain.  She considers herself a person who has high pain tolerance.  She describes as otherwise in good health.  She carries a diagnosis of bipolar 1 disorder, she is in a group home at [facility].  She has very little information about her family history of medical issues, she does know however that her sister has psoriasis.  She is not a smoker, does not take alcohol.                     Patient Active Problem List    Diagnosis Date Noted     Bipolar 1 disorder (H) 07/21/2021     Priority: Medium     Trauma and stressor-related disorder 07/21/2021     Priority: Medium     Disorder of intellectual development 07/21/2021     Priority: Medium     Overweight (BMI 25.0-29.9) 07/21/2021     Priority: Medium     Psoriasis 06/23/2021     Priority: Medium     Psoriatic arthritis (H) 06/23/2021     Priority: Medium     Past Surgical History:   Procedure Laterality Date     deviated septum  2000      Past Medical History:   Diagnosis Date     Psoriatic arthritis (H) 6/23/2021     Psoriatic arthritis (H) 6/23/2021     No Known Allergies  Current Outpatient Medications   Medication Sig Dispense Refill     apremilast (OTEZLA) 30 mg Tab [APREMILAST (OTEZLA) 30 MG TAB] Take 1 tablet (30 mg total) by mouth 2 (two) times a day. 60 tablet 2     cetirizine (ZYRTEC) 10 MG tablet Take 1 tablet (10 mg) by mouth daily 30 tablet 11     drospirenone-ethinyl estradioL (LORYNA, 28,) 3-0.02 mg per tablet [DROSPIRENONE-ETHINYL ESTRADIOL (LORYNA, 28,) 3-0.02 MG PER TABLET] Take 1 tablet by mouth daily.       FLUoxetine (PROZAC) 40 MG capsule Take 20 mg by mouth daily 3 tablets daily       folic acid (FOLVITE) 1 MG tablet Take 1 tablet (1 mg) by mouth daily 90 tablet 4     hydrocortisone (CORTAID) 1 % external cream APPLY TO  THE AFFECTED AREA(S) FOUR TIMES DAILY AS NEEDED 15 g 11     INVEGA SUSTENNA 156 MG/ML COLETTE injection Inject one hundred fifty six (156) milligrams into the muscle as directed every 28 days please let clinic know if patient refuses       LORazepam (ATIVAN) 1 MG tablet Take 1 tablet (1 mg) by mouth 2 times daily       methotrexate 2.5 MG tablet [METHOTREXATE 2.5 MG TABLET] Take 10 tablets (25 mg) by mouth once a week (Patient taking differently: 2.5 mg In the morning) 40 tablet 0     methotrexate sodium 2.5 MG TABS Take 10 tablets (25 mg) by mouth once a week 120 tablet 0     mometasone (ELOCON) 0.1 % external solution Apply topically daily Apply to scalp and massage at bedtime and wash out in morning. 60 mL 11     multivit-min-ferrous gluconate 9 mg iron/15 mL Liqd [MULTIVIT-MIN-FERROUS GLUCONATE 9 MG IRON/15 ML LIQD] Take by mouth.       polyethylene glycol (MIRALAX) 17 GM/Dose powder Mix 1/2 capful daily as needed for constipation or constipation prevention in 8 oz of liquid and drink. 1 g 0     propranolol ER (INDERAL LA) 60 MG 24 hr capsule Take 80 mg by mouth daily        simvastatin (ZOCOR) 20 MG tablet Take 1 tablet (20 mg) by mouth At Bedtime 30 tablet 11     triamcinolone (KENALOG) 0.1 % external ointment Apply topically 3 times daily 80 g 4     family history includes Unknown/Adopted in her mother.     Social Connections:      Frequency of Communication with Friends and Family:      Frequency of Social Gatherings with Friends and Family:      Attends Congregational Services:      Active Member of Clubs or Organizations:      Attends Club or Organization Meetings:      Marital Status:         DETAILED EXAMINATION  07/26/21  :  There were no vitals filed for this visit.   On the phone she sounded comfortable, alert, oriented, her speech was fluent.  Her memory recall appeared normal.  She did not sound like she was in pain or short of breath.          WBC Count   Date Value Ref Range Status   07/16/2021 12.3 (H)  4.0 - 11.0 10e3/uL Final     RBC Count   Date Value Ref Range Status   07/16/2021 4.38 3.80 - 5.20 10e6/uL Final     Hemoglobin   Date Value Ref Range Status   07/16/2021 11.9 11.7 - 15.7 g/dL Final     Hematocrit   Date Value Ref Range Status   07/16/2021 36.8 35.0 - 47.0 % Final     MCV   Date Value Ref Range Status   07/16/2021 84 78 - 100 fL Final     MCH   Date Value Ref Range Status   07/16/2021 27.2 26.5 - 33.0 pg Final     Platelet Count   Date Value Ref Range Status   07/16/2021 323 150 - 450 10e3/uL Final     AST   Date Value Ref Range Status   03/29/2021 16 0 - 45 U/L Final     ALT   Date Value Ref Range Status   07/16/2021 27 0 - 50 U/L Final   03/29/2021 25 0 - 50 U/L Final     Albumin   Date Value Ref Range Status   07/16/2021 3.2 (L) 3.4 - 5.0 g/dL Final   03/29/2021 3.4 3.4 - 5.0 g/dL Final     Alkaline Phosphatase   Date Value Ref Range Status   03/29/2021 93 40 - 150 U/L Final     Creatinine   Date Value Ref Range Status   07/16/2021 0.70 0.52 - 1.04 mg/dL Final   03/29/2021 0.71 0.52 - 1.04 mg/dL Final     GFR Estimate   Date Value Ref Range Status   07/16/2021 >90 >60 mL/min/1.73m2 Final     Comment:     As of July 11, 2021, eGFR is calculated by the CKD-EPI creatinine equation, without race adjustment. eGFR can be influenced by muscle mass, exercise, and diet. The reported eGFR is an estimation only and is only applicable if the renal function is stable.   04/21/2021 >60 >60 mL/min/1.73m2 Final   03/29/2021 >90 >60 mL/min/[1.73_m2] Final     Comment:     Non  GFR Calc  Starting 12/18/2018, serum creatinine based estimated GFR (eGFR) will be   calculated using the Chronic Kidney Disease Epidemiology Collaboration   (CKD-EPI) equation.       GFR Estimate If Black   Date Value Ref Range Status   04/21/2021 >60 >60 mL/min/1.73m2 Final   03/29/2021 >90 >60 mL/min/[1.73_m2] Final     Comment:      GFR Calc  Starting 12/18/2018, serum creatinine based estimated GFR  (eGFR) will be   calculated using the Chronic Kidney Disease Epidemiology Collaboration   (CKD-EPI) equation.       Erythrocyte Sedimentation Rate   Date Value Ref Range Status   04/21/2021 28 (H) 0 - 20 mm/hr Final     CRP   Date Value Ref Range Status   04/21/2021 0.7 0.0 - 0.8 mg/dL Final

## 2021-07-27 ENCOUNTER — TELEPHONE (OUTPATIENT)
Dept: RHEUMATOLOGY | Facility: CLINIC | Age: 41
End: 2021-07-27

## 2021-07-27 NOTE — TELEPHONE ENCOUNTER
Tabitha's group home is calling to see if you received the after visit form.  Please fill it out and fax it to [phone]

## 2021-07-28 ENCOUNTER — TRANSFERRED RECORDS (OUTPATIENT)
Dept: HEALTH INFORMATION MANAGEMENT | Facility: CLINIC | Age: 41
End: 2021-07-28

## 2021-08-04 ENCOUNTER — OFFICE VISIT (OUTPATIENT)
Dept: URGENT CARE | Facility: URGENT CARE | Age: 41
End: 2021-08-04
Payer: COMMERCIAL

## 2021-08-04 VITALS
HEIGHT: 66 IN | RESPIRATION RATE: 16 BRPM | SYSTOLIC BLOOD PRESSURE: 98 MMHG | DIASTOLIC BLOOD PRESSURE: 60 MMHG | BODY MASS INDEX: 29.41 KG/M2 | TEMPERATURE: 98 F | OXYGEN SATURATION: 97 % | WEIGHT: 183 LBS | HEART RATE: 70 BPM

## 2021-08-04 DIAGNOSIS — N93.8 DUB (DYSFUNCTIONAL UTERINE BLEEDING): ICD-10-CM

## 2021-08-04 DIAGNOSIS — R30.0 DYSURIA: Primary | ICD-10-CM

## 2021-08-04 LAB
ALBUMIN UR-MCNC: NEGATIVE MG/DL
APPEARANCE UR: CLEAR
BILIRUB UR QL STRIP: NEGATIVE
COLOR UR AUTO: YELLOW
ERYTHROCYTE [DISTWIDTH] IN BLOOD BY AUTOMATED COUNT: 14.2 % (ref 10–15)
GLUCOSE UR STRIP-MCNC: NEGATIVE MG/DL
HCG UR QL: NEGATIVE
HCT VFR BLD AUTO: 37.1 % (ref 35–47)
HGB BLD-MCNC: 12.5 G/DL (ref 11.7–15.7)
HGB UR QL STRIP: NEGATIVE
KETONES UR STRIP-MCNC: NEGATIVE MG/DL
LEUKOCYTE ESTERASE UR QL STRIP: NEGATIVE
MCH RBC QN AUTO: 28 PG (ref 26.5–33)
MCHC RBC AUTO-ENTMCNC: 33.7 G/DL (ref 31.5–36.5)
MCV RBC AUTO: 83 FL (ref 78–100)
NITRATE UR QL: NEGATIVE
PH UR STRIP: 5.5 [PH] (ref 5–7)
PLATELET # BLD AUTO: 348 10E3/UL (ref 150–450)
RBC # BLD AUTO: 4.46 10E6/UL (ref 3.8–5.2)
RBC #/AREA URNS AUTO: ABNORMAL /HPF
SP GR UR STRIP: 1.01 (ref 1–1.03)
SQUAMOUS #/AREA URNS AUTO: ABNORMAL /LPF
UROBILINOGEN UR STRIP-ACNC: 0.2 E.U./DL
WBC # BLD AUTO: 11.7 10E3/UL (ref 4–11)
WBC #/AREA URNS AUTO: ABNORMAL /HPF

## 2021-08-04 PROCEDURE — 36415 COLL VENOUS BLD VENIPUNCTURE: CPT

## 2021-08-04 PROCEDURE — 81025 URINE PREGNANCY TEST: CPT

## 2021-08-04 PROCEDURE — 99213 OFFICE O/P EST LOW 20 MIN: CPT

## 2021-08-04 PROCEDURE — 81001 URINALYSIS AUTO W/SCOPE: CPT

## 2021-08-04 PROCEDURE — 85027 COMPLETE CBC AUTOMATED: CPT

## 2021-08-04 ASSESSMENT — MIFFLIN-ST. JEOR: SCORE: 1506.83

## 2021-08-04 NOTE — PROGRESS NOTES
S: 41-year-old female with a history of bipolar disorder presents with reports of mild vaginal spotting.  She is a poor historian but it appears that she has had slight vaginal spotting off and on for the past several months.  She notes that she gets periods for 2 days in duration monthly.  ROS: Strongly denies pregnancy.  She denies any dysuria.  She denies fever or abdominal pain.    Meds: Otezla, Zyrtec, Flovent, methotrexate, Zocor, Elocon, and Kenalog    O: Temperature 98.0, blood pressure 98/60, pulse 70 respirations 16  Alert conversant no acute distress  Skin Pink and dry  Psych orient x3 alert and somewhat argumentative  Abdomen soft nontender no masses no guarding no rebound    UA-negative    A: Dysfunctional uterine bleeding    P: Her spotting is scant, and clinically she appears stable.  Will order pelvic ultrasound, urine pregnancy, and CBC, and asked her to follow-up with Dr. Loaiza within 1 week.  Caregiver and patient agreed.  Form for her care team filled out.

## 2021-08-09 ENCOUNTER — HOSPITAL ENCOUNTER (OUTPATIENT)
Dept: ULTRASOUND IMAGING | Facility: CLINIC | Age: 41
End: 2021-08-09
Attending: FAMILY MEDICINE
Payer: COMMERCIAL

## 2021-08-09 ENCOUNTER — HOSPITAL ENCOUNTER (OUTPATIENT)
Dept: MAMMOGRAPHY | Facility: CLINIC | Age: 41
End: 2021-08-09
Attending: FAMILY MEDICINE
Payer: COMMERCIAL

## 2021-08-09 DIAGNOSIS — N64.52 BREAST DISCHARGE: ICD-10-CM

## 2021-08-09 PROCEDURE — 76642 ULTRASOUND BREAST LIMITED: CPT | Mod: 50

## 2021-08-09 PROCEDURE — 77062 BREAST TOMOSYNTHESIS BI: CPT

## 2021-08-17 ENCOUNTER — NURSE TRIAGE (OUTPATIENT)
Dept: NURSING | Facility: CLINIC | Age: 41
End: 2021-08-17

## 2021-08-17 NOTE — TELEPHONE ENCOUNTER
Caller: Cara ([facility]) - skill     Cara calls to inform that Tabitha has:  - low appetite x 4 days  - diarrhea x 3 days  - cough  - runny nose  - fever 101.1F    Received her J&J shot on 8/12.    COVID 19 Nurse Triage Plan/Patient Instructions    Please be aware that novel coronavirus (COVID-19) may be circulating in the community. If you develop symptoms such as fever, cough, or SOB or if you have concerns about the presence of another infection including coronavirus (COVID-19), please contact your health care provider or visit [email or web address].     Disposition/Instructions    Virtual Visit with provider recommended. Reference Visit Selection Guide. Recommendation: within 24 hours.  Caller agreed to schedule tomorrow as there are no openings today.  Also advised about testing at local pharmacies.     Thank you for taking steps to prevent the spread of this virus.  o Limit your contact with others.  o Wear a simple mask to cover your cough.  o Wash your hands well and often.    Resources    [facility]: About COVID-19: [email or web address]    CDC: What to Do If You're Sick: www.cdc.gov/coronavirus/2019-ncov/about/steps-when-sick.html    CDC: Ending Home Isolation: www.cdc.gov/coronavirus/2019-ncov/hcp/disposition-in-home-patients.html     CDC: Caring for Someone: www.cdc.gov/coronavirus/2019-ncov/if-you-are-sick/care-for-someone.html     [facility]: Interim Guidance for Hospital Discharge to Home: www.health.[facility].mn.us/diseases/coronavirus/hcp/hospdischarge.pdf    [facility] clinical trials (COVID-19 research studies): [email or web address]     Below are the COVID-19 hotlines at the [place] Department of Health ([facility]). Interpreters are available.   o For health questions: Call 104-588-3557 or 1-639.363.6865 (7 a.m. to 7 p.m.)  o For questions about schools and childcare: Call 644-549-3948 or 1-631.653.8486 (7 a.m. to 7 p.m.)      Karo Aaron RN/[place] Nurse Advisor                  Reason for Disposition    [1] COVID-19 infection suspected by caller or triager AND [2] mild symptoms (cough, fever, or others) AND [3] no complications or SOB    Additional Information    Negative: SEVERE difficulty breathing (e.g., struggling for each breath, speaks in single words)    Negative: Difficult to awaken or acting confused (e.g., disoriented, slurred speech)    Negative: Bluish (or gray) lips or face now    Negative: Shock suspected (e.g., cold/pale/clammy skin, too weak to stand, low BP, rapid pulse)    Negative: Sounds like a life-threatening emergency to the triager    Negative: SEVERE or constant chest pain or pressure (Exception: mild central chest pain, present only when coughing)    Negative: MODERATE difficulty breathing (e.g., speaks in phrases, SOB even at rest, pulse 100-120)    Negative: [1] Headache AND [2] stiff neck (can't touch chin to chest)    Negative: MILD difficulty breathing (e.g., minimal/no SOB at rest, SOB with walking, pulse <100)    Negative: Chest pain or pressure    Negative: Patient sounds very sick or weak to the triager    Negative: Fever > 103 F (39.4 C)    Negative: [1] Fever > 101 F (38.3 C) AND [2] age > 60 years    Negative: [1] Fever > 100.0 F (37.8 C) AND [2] bedridden (e.g., nursing home patient, CVA, chronic illness, recovering from surgery)    Negative: [1] HIGH RISK patient (e.g., age > 64 years, diabetes, heart or lung disease, weak immune system) AND [2] new or worsening symptoms    Negative: [1] HIGH RISK patient AND [2] influenza is widespread in the community AND [3] ONE OR MORE respiratory symptoms: cough, sore throat, runny or stuffy nose    Negative: [1] HIGH RISK patient AND [2] influenza exposure within the last 7 days AND [3] ONE OR MORE respiratory symptoms: cough, sore throat, runny or stuffy nose    Negative: Fever present > 3 days (72 hours)    Negative: [1] Fever returns after gone  for over 24 hours AND [2] symptoms worse or not improved    Negative: [1] Continuous (nonstop) coughing interferes with work or school AND [2] no improvement using cough treatment per protocol    Protocols used: CORONAVIRUS (COVID-19) DIAGNOSED OR [id number] 3.25

## 2021-08-18 ENCOUNTER — VIRTUAL VISIT (OUTPATIENT)
Dept: URGENT CARE | Facility: CLINIC | Age: 41
End: 2021-08-18
Payer: COMMERCIAL

## 2021-08-18 DIAGNOSIS — Z20.822 SUSPECTED COVID-19 VIRUS INFECTION: ICD-10-CM

## 2021-08-18 DIAGNOSIS — Z20.822 SUSPECTED COVID-19 VIRUS INFECTION: Primary | ICD-10-CM

## 2021-08-18 PROCEDURE — U0003 INFECTIOUS AGENT DETECTION BY NUCLEIC ACID (DNA OR RNA); SEVERE ACUTE RESPIRATORY SYNDROME CORONAVIRUS 2 (SARS-COV-2) (CORONAVIRUS DISEASE [COVID-19]), AMPLIFIED PROBE TECHNIQUE, MAKING USE OF HIGH THROUGHPUT TECHNOLOGIES AS DESCRIBED BY CMS-2020-01-R: HCPCS

## 2021-08-18 PROCEDURE — 99213 OFFICE O/P EST LOW 20 MIN: CPT | Mod: TEL

## 2021-08-18 PROCEDURE — U0005 INFEC AGEN DETEC AMPLI PROBE: HCPCS

## 2021-08-18 NOTE — PROGRESS NOTES
Tabitha Payton is being evaluated via a billable telephone visit.      Assessment & Plan:     Symptoms consistent with possible COVID-19; advised pt be tested for this and placed an order. Isolate while awaiting test results. Supportive cares discussed.    See patient instructions below.  At the end of the encounter, I discussed diagnosis & medications. Discussed red flags for being seen in person at clinic/ER, as well as indications for follow up if no improvement. Patient understood and agreed to plan.     No diagnosis found.      No follow-ups on file.    Phone Call Duration : 5  minutes  Additional time spent documenting and reviewing chart:    SESAR Curiel, NANCY ROSADO UNSCHEDULED CARE  -----------------------------------------------------------------------------------------------------------------------------------------------------------------    History provided by Cara (from [facility]) as pt has hx of disorder of intellectual development.    Subjective:   Tabitha Payton is a 41 year old female who is contacted via telephone through scheduled urgent care virtual visit to discuss:   Chief Complaint   Patient presents with     Suspected Covid       Decreased appetite, fatigue, diarrhea, cough, & rhinorrhea onset 4 days ago. She also had a fever yesterday, Tmax 101.1 F. No treatments tried. Patient reports no myalgias, sore throat, loss of sense of taste or smell, headache, chest pain, shortness of breath, abdominal pain, vomiting, rash, or any other symptoms. No known sick contacts/COVID exposure but she lives in a group home. She got the J&J COVID vaccine on 8/12/21.     She has a hx of psoriatic arthritis and takes methotrexate.    Past Medical History:   Diagnosis Date     Psoriatic arthritis (H) 6/23/2021     Psoriatic arthritis (H) 6/23/2021     Objective:   Gen:  NAD  Pulm: non-labored work of breathing    No results found for any visits on 08/18/21.    There are no Patient  Instructions on file for this visit.

## 2021-08-18 NOTE — PATIENT INSTRUCTIONS
Adult Self-Care for Colds  Colds are caused by viruses. They can t be cured with antibiotics. However, you can relieve symptoms and support your body s efforts to heal itself. No matter which symptoms you have, be sure to drink plenty of fluids (water or clear soup); stop smoking and drinking alcohol; and get plenty of rest.      Understand a fever    Take your temperature several times a day. If your fever is 100.4 F (38.0 C) for more than a day, call your doctor.    Relax, lie down. Go to bed if you want. Just get off your feet and rest. Also, drink plenty of fluids to avoid dehydration.    Take acetaminophen or a nonsteroidal anti-inflammatory agent (NSAID), such as ibuprofen.  Treat a troubled nose kindly    Breathe steam or heated humidified air to open blocked nasal passages.  a hot shower or use a vaporizer. Be careful not to get burned by the steam.    Saline nasal sprays and Mucinex help open a stuffy nose. Antihistamines can also help, but they can cause side effects such as drowsiness and drying of the eyes, nose, and mouth.  Soothe a sore throat and cough    Gargle every 2 hours with 1/4 teaspoon of salt dissolved in 1/2 cup of warm water. Suck on throat lozenges and cough drops to moisten your throat (those containing menthol work best).    Cough medicines are available but it is unclear how effective they actually are.    Try honey for cough    Take acetaminophen or an NSAID, such as ibuprofen to ease throat pain  Ease digestive problems    Put fluid back into your body. Take frequent sips of clear liquids such as water or broth. Do not drink beverages with a lot of sugar in them, such as juices and sodas. These can make diarrhea worse. Older children and adults can drink sports drinks.    As your appetite returns, you can resume your normal diet. Ask your doctor whether there are any foods you should avoid.  When to seek medical care  When you first notice symptoms, ask your health care  provider if antiviral medications are appropriate. Antibiotics should not be taken for colds or flu. Also, call your doctor if you have any of the following symptoms or if you aren t feeling better after 7 days:    Shortness of breath    Pain or pressure in the chest or abdomen    Worsening symptoms, especially after a period of improvement    Fever of 100.4 F  (38.0 C) or higher, or fever that doesn t go down with medication    Sudden dizziness or confusion    Severe or continued vomiting    Signs of dehydration, including extreme thirst, dark urine, infrequent urination, dry mouth    Spotted, red, or very sore throat      7488-0986 [employer]. [address] [place], [place] [id number]. All rights reserved. This information is not intended as a substitute for professional medical care. Always follow your healthcare professional's instructions.    Your COVID test results should be available within 3 days, but please allow up to 1 week. If you have MyChart, your COVID test results will be visible there. If you do not have MyChart, you will be called if your test is positive and sent a letter if your test is negative.  Please continue to isolate yourself until you receive your results.    Discharge Instructions for COVID-19 Patients  You have--or may have--COVID-19. Please follow the instructions listed below.   If you have a weakened immune system, discuss with your doctor any other actions you need to take.  How can I protect others?  If you have symptoms (fever, cough, body aches or trouble breathing):    Stay home and away from others (self-isolate) until:  ? Your other symptoms have resolved (gotten better). And   ? You've had no fever--and no medicine that reduces fever--for 1 full day (24 hours). And   ? At least 10 days have passed since your symptoms started. (You may need to wait 20 days. Follow the advice of your care team.)  If you don't show symptoms, but testing showed that you have  "COVID-19:    Stay home and away from others (self-isolate) until at least 10 days have passed since the date of your first positive COVID-19 test.  During this time    Stay in your own room, even for meals. Use your own bathroom if you can.    Stay away from others in your home. No hugging, kissing or shaking hands. No visitors.    Don't go to work, school or anywhere else.    Clean \"high touch\" surfaces often (doorknobs, counters, handles). Use household cleaning spray or wipes.    You'll find a full list of  on the EPA website: www.epa.gov/pesticide-registration/list-n-disinfectants-use-against-sars-cov-2.    Cover your mouth and nose with a mask or other face covering to avoid spreading germs.    Wash your hands and face often. Use soap and water.    Caregivers in these groups are at risk for severe illness due to COVID-19:  ? People 65 years and older  ? People who live in a nursing home or long-term care facility  ? People with chronic disease (lung, heart, cancer, diabetes, kidney, liver, immunologic)  ? People who have a weakened immune system, including those who:    Are in cancer treatment    Take medicine that weakens the immune system, such as corticosteroids    Had a bone marrow or organ transplant    Have an immune deficiency    Have poorly controlled HIV or AIDS    Are obese (body mass index of 40 or higher)    Smoke regularly    Caregivers should wear gloves while washing dishes, handling laundry and cleaning bedrooms and bathrooms.    Use caution when washing and drying laundry: Don't shake dirty laundry and use the warmest water setting that you can.    For more tips on managing your health at home, go to www.cdc.gov/coronavirus/2019-ncov/downloads/10Things.pdf.  How can I take care of myself at home?  1. Get lots of rest. Drink extra fluids (unless a doctor has told you not to).  2. Take Tylenol (acetaminophen) for fever or pain. If you have liver or kidney problems, ask your family doctor " if it's okay to take Tylenol.   Adults can take either:   ? 650 mg (two 325 mg pills) every 4 to 6 hours, or   ? 1,000 mg (two 500 mg pills) every 8 hours as needed.  ? Note: Don't take more than 3,000 mg in one day. Acetaminophen is found in many medicines (both prescribed and over-the-counter medicines). Read all labels to be sure you don't take too much.   For children, check the Tylenol bottle for the right dose. The dose is based on the child's age or weight.  3. If you have other health problems (like cancer, heart failure, an organ transplant or severe kidney disease): Call your specialty clinic if you don't feel better in the next 2 days.  4. Know when to call 911. Emergency warning signs include:  ? Trouble breathing or shortness of breath  ? Pain or pressure in the chest that doesn't go away  ? Feeling confused like you haven't felt before, or not being able to wake up  ? Bluish-colored lips or face  5. Your doctor may have prescribed a blood thinner medicine. Follow their instructions.  Where can I get more information?    [facility] - About COVID-19:   [email or web address]    CDC - What to Do If You're Sick: www.cdc.gov/coronavirus/2019-ncov/about/steps-when-sick.html    CDC - Ending Home Isolation: www.cdc.gov/coronavirus/2019-ncov/hcp/disposition-in-home-patients.html    CDC - Caring for Someone: www.cdc.gov/coronavirus/2019-ncov/if-you-are-sick/care-for-someone.html    [facility] - Interim Guidance for Hospital Discharge to Home: www.health.[facility].[place].us/diseases/coronavirus/hcp/hospdischarge.pdf    Below are the COVID-19 hotlines at the [place] Department of Health ([facility]). Interpreters are available.  ? For health questions: Call 434-304-2031 or 1-824.960.6804 (7 a.m. to 7 p.m.)  ? For questions about schools and childcare: Call 129-107-5797 or 1-539.694.7529 (7 a.m. to 7 p.m.)    For informational purposes only. Not to replace the advice of your health care provider. Clinically  reviewed by Dr. Rashad Camargo.   Copyright   2020 Health system. All rights reserved. [employer] [id number] - REV 01/05/21.

## 2021-08-19 ENCOUNTER — MYC MEDICAL ADVICE (OUTPATIENT)
Dept: FAMILY MEDICINE | Facility: CLINIC | Age: 41
End: 2021-08-19

## 2021-08-19 LAB — SARS-COV-2 RNA RESP QL NAA+PROBE: POSITIVE

## 2021-08-22 ENCOUNTER — NURSE TRIAGE (OUTPATIENT)
Dept: NURSING | Facility: CLINIC | Age: 41
End: 2021-08-22

## 2021-08-22 NOTE — TELEPHONE ENCOUNTER
She lives in a group home and the caregiver is calling in with questions from the patient about how she is feeling , and what to do about the symptoms she is having and if they are doing the right things.  Home care for now, continue to monitor symptoms and call back with any further concerns.    Patient verbalized understanding and agrees with plan.    Janene Sorto Nurse Triage Advisor 9:12 AM 8/22/2021    Reason for Disposition    [1] COVID-19 diagnosed by positive lab test AND [2] mild symptoms (e.g., cough, fever, others) AND [3] no complications or SOB    Additional Information    Negative: SEVERE difficulty breathing (e.g., struggling for each breath, speaks in single words)    Negative: Difficult to awaken or acting confused (e.g., disoriented, slurred speech)    Negative: Bluish (or gray) lips or face now    Negative: Shock suspected (e.g., cold/pale/clammy skin, too weak to stand, low BP, rapid pulse)    Negative: Sounds like a life-threatening emergency to the triager    Protocols used: CORONAVIRUS (COVID-19) DIAGNOSED OR [id number]-[id number] 3.25

## 2021-08-24 ENCOUNTER — HOSPITAL ENCOUNTER (EMERGENCY)
Facility: CLINIC | Age: 41
Discharge: HOME | End: 2021-08-24
Attending: FAMILY MEDICINE | Admitting: FAMILY MEDICINE
Payer: COMMERCIAL

## 2021-08-24 VITALS
DIASTOLIC BLOOD PRESSURE: 72 MMHG | SYSTOLIC BLOOD PRESSURE: 120 MMHG | RESPIRATION RATE: 12 BRPM | WEIGHT: 180 LBS | TEMPERATURE: 96.9 F | OXYGEN SATURATION: 96 % | HEART RATE: 107 BPM | HEIGHT: 69 IN | BODY MASS INDEX: 26.66 KG/M2

## 2021-08-24 DIAGNOSIS — U07.1 INFECTION DUE TO 2019 NOVEL CORONAVIRUS: ICD-10-CM

## 2021-08-24 LAB
ANION GAP SERPL CALCULATED.3IONS-SCNC: 8 MMOL/L (ref 3–14)
BASOPHILS # BLD AUTO: 0 10E3/UL (ref 0–0.2)
BASOPHILS NFR BLD AUTO: 0 %
BUN SERPL-MCNC: 14 MG/DL (ref 7–30)
CALCIUM SERPL-MCNC: 8.1 MG/DL (ref 8.5–10.1)
CHLORIDE BLD-SCNC: 110 MMOL/L (ref 94–109)
CO2 SERPL-SCNC: 25 MMOL/L (ref 20–32)
CREAT SERPL-MCNC: 0.68 MG/DL (ref 0.52–1.04)
EOSINOPHIL # BLD AUTO: 0 10E3/UL (ref 0–0.7)
EOSINOPHIL NFR BLD AUTO: 1 %
ERYTHROCYTE [DISTWIDTH] IN BLOOD BY AUTOMATED COUNT: 13.9 % (ref 10–15)
GFR SERPL CREATININE-BSD FRML MDRD: >90 ML/MIN/1.73M2
GLUCOSE BLD-MCNC: 97 MG/DL (ref 70–99)
HCT VFR BLD AUTO: 41.3 % (ref 35–47)
HGB BLD-MCNC: 12.9 G/DL (ref 11.7–15.7)
IMM GRANULOCYTES # BLD: 0 10E3/UL
IMM GRANULOCYTES NFR BLD: 1 %
LYMPHOCYTES # BLD AUTO: 1 10E3/UL (ref 0.8–5.3)
LYMPHOCYTES NFR BLD AUTO: 20 %
MCH RBC QN AUTO: 26.5 PG (ref 26.5–33)
MCHC RBC AUTO-ENTMCNC: 31.2 G/DL (ref 31.5–36.5)
MCV RBC AUTO: 85 FL (ref 78–100)
MONOCYTES # BLD AUTO: 0.5 10E3/UL (ref 0–1.3)
MONOCYTES NFR BLD AUTO: 11 %
NEUTROPHILS # BLD AUTO: 3.4 10E3/UL (ref 1.6–8.3)
NEUTROPHILS NFR BLD AUTO: 67 %
NRBC # BLD AUTO: 0 10E3/UL
NRBC BLD AUTO-RTO: 0 /100
PLATELET # BLD AUTO: 189 10E3/UL (ref 150–450)
POTASSIUM BLD-SCNC: 3.7 MMOL/L (ref 3.4–5.3)
RBC # BLD AUTO: 4.86 10E6/UL (ref 3.8–5.2)
SODIUM SERPL-SCNC: 143 MMOL/L (ref 133–144)
WBC # BLD AUTO: 5 10E3/UL (ref 4–11)

## 2021-08-24 PROCEDURE — 96360 HYDRATION IV INFUSION INIT: CPT | Performed by: FAMILY MEDICINE

## 2021-08-24 PROCEDURE — 85025 COMPLETE CBC W/AUTO DIFF WBC: CPT | Performed by: FAMILY MEDICINE

## 2021-08-24 PROCEDURE — 80048 BASIC METABOLIC PNL TOTAL CA: CPT | Performed by: FAMILY MEDICINE

## 2021-08-24 PROCEDURE — 258N000003 HC RX IP 258 OP 636: Performed by: FAMILY MEDICINE

## 2021-08-24 PROCEDURE — 96361 HYDRATE IV INFUSION ADD-ON: CPT | Performed by: FAMILY MEDICINE

## 2021-08-24 PROCEDURE — 36415 COLL VENOUS BLD VENIPUNCTURE: CPT | Performed by: FAMILY MEDICINE

## 2021-08-24 PROCEDURE — 99284 EMERGENCY DEPT VISIT MOD MDM: CPT | Performed by: FAMILY MEDICINE

## 2021-08-24 PROCEDURE — 99283 EMERGENCY DEPT VISIT LOW MDM: CPT | Mod: 25 | Performed by: FAMILY MEDICINE

## 2021-08-24 RX ORDER — ONDANSETRON 2 MG/ML
4 INJECTION INTRAMUSCULAR; INTRAVENOUS
Status: DISCONTINUED | OUTPATIENT
Start: 2021-08-24 | End: 2021-08-24 | Stop reason: HOSPADM

## 2021-08-24 RX ORDER — ONDANSETRON 4 MG/1
4-8 TABLET, ORALLY DISINTEGRATING ORAL EVERY 8 HOURS PRN
Qty: 12 TABLET | Refills: 0 | Status: SHIPPED | OUTPATIENT
Start: 2021-08-24 | End: 2021-10-11

## 2021-08-24 RX ADMIN — SODIUM CHLORIDE 1000 ML: 9 INJECTION, SOLUTION INTRAVENOUS at 14:08

## 2021-08-24 ASSESSMENT — MIFFLIN-ST. JEOR: SCORE: 1540.85

## 2021-08-24 NOTE — ED NOTES
States about 2 weeks ago a caregiver came in with covid, pt received her J&J vaccine a day later but then developed symptoms the next day.   Positive for covid and having diarrhea, feels weak  Declined nausea meds

## 2021-08-24 NOTE — ED PROVIDER NOTES
"  HPI   The patient is a 41-year-old female presenting with concern for dehydration.  She was diagnosed with COVID-19 last Friday, 4 days ago.  She had been symptomatic about a week prior to that.  She no longer has fever symptoms.  Her cough is incessant.  She denies shortness of breath.  When she coughs repeatedly she will have episodes of diarrhea.  Her frequency of diarrhea has improved though she continues to go on a regular basis, 3 times this morning already.  No hematochezia or melena.  She is nauseous but no vomiting.  She has trouble taking anything by mouth, \"because it makes me have diarrhea.\"  No lightheadedness or fainting.  No respiratory distress or trouble with breathing.  No abdominal pain.  No skin rash.        Allergies:  No Known Allergies  Problem List:    Patient Active Problem List    Diagnosis Date Noted     Bipolar 1 disorder (H) 07/21/2021     Priority: Medium     Trauma and stressor-related disorder 07/21/2021     Priority: Medium     Disorder of intellectual development 07/21/2021     Priority: Medium     Overweight (BMI 25.0-29.9) 07/21/2021     Priority: Medium     Psoriasis 06/23/2021     Priority: Medium     Psoriatic arthritis (H) 06/23/2021     Priority: Medium      Past Medical History:    Past Medical History:   Diagnosis Date     Psoriatic arthritis (H) 6/23/2021     Psoriatic arthritis (H) 6/23/2021     Past Surgical History:    Past Surgical History:   Procedure Laterality Date     deviated septum  2000     Family History:    Family History   Problem Relation Age of Onset     Unknown/Adopted Mother      Social History:  Marital Status:  Single [1]  Social History     Tobacco Use     Smoking status: Never Smoker     Smokeless tobacco: Never Used   Vaping Use     Vaping Use: Never used   Substance Use Topics     Alcohol use: Never     Drug use: Never      Medications:    ondansetron (ZOFRAN ODT) 4 MG ODT tab  apremilast (OTEZLA) 30 mg Tab  cetirizine (ZYRTEC) 10 MG " "tablet  drospirenone-ethinyl estradioL (LORYNA, 28,) 3-0.02 mg per tablet  FLUoxetine (PROZAC) 40 MG capsule  folic acid (FOLVITE) 1 MG tablet  hydrocortisone (CORTAID) 1 % external cream  INVEGA SUSTENNA 156 MG/ML COLETTE injection  LORazepam (ATIVAN) 1 MG tablet  methotrexate 2.5 MG tablet  methotrexate sodium 2.5 MG TABS  mometasone (ELOCON) 0.1 % external solution  multivit-min-ferrous gluconate 9 mg iron/15 mL Liqd  polyethylene glycol (MIRALAX) 17 GM/Dose powder  propranolol ER (INDERAL LA) 60 MG 24 hr capsule  simvastatin (ZOCOR) 20 MG tablet  triamcinolone (KENALOG) 0.1 % external ointment      Review of Systems   All other systems reviewed and are negative.      PE   BP: 118/73  Pulse: 107  Temp: 96.9  F (36.1  C)  Resp: 12  Height: 175.3 cm (5' 9\")  Weight: 81.6 kg (180 lb)  SpO2: 97 %  Physical Exam  Vitals reviewed.   Constitutional:       General: She is not in acute distress.     Appearance: She is well-developed.      Comments: Cooperative, answering questions well.   HENT:      Head: Normocephalic and atraumatic.      Right Ear: External ear normal.      Left Ear: External ear normal.      Nose: Nose normal.      Mouth/Throat:      Mouth: Mucous membranes are dry.      Pharynx: Oropharynx is clear.   Eyes:      Extraocular Movements: Extraocular movements intact.      Conjunctiva/sclera: Conjunctivae normal.      Pupils: Pupils are equal, round, and reactive to light.   Cardiovascular:      Rate and Rhythm: Regular rhythm. Tachycardia present.   Pulmonary:      Effort: Pulmonary effort is normal.   Musculoskeletal:         General: Normal range of motion.      Cervical back: Normal range of motion.   Skin:     General: Skin is warm and dry.   Neurological:      Mental Status: She is alert and oriented to person, place, and time.   Psychiatric:         Behavior: Behavior normal.         ED COURSE and MDM   1340.  The patient has persistent symptoms of COVID-19.  IV fluid bolus.  Zofran.  Lab values " "pending.    1540.  The patient is demanding to leave.  Unfortunately, she says we \"have not done anything for me over the past 2 hours and I am getting out here.\"    LABS  Labs Ordered and Resulted from Time of ED Arrival Up to the Time of Departure from the ED   BASIC METABOLIC PANEL - Abnormal; Notable for the following components:       Result Value    Chloride 110 (*)     Calcium 8.1 (*)     All other components within normal limits   CBC WITH PLATELETS AND DIFFERENTIAL - Abnormal; Notable for the following components:    MCHC 31.2 (*)     All other components within normal limits   CBC WITH PLATELETS & DIFFERENTIAL    Narrative:     The following orders were created for panel order CBC with platelets, differential.  Procedure                               Abnormality         Status                     ---------                               -----------         ------                     CBC with platelets and d...[[id number]]  Abnormal            Final result                 Please view results for these tests on the individual orders.       IMAGING  Images reviewed by me.  Radiology report also reviewed.  No orders to display       Procedures    Medications   ondansetron (ZOFRAN) injection 4 mg (4 mg Intravenous Not Given 8/24/21 1412)   0.9% sodium chloride BOLUS (0 mLs Intravenous Stopped 8/24/21 1539)         IMPRESSION       ICD-10-CM    1. Infection due to 2019 novel coronavirus  U07.1             Medication List      Started    ondansetron 4 MG ODT tab  Commonly known as: Zofran ODT  4-8 mg, Oral, EVERY 8 HOURS PRN                          Satnam Mendoza MD  08/24/21 1541    " Yes

## 2021-08-24 NOTE — DISCHARGE INSTRUCTIONS
Return to the Emergency Room if the following occurs:     Severely worsened breathing, dehydration, or for any concern at anytime.    Medications discussed:    Zofran as needed for nausea.    If you received pain-relieving or sedating medication during your time in the ER, avoid alcohol, driving automobiles, or working with machinery.  Also, a responsible adult must stay with you.        Call the Nurse Advice Line at [phone] or [phone] for any concern at anytime.

## 2021-08-25 ENCOUNTER — PATIENT OUTREACH (OUTPATIENT)
Dept: FAMILY MEDICINE | Facility: CLINIC | Age: 41
End: 2021-08-25

## 2021-08-27 NOTE — TELEPHONE ENCOUNTER
"ED / Discharge Outreach Protocol    Patient Contact    Attempt # 1    Was call answered?  Yes.  \"May I please speak with <patient name>\"  Is patient available?   Yes      ED for acute condition Discharge Protocol    \"Hi, my name is Madie Dalton RN, a registered nurse, and I am calling from [facility].  I am calling to follow up and see how things are going for you after your recent emergency visit.\"    Tell me how you are doing now that you are home?\" Positive Covid.  Armida, staff person at [facility], states pt is much better now!  Cough is only symptom.      Discharge Instructions    \"Let's review your discharge instructions.  What is/are the follow-up recommendations?  Pt. Response: Reviewed in detail.    \"Has an appointment with your primary care provider been scheduled?\"  No (not needed)  Pt is doing well.  Much improved.  Medications    \"Tell me what changed about your medicines when you discharged?\"    none    \"What questions do you have about your medications?\"   None        Call Summary    \"What questions or concerns do you have about your recent visit and your follow-up care?\"     none    \"If you have questions or things don't continue to improve, we encourage you contact us through the main clinic number (give number).  Even if the clinic is not open, triage nurses are available 24/7 to help you.     We would like you to know that our clinic has extended hours (provide information).  We also have urgent care (provide details on closest location and hours/contact info)\"    \"Thank you for your time and take care!\"                  "

## 2021-08-30 DIAGNOSIS — L40.50 PSA (PSORIATIC ARTHRITIS) (H): ICD-10-CM

## 2021-08-31 RX ORDER — APREMILAST 30 MG/1
TABLET, FILM COATED ORAL
Qty: 60 TABLET | Refills: 1 | Status: SHIPPED | OUTPATIENT
Start: 2021-08-31 | End: 2021-10-18

## 2021-09-10 ENCOUNTER — MYC MEDICAL ADVICE (OUTPATIENT)
Dept: FAMILY MEDICINE | Facility: CLINIC | Age: 41
End: 2021-09-10

## 2021-09-10 DIAGNOSIS — L40.9 PSORIASIS: ICD-10-CM

## 2021-09-10 NOTE — LETTER
Tabitha Payton  [address] [address] [facility] [address]            To Whom This May Concern:            Change order:     triamcinolone (KENALOG) 0.1 % external ointment to PRN

## 2021-09-13 NOTE — TELEPHONE ENCOUNTER
Dr. Loaiza,    Please see my chart message about changing the triamcinolone 0.1% oint to PRN.  I have started a letter for the hard copy order for group home records. Jen ROBLES RN

## 2021-09-14 RX ORDER — TRIAMCINOLONE ACETONIDE 1 MG/G
OINTMENT TOPICAL 3 TIMES DAILY PRN
Qty: 80 G | Refills: 4 | Status: SHIPPED | OUTPATIENT
Start: 2021-09-14 | End: 2023-10-17

## 2021-09-14 NOTE — TELEPHONE ENCOUNTER
Dr Loaiza changed the order.    Cara called in the meantime to check on this.  She is designated coordinator at [facility].    She asks that order be faxed to [facility] for their records.    Faxed to [phone].    Madie Dalton RN

## 2021-09-18 ENCOUNTER — HEALTH MAINTENANCE LETTER (OUTPATIENT)
Age: 41
End: 2021-09-18

## 2021-09-27 ENCOUNTER — TELEPHONE (OUTPATIENT)
Dept: RHEUMATOLOGY | Facility: CLINIC | Age: 41
End: 2021-09-27

## 2021-09-28 NOTE — TELEPHONE ENCOUNTER
I called the pt, she was wondering when she needed labs, I informed before 10/18, which is her f/u appt.

## 2021-10-07 ENCOUNTER — TELEPHONE (OUTPATIENT)
Dept: FAMILY MEDICINE | Facility: CLINIC | Age: 41
End: 2021-10-07

## 2021-10-07 NOTE — TELEPHONE ENCOUNTER
Reason for Call: Request for an order or referral:    Order or referral being requested: Denita Farley from care team is calling asking for a order stating that the Ondansetron (zofran odt) need to be discontinue that she has not used it.  Call back number [phone]  Fax- [phone] you have to call them before you send it because they have to switch the line over to fax.     Date needed: at your convenience    Has the patient been seen by the PCP for this problem? YES    Phone number Patient can be reached at:  Home number on file [phone] (home)    Best Time:  any    Can we leave a detailed message on this number?  YES    Call taken on 10/7/2021 at 2:09 PM by Mima Palacios

## 2021-10-07 NOTE — LETTER
Tabitha Payton  [address] [address] [facility] [address]              To Whom This Concerns:      Please discontinue the zofran (ondansetron) ODT for this patient.        Alla Falcon, CNP

## 2021-10-11 NOTE — TELEPHONE ENCOUNTER
Covering for primary/ordering provider:  Letter done, RN can sign on my behalf or ask another provider to sign as I am at [facility] today and fax as requested  Alla Falcon, CNP

## 2021-10-11 NOTE — TELEPHONE ENCOUNTER
Dr. Loaiza,    Patient not using the zofran any longer.  Care giver looking for discontinue order. Letter started for the written order. Jen ROBLES RN

## 2021-10-12 ENCOUNTER — LAB (OUTPATIENT)
Dept: LAB | Facility: CLINIC | Age: 41
End: 2021-10-12
Payer: COMMERCIAL

## 2021-10-12 DIAGNOSIS — M25.50 POLYARTHRALGIA: ICD-10-CM

## 2021-10-12 DIAGNOSIS — Z79.899 HIGH RISK MEDICATION USE: ICD-10-CM

## 2021-10-12 LAB
ALBUMIN SERPL-MCNC: 3 G/DL (ref 3.4–5)
ALT SERPL W P-5'-P-CCNC: 27 U/L (ref 0–50)
CREAT SERPL-MCNC: 0.75 MG/DL (ref 0.52–1.04)
ERYTHROCYTE [DISTWIDTH] IN BLOOD BY AUTOMATED COUNT: 15.5 % (ref 10–15)
GFR SERPL CREATININE-BSD FRML MDRD: >90 ML/MIN/1.73M2
HCT VFR BLD AUTO: 37.5 % (ref 35–47)
HGB BLD-MCNC: 12 G/DL (ref 11.7–15.7)
MCH RBC QN AUTO: 27.3 PG (ref 26.5–33)
MCHC RBC AUTO-ENTMCNC: 32 G/DL (ref 31.5–36.5)
MCV RBC AUTO: 85 FL (ref 78–100)
PLATELET # BLD AUTO: 301 10E3/UL (ref 150–450)
RBC # BLD AUTO: 4.4 10E6/UL (ref 3.8–5.2)
WBC # BLD AUTO: 9.3 10E3/UL (ref 4–11)

## 2021-10-12 PROCEDURE — 82565 ASSAY OF CREATININE: CPT

## 2021-10-12 PROCEDURE — 82040 ASSAY OF SERUM ALBUMIN: CPT

## 2021-10-12 PROCEDURE — 36415 COLL VENOUS BLD VENIPUNCTURE: CPT

## 2021-10-12 PROCEDURE — 84460 ALANINE AMINO (ALT) (SGPT): CPT

## 2021-10-12 PROCEDURE — 85027 COMPLETE CBC AUTOMATED: CPT

## 2021-10-18 ENCOUNTER — VIRTUAL VISIT (OUTPATIENT)
Dept: RHEUMATOLOGY | Facility: CLINIC | Age: 41
End: 2021-10-18
Payer: COMMERCIAL

## 2021-10-18 DIAGNOSIS — R41.89 COGNITIVE IMPAIRMENT: ICD-10-CM

## 2021-10-18 DIAGNOSIS — L40.9 PSORIASIS: ICD-10-CM

## 2021-10-18 DIAGNOSIS — Z79.899 HIGH RISK MEDICATION USE: ICD-10-CM

## 2021-10-18 DIAGNOSIS — L40.50 PSORIATIC ARTHRITIS (H): Primary | ICD-10-CM

## 2021-10-18 PROCEDURE — 99214 OFFICE O/P EST MOD 30 MIN: CPT | Mod: TEL | Performed by: INTERNAL MEDICINE

## 2021-10-18 RX ORDER — TOPIRAMATE 25 MG/1
25 TABLET, FILM COATED ORAL DAILY
COMMUNITY
End: 2023-01-18

## 2021-10-18 RX ORDER — METHOTREXATE 2.5 MG/1
10 TABLET ORAL WEEKLY
Qty: 120 TABLET | Refills: 0 | Status: SHIPPED | OUTPATIENT
Start: 2021-10-18 | End: 2022-01-10

## 2021-10-18 RX ORDER — MULTIVIT-MIN/FA/LYCOPEN/LUTEIN .4-300-25
TABLET ORAL
COMMUNITY
End: 2022-02-14

## 2021-10-18 RX ORDER — APREMILAST 30 MG/1
TABLET, FILM COATED ORAL
Qty: 60 TABLET | Refills: 2 | Status: SHIPPED | OUTPATIENT
Start: 2021-10-18 | End: 2022-01-06

## 2021-10-18 NOTE — PROGRESS NOTES
Adina is a 41 year old who is being evaluated via a billable telephone visit.      What phone number would you like to be contacted at? [phone]  How would you like to obtain your AVS? Manjit  Phone call duration: 6 minutes    This document was created using a software with less than 100% fidelity, at times resulting in unintended, even erroneous syntax and grammar.  The reader is advised to keep this under consideration while reviewing, interpreting this note.           ASSESSMENT AND PLAN:    Diagnoses and all orders for this visit:  Psoriatic arthritis (H)  -     apremilast (OTEZLA) 30 MG tablet; TAKE 1 TABLET BY MOUTH 2 TIMES A DAY.  -     methotrexate sodium 2.5 MG TABS; Take 10 tablets (25 mg) by mouth once a week  Psoriasis  -     methotrexate sodium 2.5 MG TABS; Take 10 tablets (25 mg) by mouth once a week  High risk medication use  Cognitive impairment      Follow up in 3 months, in person, she would be interested in dropping one of her medications at a time      HISTORY OF PRESENTING ILLNESS:  Tabitha Payton 41 year old is evaluated here via phone link.  This is for follow-up.  She has psoriatic arthritis, at one point she was thought to have psoriasis and seronegative rheumatoid arthritis towards elsewhere she has moved to this area about 4 months ago.  She is a group home.  She is on 25 mg of methotrexate per week, she takes folic acid, Otezla.  Her recent labs are reviewed with her within acceptable range.  She reports no flareup of her joint symptoms during the past several months.  There is no stiffness in the morning.  She does not experience swelling.  She noted that psoriasis is under control with the current regimen.  She is able to do all things physical that she is expected to do.  She works at a local [employer].  She does not think her depression is gotten worse with Otezla.  There are no GI side effects.  She was assisted in today's conversation by another group home attendants.  This  is on the phone.       ROS enquiry held for fever, ocular symptoms, rash, headache,  GI issues.  Today we also discussed the issues related to the current pandemic, the pros and cons of the current treatment plan, the CDC guidelines such as social distancing washing the hands covering the cough.  ALLERGIES:Patient has no known allergies.    PAST MEDICAL/ACTIVE PROBLEMS/MEDICATION/SOCIAL DATA  Past Medical History:   Diagnosis Date     Psoriatic arthritis (H) 6/23/2021     Psoriatic arthritis (H) 6/23/2021     History   Smoking Status     Never Smoker   Smokeless Tobacco     Never Used     Patient Active Problem List   Diagnosis     Psoriasis     Psoriatic arthritis (H)     Bipolar 1 disorder (H)     Trauma and stressor-related disorder     Disorder of intellectual development     Overweight (BMI 25.0-29.9)     Current Outpatient Medications   Medication Sig Dispense Refill     cetirizine (ZYRTEC) 10 MG tablet Take 1 tablet (10 mg) by mouth daily 30 tablet 11     drospirenone-ethinyl estradioL (LORYNA, 28,) 3-0.02 mg per tablet [DROSPIRENONE-ETHINYL ESTRADIOL (LORYNA, 28,) 3-0.02 MG PER TABLET] Take 1 tablet by mouth daily.       FLUoxetine (PROZAC) 40 MG capsule Take 20 mg by mouth daily 2 tablets daily       folic acid (FOLVITE) 1 MG tablet Take 1 tablet (1 mg) by mouth daily 90 tablet 4     hydrocortisone (CORTAID) 1 % external cream APPLY TO THE AFFECTED AREA(S) FOUR TIMES DAILY AS NEEDED (Patient not taking: Reported on 8/4/2021) 15 g 11     INVEGA SUSTENNA 156 MG/ML COLETTE injection Inject one hundred fifty six (156) milligrams into the muscle as directed every 28 days please let clinic know if patient refuses       LORazepam (ATIVAN) 1 MG tablet Take 1 tablet (1 mg) by mouth 2 times daily       methotrexate 2.5 MG tablet [METHOTREXATE 2.5 MG TABLET] Take 10 tablets (25 mg) by mouth once a week (Patient taking differently: 2.5 mg In the morning) 40 tablet 0     methotrexate sodium 2.5 MG TABS Take 10 tablets (25  mg) by mouth once a week 120 tablet 0     mometasone (ELOCON) 0.1 % external solution Apply topically daily Apply to scalp and massage at bedtime and wash out in morning. 60 mL 11     multivit-min-ferrous gluconate 9 mg iron/15 mL Liqd [MULTIVIT-MIN-FERROUS GLUCONATE 9 MG IRON/15 ML LIQD] Take by mouth.       OTEZLA 30 MG tablet TAKE 1 TABLET BY MOUTH 2 TIMES A DAY. 60 tablet 1     polyethylene glycol (MIRALAX) 17 GM/Dose powder Mix 1/2 capful daily as needed for constipation or constipation prevention in 8 oz of liquid and drink. 1 g 0     propranolol ER (INDERAL LA) 60 MG 24 hr capsule Take 80 mg by mouth daily        simvastatin (ZOCOR) 20 MG tablet Take 1 tablet (20 mg) by mouth At Bedtime 30 tablet 11     triamcinolone (KENALOG) 0.1 % external ointment Apply topically 3 times daily as needed (skin irritation) 80 g 4         EXAMINATION:     On the phone she sounded comfortable, alert, oriented, her speech was fluent.  Her memory recall appeared normal.  She did not sound like she was in pain or short of breath.        LAB / IMAGING DATA:  ALT   Date Value Ref Range Status   10/12/2021 27 0 - 50 U/L Final   07/16/2021 27 0 - 50 U/L Final   04/21/2021 21 0 - 45 U/L Final   03/29/2021 25 0 - 50 U/L Final     Albumin   Date Value Ref Range Status   10/12/2021 3.0 (L) 3.4 - 5.0 g/dL Final   07/16/2021 3.2 (L) 3.4 - 5.0 g/dL Final   04/21/2021 3.4 (L) 3.5 - 5.0 g/dL Final   03/29/2021 3.4 3.4 - 5.0 g/dL Final       WBC Count   Date Value Ref Range Status   10/12/2021 9.3 4.0 - 11.0 10e3/uL Final   08/24/2021 5.0 4.0 - 11.0 10e3/uL Final     Hemoglobin   Date Value Ref Range Status   10/12/2021 12.0 11.7 - 15.7 g/dL Final   08/24/2021 12.9 11.7 - 15.7 g/dL Final   08/04/2021 12.5 11.7 - 15.7 g/dL Final     Platelet Count   Date Value Ref Range Status   10/12/2021 301 150 - 450 10e3/uL Final   08/24/2021 189 150 - 450 10e3/uL Final   08/04/2021 348 150 - 450 10e3/uL Final       No results found for: PONCHO

## 2021-10-19 ENCOUNTER — OFFICE VISIT (OUTPATIENT)
Dept: FAMILY MEDICINE | Facility: CLINIC | Age: 41
End: 2021-10-19
Payer: COMMERCIAL

## 2021-10-19 VITALS
DIASTOLIC BLOOD PRESSURE: 60 MMHG | OXYGEN SATURATION: 99 % | SYSTOLIC BLOOD PRESSURE: 92 MMHG | WEIGHT: 188 LBS | TEMPERATURE: 97.8 F | BODY MASS INDEX: 30.22 KG/M2 | RESPIRATION RATE: 12 BRPM | HEIGHT: 66 IN | HEART RATE: 59 BPM

## 2021-10-19 DIAGNOSIS — Z78.9 HISTORY OF EXCESSIVE CERUMEN: Primary | ICD-10-CM

## 2021-10-19 PROCEDURE — 99212 OFFICE O/P EST SF 10 MIN: CPT | Performed by: NURSE PRACTITIONER

## 2021-10-19 ASSESSMENT — MIFFLIN-ST. JEOR: SCORE: 1529.51

## 2021-10-19 NOTE — PROGRESS NOTES
Chief Complaint   Patient presents with     Rule Out Hearing Loss     says hearing loss in worse in right ear/audio     History of Present Illness   Tabitha Payton is a 41 year old female who presents to me today for ear evaluation.  The patient reports that she was previously told her left ear was her better hearing ear.  She has not noticed any significant impairment from her hearing asymmetry.  She denies any otalgia, otorrhea, bloody otorrhea, dizziness, tinnitus, vertigo.  She is unaware of any previous ear surgery.     Past Medical History  Patient Active Problem List   Diagnosis     Psoriasis     Psoriatic arthritis (H)     Bipolar 1 disorder (H)     Trauma and stressor-related disorder     Disorder of intellectual development     Overweight (BMI 25.0-29.9)     Current Medications     Current Outpatient Medications:      apremilast (OTEZLA) 30 MG tablet, TAKE 1 TABLET BY MOUTH 2 TIMES A DAY., Disp: 60 tablet, Rfl: 2     cetirizine (ZYRTEC) 10 MG tablet, Take 1 tablet (10 mg) by mouth daily, Disp: 30 tablet, Rfl: 11     drospirenone-ethinyl estradioL (LORYNA, 28,) 3-0.02 mg per tablet, [DROSPIRENONE-ETHINYL ESTRADIOL (LORYNA, 28,) 3-0.02 MG PER TABLET] Take 1 tablet by mouth daily., Disp: , Rfl:      folic acid (FOLVITE) 1 MG tablet, Take 1 tablet (1 mg) by mouth daily, Disp: 90 tablet, Rfl: 4     INVEGA SUSTENNA 156 MG/ML COLETTE injection, Inject one hundred fifty six (156) milligrams into the muscle as directed every 28 days please let clinic know if patient refuses, Disp: , Rfl:      methotrexate sodium 2.5 MG TABS, Take 10 tablets (25 mg) by mouth once a week, Disp: 120 tablet, Rfl: 0     mometasone (ELOCON) 0.1 % external solution, Apply topically daily Apply to scalp and massage at bedtime and wash out in morning., Disp: 60 mL, Rfl: 11     Multiple Vitamins-Minerals (CERTAVITE SENIOR) TABS, , Disp: , Rfl:      multivit-min-ferrous gluconate 9 mg iron/15 mL Liqd, [MULTIVIT-MIN-FERROUS GLUCONATE 9 MG  IRON/15 ML LIQD] Take by mouth. (Patient not taking: Reported on 10/18/2021), Disp: , Rfl:      polyethylene glycol (MIRALAX) 17 GM/Dose powder, Mix 1/2 capful daily as needed for constipation or constipation prevention in 8 oz of liquid and drink., Disp: 1 g, Rfl: 0     propranolol ER (INDERAL LA) 60 MG 24 hr capsule, Take 80 mg by mouth daily , Disp: , Rfl:      simvastatin (ZOCOR) 20 MG tablet, Take 1 tablet (20 mg) by mouth At Bedtime, Disp: 30 tablet, Rfl: 11     topiramate (TOPAMAX) 25 MG tablet, Take 25 mg by mouth daily, Disp: , Rfl:      triamcinolone (KENALOG) 0.1 % external ointment, Apply topically 3 times daily as needed (skin irritation), Disp: 80 g, Rfl: 4    Allergies  Allergies   Allergen Reactions     Dust Mites        Social History   Social History     Socioeconomic History     Marital status: Single     Spouse name: Not on file     Number of children: Not on file     Years of education: Not on file     Highest education level: Not on file   Occupational History     Not on file   Tobacco Use     Smoking status: Never Smoker     Smokeless tobacco: Never Used   Vaping Use     Vaping Use: Never used   Substance and Sexual Activity     Alcohol use: Never     Drug use: Never     Sexual activity: Never   Other Topics Concern     Not on file   Social History Narrative     Not on file     Social Determinants of Health     Financial Resource Strain:      Difficulty of Paying Living Expenses:    Food Insecurity:      Worried About Running Out of Food in the Last Year:      Ran Out of Food in the Last Year:    Transportation Needs:      Lack of Transportation (Medical):      Lack of Transportation (Non-Medical):    Physical Activity:      Days of Exercise per Week:      Minutes of Exercise per Session:    Stress:      Feeling of Stress :    Social Connections:      Frequency of Communication with Friends and Family:      Frequency of Social Gatherings with Friends and Family:      Attends Scientology  Services:      Active Member of Clubs or Organizations:      Attends Club or Organization Meetings:      Marital Status:    Intimate Partner Violence:      Fear of Current or Ex-Partner:      Emotionally Abused:      Physically Abused:      Sexually Abused:        Family History  Family History   Problem Relation Age of Onset     Unknown/Adopted Mother        Review of Systems  As per HPI and PMHx, otherwise 10+ comprehensive system review is negative.    Physical Exam  BP 99/60 (BP Location: Left arm, Patient Position: Chair, Cuff Size: Adult Regular)   Pulse 58   Temp 98.2  F (36.8  C) (Tympanic)   Wt 85.3 kg (188 lb)   BMI 30.34 kg/m    GENERAL: Patient is a pleasant, cooperative 41 year old female in no acute distress.  HEAD: Normocephalic, atraumatic.  Hair and scalp are normal.  EYES: Pupils are equal, round, reactive to light and accommodation.  Extraocular movements are intact.  The sclera nonicteric without injection.  The extraocular structures are normal.  EARS: Normal shape and symmetry.  No tenderness when palpating the mastoid or tragal areas bilaterally.  Otoscopic exam reveals a minimal  NEUROLOGIC: Cranial nerves II through XII are grossly intact.  Voice is strong.  Facial nerve examination incomplete due to the patient wearing a mask.  CARDIOVASCULAR: Extremities are warm and well-perfused.  No significant peripheral edema.  RESPIRATORY: Patient has nonlabored breathing without cough, wheeze, stridor.  PSYCHIATRIC: Patient is alert and oriented.  Mood and affect appear normal.  SKIN: Warm and dry.  No scalp, face, or neck lesions noted.    Audiogram  The patient underwent an audiogram performed today.  My review of the audiogram shows mild conductive hearing loss in the right ear and normal hearing in the left ear.  Pure-tone average is 19 dB on the right and 8 dB on the left.  Speech reception threshold is 20 dB on the right and 5 dB on the left.  The patient had 92% word recognition on the  right and 96% word recognition on the left.  The patient had a type A deep tympanogram on the right and a type A deep tympanogram on the left.      Assessment and Plan     ICD-10-CM    1. Conductive hearing loss of right ear with unrestricted hearing of left ear  H90.11 Adult Audiology Referral   2. Normal ear exam  Z01.10      It was my pleasure seeing Tabitha Payton today in clinic.  The patient presents today with a slight conductive hearing loss in the right ear versus her left ear.  Her ear exam is within normal limits.  I can find no evidence of serious middle ear pathology or other complicating factors that could be causing this.  We spent the remainder of today's visit on education. We discussed hearing protection in noisy environments.    The patient will follow up as necessary for worsening symptoms or changes in symptoms. I have also recommended repeat audiogram in 1-3 years, or sooner if symptoms warrant.      Bossman Rosales MD  Department of Otolaryngology-Head and Neck Surgery  [facility]

## 2021-10-19 NOTE — PROGRESS NOTES
"    Assessment & Plan     History of excessive cerumen  No cerumen impaction on exam               FUTURE APPOINTMENTS:       - Follow-up for annual visit or as needed    No follow-ups on file.    Time spent in chart review in preparation to see patient, time with patient for interview/exam, ordering medications/tests/and/or procedures, and time spent in charting and coordinating care: 10 minutes.       WICHO Velasquez CNP  [facility]    Jonna Holden is a 41 year old who presents for the following health issues     HPI     Chief Complaint   Patient presents with     Ear Problem     paitent here to have ear flushed prior to hearing test next week       Denies ear pain, drainage, loss of hearing.     Review of Systems   Constitutional, HEENT, cardiovascular, pulmonary, gi and gu systems are negative, except as otherwise noted.      Objective    BP 92/60   Pulse 59   Temp 97.8  F (36.6  C) (Tympanic)   Resp 12   Ht 1.676 m (5' 6\")   Wt 85.3 kg (188 lb)   SpO2 99%   BMI 30.34 kg/m    Body mass index is 30.34 kg/m .  Physical Exam   GENERAL: alert and no distress  HENT: normal cephalic/atraumatic and ear canals and TM's normal  NECK: no asymmetry, masses, or scars  CV: regular rates and rhythm                "

## 2021-10-20 ENCOUNTER — TELEPHONE (OUTPATIENT)
Dept: RHEUMATOLOGY | Facility: CLINIC | Age: 41
End: 2021-10-20

## 2021-10-20 ENCOUNTER — TRANSFERRED RECORDS (OUTPATIENT)
Dept: HEALTH INFORMATION MANAGEMENT | Facility: CLINIC | Age: 41
End: 2021-10-20
Payer: COMMERCIAL

## 2021-10-20 NOTE — TELEPHONE ENCOUNTER
Nellie from the group home the pt lives at is requesting that the VHP form they sent over on Monday to be filled out and sent back over. Fax form to [phone] but call first so they can switch the line over as this is a phone line too. Thanks!

## 2021-10-20 NOTE — TELEPHONE ENCOUNTER
Have tried to call Group home twice to send over fax both times line has been busy or a fax tone.

## 2021-10-25 ENCOUNTER — OFFICE VISIT (OUTPATIENT)
Dept: AUDIOLOGY | Facility: CLINIC | Age: 41
End: 2021-10-25
Payer: COMMERCIAL

## 2021-10-25 ENCOUNTER — OFFICE VISIT (OUTPATIENT)
Dept: OTOLARYNGOLOGY | Facility: CLINIC | Age: 41
End: 2021-10-25
Payer: COMMERCIAL

## 2021-10-25 VITALS
SYSTOLIC BLOOD PRESSURE: 99 MMHG | BODY MASS INDEX: 30.34 KG/M2 | HEART RATE: 58 BPM | DIASTOLIC BLOOD PRESSURE: 60 MMHG | WEIGHT: 188 LBS | TEMPERATURE: 98.2 F

## 2021-10-25 DIAGNOSIS — H90.11 CONDUCTIVE HEARING LOSS OF RIGHT EAR WITH UNRESTRICTED HEARING OF LEFT EAR: Primary | ICD-10-CM

## 2021-10-25 DIAGNOSIS — Z01.10 NORMAL EAR EXAM: ICD-10-CM

## 2021-10-25 PROCEDURE — 92557 COMPREHENSIVE HEARING TEST: CPT | Performed by: AUDIOLOGIST

## 2021-10-25 PROCEDURE — 92550 TYMPANOMETRY & REFLEX THRESH: CPT | Performed by: AUDIOLOGIST

## 2021-10-25 PROCEDURE — 99203 OFFICE O/P NEW LOW 30 MIN: CPT | Performed by: OTOLARYNGOLOGY

## 2021-10-25 PROCEDURE — 99207 PR NO CHARGE LOS: CPT | Performed by: AUDIOLOGIST

## 2021-10-25 ASSESSMENT — PAIN SCALES - GENERAL: PAINLEVEL: NO PAIN (0)

## 2021-10-25 NOTE — NURSING NOTE
"Initial BP 99/60 (BP Location: Left arm, Patient Position: Chair, Cuff Size: Adult Regular)   Pulse 58   Temp 98.2  F (36.8  C) (Tympanic)   Wt 85.3 kg (188 lb)   BMI 30.34 kg/m   Estimated body mass index is 30.34 kg/m  as calculated from the following:    Height as of 10/19/21: 1.676 m (5' 6\").    Weight as of this encounter: 85.3 kg (188 lb). .    Cara Campa LPN    "

## 2021-10-25 NOTE — LETTER
10/25/2021         RE: Tabitha Payton  [address] [address] [facility] [address]        Dear Colleague,    Thank you for referring your patient, Tabitha Payton, to the [facility]. Please see a copy of my visit note below.    Chief Complaint   Patient presents with     Rule Out Hearing Loss     says hearing loss in worse in right ear/audio     History of Present Illness   Tabitha Payton is a 41 year old female who presents to me today for ear evaluation.  The patient reports that she was previously told her left ear was her better hearing ear.  She has not noticed any significant impairment from her hearing asymmetry.  She denies any otalgia, otorrhea, bloody otorrhea, dizziness, tinnitus, vertigo.  She is unaware of any previous ear surgery.     Past Medical History  Patient Active Problem List   Diagnosis     Psoriasis     Psoriatic arthritis (H)     Bipolar 1 disorder (H)     Trauma and stressor-related disorder     Disorder of intellectual development     Overweight (BMI 25.0-29.9)     Current Medications     Current Outpatient Medications:      apremilast (OTEZLA) 30 MG tablet, TAKE 1 TABLET BY MOUTH 2 TIMES A DAY., Disp: 60 tablet, Rfl: 2     cetirizine (ZYRTEC) 10 MG tablet, Take 1 tablet (10 mg) by mouth daily, Disp: 30 tablet, Rfl: 11     drospirenone-ethinyl estradioL (LORYNA, 28,) 3-0.02 mg per tablet, [DROSPIRENONE-ETHINYL ESTRADIOL (LORYNA, 28,) 3-0.02 MG PER TABLET] Take 1 tablet by mouth daily., Disp: , Rfl:      folic acid (FOLVITE) 1 MG tablet, Take 1 tablet (1 mg) by mouth daily, Disp: 90 tablet, Rfl: 4     INVEGA SUSTENNA 156 MG/ML COLETTE injection, Inject one hundred fifty six (156) milligrams into the muscle as directed every 28 days please let clinic know if patient refuses, Disp: , Rfl:      methotrexate sodium 2.5 MG TABS, Take 10 tablets (25 mg) by mouth once a week, Disp: 120 tablet, Rfl: 0     mometasone (ELOCON) 0.1 % external solution, Apply topically  daily Apply to scalp and massage at bedtime and wash out in morning., Disp: 60 mL, Rfl: 11     Multiple Vitamins-Minerals (CERTAVITE SENIOR) TABS, , Disp: , Rfl:      multivit-min-ferrous gluconate 9 mg iron/15 mL Liqd, [MULTIVIT-MIN-FERROUS GLUCONATE 9 MG IRON/15 ML LIQD] Take by mouth. (Patient not taking: Reported on 10/18/2021), Disp: , Rfl:      polyethylene glycol (MIRALAX) 17 GM/Dose powder, Mix 1/2 capful daily as needed for constipation or constipation prevention in 8 oz of liquid and drink., Disp: 1 g, Rfl: 0     propranolol ER (INDERAL LA) 60 MG 24 hr capsule, Take 80 mg by mouth daily , Disp: , Rfl:      simvastatin (ZOCOR) 20 MG tablet, Take 1 tablet (20 mg) by mouth At Bedtime, Disp: 30 tablet, Rfl: 11     topiramate (TOPAMAX) 25 MG tablet, Take 25 mg by mouth daily, Disp: , Rfl:      triamcinolone (KENALOG) 0.1 % external ointment, Apply topically 3 times daily as needed (skin irritation), Disp: 80 g, Rfl: 4    Allergies  Allergies   Allergen Reactions     Dust Mites        Social History   Social History     Socioeconomic History     Marital status: Single     Spouse name: Not on file     Number of children: Not on file     Years of education: Not on file     Highest education level: Not on file   Occupational History     Not on file   Tobacco Use     Smoking status: Never Smoker     Smokeless tobacco: Never Used   Vaping Use     Vaping Use: Never used   Substance and Sexual Activity     Alcohol use: Never     Drug use: Never     Sexual activity: Never   Other Topics Concern     Not on file   Social History Narrative     Not on file     Social Determinants of Health     Financial Resource Strain:      Difficulty of Paying Living Expenses:    Food Insecurity:      Worried About Running Out of Food in the Last Year:      Ran Out of Food in the Last Year:    Transportation Needs:      Lack of Transportation (Medical):      Lack of Transportation (Non-Medical):    Physical Activity:      Days of Exercise  per Week:      Minutes of Exercise per Session:    Stress:      Feeling of Stress :    Social Connections:      Frequency of Communication with Friends and Family:      Frequency of Social Gatherings with Friends and Family:      Attends Tenriism Services:      Active Member of Clubs or Organizations:      Attends Club or Organization Meetings:      Marital Status:    Intimate Partner Violence:      Fear of Current or Ex-Partner:      Emotionally Abused:      Physically Abused:      Sexually Abused:        Family History  Family History   Problem Relation Age of Onset     Unknown/Adopted Mother        Review of Systems  As per HPI and PMHx, otherwise 10+ comprehensive system review is negative.    Physical Exam  BP 99/60 (BP Location: Left arm, Patient Position: Chair, Cuff Size: Adult Regular)   Pulse 58   Temp 98.2  F (36.8  C) (Tympanic)   Wt 85.3 kg (188 lb)   BMI 30.34 kg/m    GENERAL: Patient is a pleasant, cooperative 41 year old female in no acute distress.  HEAD: Normocephalic, atraumatic.  Hair and scalp are normal.  EYES: Pupils are equal, round, reactive to light and accommodation.  Extraocular movements are intact.  The sclera nonicteric without injection.  The extraocular structures are normal.  EARS: Normal shape and symmetry.  No tenderness when palpating the mastoid or tragal areas bilaterally.  Otoscopic exam reveals a minimal  NEUROLOGIC: Cranial nerves II through XII are grossly intact.  Voice is strong.  Facial nerve examination incomplete due to the patient wearing a mask.  CARDIOVASCULAR: Extremities are warm and well-perfused.  No significant peripheral edema.  RESPIRATORY: Patient has nonlabored breathing without cough, wheeze, stridor.  PSYCHIATRIC: Patient is alert and oriented.  Mood and affect appear normal.  SKIN: Warm and dry.  No scalp, face, or neck lesions noted.    Audiogram  The patient underwent an audiogram performed today.  My review of the audiogram shows mild conductive  hearing loss in the right ear and normal hearing in the left ear.  Pure-tone average is 19 dB on the right and 8 dB on the left.  Speech reception threshold is 20 dB on the right and 5 dB on the left.  The patient had 92% word recognition on the right and 96% word recognition on the left.  The patient had a type A deep tympanogram on the right and a type A deep tympanogram on the left.      Assessment and Plan     ICD-10-CM    1. Conductive hearing loss of right ear with unrestricted hearing of left ear  H90.11 Adult Audiology Referral   2. Normal ear exam  Z01.10      It was my pleasure seeing Tabitha Payton today in clinic.  The patient presents today with a slight conductive hearing loss in the right ear versus her left ear.  Her ear exam is within normal limits.  I can find no evidence of serious middle ear pathology or other complicating factors that could be causing this.  We spent the remainder of today's visit on education. We discussed hearing protection in noisy environments.    The patient will follow up as necessary for worsening symptoms or changes in symptoms. I have also recommended repeat audiogram in 1-3 years, or sooner if symptoms warrant.      Bossman Rosales MD  Department of Otolaryngology-Head and Neck Surgery  [facility]         Again, thank you for allowing me to participate in the care of your patient.        Sincerely,        Bossman Rosales MD

## 2021-10-25 NOTE — PROGRESS NOTES
AUDIOLOGY REPORT    SUBJECTIVE:  Tabitha Payton is a 41 year old female who was seen at [facility] for an audiologic evaluation, referred by Dr. Rosales.  No previous audiograms are available at today's appointment. The patient is here today with her group home staff for her annual hearing evaluation. The patient reports she has been told her hearing is better in her left ear. She feels like she has cerumen in her ears. The patient denies bilateral tinnitus, bilateral otalgia and bilateral drainage.     OBJECTIVE:    Otoscopic exam indicates ears are clear of cerumen bilaterally       Pure Tone Thresholds assessed using conventional audiometry with good  reliability from 250-8000 Hz bilaterally using insert earphones and circumaural headphones     RIGHT:  normal and mild conductive hearing loss    LEFT:    normal hearing thresholds    Tympanogram:    RIGHT: hyper eardrum mobility (Type Ad)    LEFT:   hyper eardrum mobility (Type Ad)    Reflexes (reported by stimulus ear 1000 Hz):     RIGHT: CNT    RIGHT: CNT    LEFT: CNT    LEFT: CNT  Note: TM too noisy for measurements    Speech Reception Threshold:    RIGHT: 20 dB HL    LEFT:   5 dB HL  Word Recognition Score:     RIGHT: 92% at 60 dB HL using NU-6 recorded word list.    LEFT:   96% at 50 dB HL using NU-6 recorded word list.      ASSESSMENT:   Normal hearing in the left ear with a normal to mild conductive hearing loss in the right ear.     Today s results were discussed with the patient and her group home staff in detail. A copy of today's audiogram was given for the group home records.     PLAN: It is recommended that the patient be seen by Dr. Rosales for medical evaluation of their ears and hearing evaluation. Patient was counseled regarding hearing loss and impact on communication.    Please call this clinic with questions regarding these results or recommendations.        Jaida Brooks M.A. MAGNO-AAA  Clinical audiologist [place] #  [id number]  10/25/2021

## 2022-01-05 DIAGNOSIS — L40.50 PSORIATIC ARTHRITIS (H): ICD-10-CM

## 2022-01-05 NOTE — TELEPHONE ENCOUNTER
Refill request from [facility]    Medication: Otezla 30mg  Last Refill: 10/18/21  Last OV: 10/18/21  Last lab: 10/12/21  Future lab: 1/5/22  Future OV: 1/18/22

## 2022-01-06 ENCOUNTER — LAB (OUTPATIENT)
Dept: LAB | Facility: CLINIC | Age: 42
End: 2022-01-06
Payer: COMMERCIAL

## 2022-01-06 DIAGNOSIS — D72.829 LEUKOCYTOSIS, UNSPECIFIED TYPE: ICD-10-CM

## 2022-01-06 DIAGNOSIS — M25.50 POLYARTHRALGIA: ICD-10-CM

## 2022-01-06 DIAGNOSIS — Z79.899 HIGH RISK MEDICATION USE: ICD-10-CM

## 2022-01-06 LAB
ALBUMIN SERPL-MCNC: 3.1 G/DL (ref 3.4–5)
ALT SERPL W P-5'-P-CCNC: 35 U/L (ref 0–50)
BASOPHILS # BLD AUTO: 0 10E3/UL (ref 0–0.2)
BASOPHILS NFR BLD AUTO: 0 %
CREAT SERPL-MCNC: 0.72 MG/DL (ref 0.52–1.04)
EOSINOPHIL # BLD AUTO: 0.4 10E3/UL (ref 0–0.7)
EOSINOPHIL NFR BLD AUTO: 4 %
ERYTHROCYTE [DISTWIDTH] IN BLOOD BY AUTOMATED COUNT: 14.1 % (ref 10–15)
GFR SERPL CREATININE-BSD FRML MDRD: >90 ML/MIN/1.73M2
HCT VFR BLD AUTO: 39.9 % (ref 35–47)
HGB BLD-MCNC: 13.1 G/DL (ref 11.7–15.7)
LYMPHOCYTES # BLD AUTO: 2.1 10E3/UL (ref 0.8–5.3)
LYMPHOCYTES NFR BLD AUTO: 22 %
MCH RBC QN AUTO: 27.5 PG (ref 26.5–33)
MCHC RBC AUTO-ENTMCNC: 32.8 G/DL (ref 31.5–36.5)
MCV RBC AUTO: 84 FL (ref 78–100)
MONOCYTES # BLD AUTO: 0.7 10E3/UL (ref 0–1.3)
MONOCYTES NFR BLD AUTO: 7 %
NEUTROPHILS # BLD AUTO: 6.4 10E3/UL (ref 1.6–8.3)
NEUTROPHILS NFR BLD AUTO: 67 %
PLATELET # BLD AUTO: 335 10E3/UL (ref 150–450)
RBC # BLD AUTO: 4.76 10E6/UL (ref 3.8–5.2)
WBC # BLD AUTO: 9.6 10E3/UL (ref 4–11)

## 2022-01-06 PROCEDURE — 82040 ASSAY OF SERUM ALBUMIN: CPT

## 2022-01-06 PROCEDURE — 84460 ALANINE AMINO (ALT) (SGPT): CPT

## 2022-01-06 PROCEDURE — 85025 COMPLETE CBC W/AUTO DIFF WBC: CPT

## 2022-01-06 PROCEDURE — 36415 COLL VENOUS BLD VENIPUNCTURE: CPT

## 2022-01-06 PROCEDURE — 82565 ASSAY OF CREATININE: CPT

## 2022-01-06 RX ORDER — APREMILAST 30 MG/1
TABLET, FILM COATED ORAL
Qty: 60 TABLET | Refills: 0 | Status: SHIPPED | OUTPATIENT
Start: 2022-01-06 | End: 2022-02-02

## 2022-01-09 DIAGNOSIS — L40.50 PSORIATIC ARTHRITIS (H): ICD-10-CM

## 2022-01-09 DIAGNOSIS — L40.9 PSORIASIS: ICD-10-CM

## 2022-02-02 DIAGNOSIS — L40.50 PSORIATIC ARTHRITIS (H): ICD-10-CM

## 2022-02-02 RX ORDER — APREMILAST 30 MG/1
TABLET, FILM COATED ORAL
Qty: 60 TABLET | Refills: 0 | Status: SHIPPED | OUTPATIENT
Start: 2022-02-02 | End: 2022-02-07

## 2022-02-02 NOTE — TELEPHONE ENCOUNTER
Refill request from [facility]    Medication: Otezla 30mg  Last Refill: 1/23/22  Last OV: 10/18/21  Last lab: 1/6/21  Future OV: 2/7/22

## 2022-02-07 ENCOUNTER — VIRTUAL VISIT (OUTPATIENT)
Dept: RHEUMATOLOGY | Facility: CLINIC | Age: 42
End: 2022-02-07
Payer: COMMERCIAL

## 2022-02-07 DIAGNOSIS — Z79.899 HIGH RISK MEDICATION USE: ICD-10-CM

## 2022-02-07 DIAGNOSIS — L40.50 PSORIATIC ARTHRITIS (H): Primary | ICD-10-CM

## 2022-02-07 DIAGNOSIS — R41.89 COGNITIVE IMPAIRMENT: ICD-10-CM

## 2022-02-07 DIAGNOSIS — L40.9 PSORIASIS: ICD-10-CM

## 2022-02-07 PROCEDURE — 99214 OFFICE O/P EST MOD 30 MIN: CPT | Mod: GT | Performed by: INTERNAL MEDICINE

## 2022-02-07 RX ORDER — ESCITALOPRAM OXALATE 5 MG/1
TABLET ORAL
COMMUNITY
Start: 2021-10-01

## 2022-02-07 NOTE — PROGRESS NOTES
Adina is a 42 year old who is being evaluated via a billable video visit.      How would you like to obtain your AVS? MyChart  If the video visit is dropped, the invitation should be resent by: Text to cell phone: [phone]  Will anyone else be joining your video visit? No        Video-Visit Details    Type of service:  Video Visit    Start: 02/07/2022 01:02 pm  Stop: 02/07/2022 01:09 pm    Originating Location (pt. Location): Home    [facility]     Platform used for Video Visit: [employer]    This document was created using a software with less than 100% fidelity, at times resulting in unintended, even erroneous syntax and grammar.  The reader is advised to keep this under consideration while reviewing, interpreting this note.           ASSESSMENT AND PLAN:    Diagnoses and all orders for this visit:  Psoriatic arthritis (H)  Psoriasis  High risk medication use  Cognitive impairment      Follow up in Early July 2022, labs in 3 weeks and then q3 months       HISTORY OF PRESENTING ILLNESS:  Tabitha Payton 42 year old is evaluated here via video/audio link.  She is here for follow-up.  She has psoriatic arthritis, at one point she was thought to have psoriasis and seronegative rheumatoid arthritis.  She is a group home.  She is on 25 mg of methotrexate per week, she takes folic acid, Otezla.  Her recent labs are reviewed with her within acceptable range.  She reports no flareup of her joint symptoms during the past several months.  There is no stiffness in the morning.  She does not experience swelling.  She noted that psoriasis is under control with the current regimen.  She is able to do all things physical that she is expected to do.  She works at a local [employer].  She does not think her depression is gotten worse with Otezla.  However she thought Otezla was adding nothing to her overall regimen.  She had been on it this prior to coming here.  She  would like to discontinue Otezla.  There are no GI side effects.  She was assisted in today's conversation by another group home attendants.          ROS enquiry held for fever, ocular symptoms, rash, headache,  GI issues.  Today we also discussed the issues related to the current pandemic, the pros and cons of the current treatment plan, the CDC guidelines such as social distancing washing the hands covering the cough.  ALLERGIES:Dust mites    PAST MEDICAL/ACTIVE PROBLEMS/MEDICATION/SOCIAL DATA  Past Medical History:   Diagnosis Date     Psoriatic arthritis (H) 6/23/2021     Psoriatic arthritis (H) 6/23/2021     History   Smoking Status     Never Smoker   Smokeless Tobacco     Never Used     Patient Active Problem List   Diagnosis     Psoriasis     Psoriatic arthritis (H)     Bipolar 1 disorder (H)     Trauma and stressor-related disorder     Disorder of intellectual development     Overweight (BMI 25.0-29.9)     Current Outpatient Medications   Medication Sig Dispense Refill     apremilast (OTEZLA) 30 MG tablet TAKE 1 TABLET BY MOUTH 2 TIMES A DAY. 60 tablet 0     cetirizine (ZYRTEC) 10 MG tablet Take 1 tablet (10 mg) by mouth daily 30 tablet 11     drospirenone-ethinyl estradioL (LORYNA, 28,) 3-0.02 mg per tablet [DROSPIRENONE-ETHINYL ESTRADIOL (LORYNA, 28,) 3-0.02 MG PER TABLET] Take 1 tablet by mouth daily.       folic acid (FOLVITE) 1 MG tablet Take 1 tablet (1 mg) by mouth daily 90 tablet 4     INVEGA SUSTENNA 156 MG/ML COLETTE injection Inject one hundred fifty six (156) milligrams into the muscle as directed every 28 days please let clinic know if patient refuses       methotrexate 2.5 MG tablet Take 10 tablets (25 mg) by mouth once a week 40 tablet 0     mometasone (ELOCON) 0.1 % external solution Apply topically daily Apply to scalp and massage at bedtime and wash out in morning. 60 mL 11     Multiple Vitamins-Minerals (CERTAVITE SENIOR) TABS        multivit-min-ferrous gluconate 9 mg iron/15 mL Liqd  [MULTIVIT-MIN-FERROUS GLUCONATE 9 MG IRON/15 ML LIQD] Take by mouth. (Patient not taking: Reported on 10/18/2021)       polyethylene glycol (MIRALAX) 17 GM/Dose powder Mix 1/2 capful daily as needed for constipation or constipation prevention in 8 oz of liquid and drink. 1 g 0     propranolol ER (INDERAL LA) 60 MG 24 hr capsule Take 80 mg by mouth daily        simvastatin (ZOCOR) 20 MG tablet Take 1 tablet (20 mg) by mouth At Bedtime 30 tablet 11     topiramate (TOPAMAX) 25 MG tablet Take 25 mg by mouth daily       triamcinolone (KENALOG) 0.1 % external ointment Apply topically 3 times daily as needed (skin irritation) 80 g 4         EXAMINATION:    Using the audio and video link as best as possible the constitutional, neck, neurologic, psych, skin, both upper extremities areas/organ system were evaluated during this assessment.  Some of the important findings: Alert, oriented, speech fluent.     Able to fully flex the digits, into fists bilaterally, wrist and elbow range of motion appear normal, abduction of the shoulder is normal.      LAB / IMAGING DATA:  ALT   Date Value Ref Range Status   01/06/2022 35 0 - 50 U/L Final   10/12/2021 27 0 - 50 U/L Final   07/16/2021 27 0 - 50 U/L Final   03/29/2021 25 0 - 50 U/L Final     Albumin   Date Value Ref Range Status   01/06/2022 3.1 (L) 3.4 - 5.0 g/dL Final   10/12/2021 3.0 (L) 3.4 - 5.0 g/dL Final   07/16/2021 3.2 (L) 3.4 - 5.0 g/dL Final   03/29/2021 3.4 3.4 - 5.0 g/dL Final       WBC Count   Date Value Ref Range Status   01/06/2022 9.6 4.0 - 11.0 10e3/uL Final   10/12/2021 9.3 4.0 - 11.0 10e3/uL Final     Hemoglobin   Date Value Ref Range Status   01/06/2022 13.1 11.7 - 15.7 g/dL Final   10/12/2021 12.0 11.7 - 15.7 g/dL Final   08/24/2021 12.9 11.7 - 15.7 g/dL Final     Platelet Count   Date Value Ref Range Status   01/06/2022 335 150 - 450 10e3/uL Final   10/12/2021 301 150 - 450 10e3/uL Final   08/24/2021 189 150 - 450 10e3/uL Final       No results found for:  PONCHO

## 2022-02-08 ENCOUNTER — TELEPHONE (OUTPATIENT)
Dept: RHEUMATOLOGY | Facility: CLINIC | Age: 42
End: 2022-02-08

## 2022-02-08 ENCOUNTER — OFFICE VISIT (OUTPATIENT)
Dept: FAMILY MEDICINE | Facility: CLINIC | Age: 42
End: 2022-02-08
Payer: COMMERCIAL

## 2022-02-08 ENCOUNTER — MEDICAL CORRESPONDENCE (OUTPATIENT)
Dept: HEALTH INFORMATION MANAGEMENT | Facility: CLINIC | Age: 42
End: 2022-02-08

## 2022-02-08 VITALS
TEMPERATURE: 97.5 F | OXYGEN SATURATION: 98 % | SYSTOLIC BLOOD PRESSURE: 112 MMHG | DIASTOLIC BLOOD PRESSURE: 74 MMHG | WEIGHT: 198 LBS | BODY MASS INDEX: 31.08 KG/M2 | HEIGHT: 67 IN | HEART RATE: 71 BPM | RESPIRATION RATE: 16 BRPM

## 2022-02-08 DIAGNOSIS — J30.2 SEASONAL ALLERGIC RHINITIS, UNSPECIFIED TRIGGER: ICD-10-CM

## 2022-02-08 DIAGNOSIS — Z00.00 ROUTINE GENERAL MEDICAL EXAMINATION AT A HEALTH CARE FACILITY: Primary | ICD-10-CM

## 2022-02-08 DIAGNOSIS — E78.00 HIGH CHOLESTEROL: ICD-10-CM

## 2022-02-08 DIAGNOSIS — E66.09 CLASS 1 OBESITY DUE TO EXCESS CALORIES WITHOUT SERIOUS COMORBIDITY WITH BODY MASS INDEX (BMI) OF 31.0 TO 31.9 IN ADULT: ICD-10-CM

## 2022-02-08 DIAGNOSIS — E66.811 CLASS 1 OBESITY DUE TO EXCESS CALORIES WITHOUT SERIOUS COMORBIDITY WITH BODY MASS INDEX (BMI) OF 31.0 TO 31.9 IN ADULT: ICD-10-CM

## 2022-02-08 DIAGNOSIS — R73.03 PRE-DIABETES: ICD-10-CM

## 2022-02-08 PROCEDURE — 99214 OFFICE O/P EST MOD 30 MIN: CPT | Mod: 25 | Performed by: FAMILY MEDICINE

## 2022-02-08 PROCEDURE — 99396 PREV VISIT EST AGE 40-64: CPT | Performed by: FAMILY MEDICINE

## 2022-02-08 RX ORDER — METFORMIN HCL 500 MG
TABLET, EXTENDED RELEASE 24 HR ORAL
Qty: 270 TABLET | Refills: 1 | Status: SHIPPED | OUTPATIENT
Start: 2022-02-08 | End: 2022-07-05

## 2022-02-08 RX ORDER — DROSPIRENONE AND ETHINYL ESTRADIOL 0.02-3(28)
1 KIT ORAL DAILY
Qty: 30 TABLET | Refills: 11 | Status: SHIPPED | OUTPATIENT
Start: 2022-02-08 | End: 2023-01-11

## 2022-02-08 RX ORDER — SIMVASTATIN 20 MG
20 TABLET ORAL AT BEDTIME
Qty: 30 TABLET | Refills: 11 | Status: SHIPPED | OUTPATIENT
Start: 2022-02-08 | End: 2022-12-27

## 2022-02-08 RX ORDER — CETIRIZINE HYDROCHLORIDE 10 MG/1
10 TABLET ORAL DAILY
Qty: 30 TABLET | Refills: 11 | Status: SHIPPED | OUTPATIENT
Start: 2022-02-08 | End: 2023-01-18

## 2022-02-08 ASSESSMENT — MIFFLIN-ST. JEOR: SCORE: 1577.81
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
## 2022-02-08 NOTE — PATIENT INSTRUCTIONS
Keep up regular exercise and healthy eating.    Metformin 500mg XR   This medication is to help lower insulin resistance and blood sugar.  Start with one pill once a day with dinner.      Preventive Health Recommendations  Female Ages 40 to 49    Yearly exam:     See your health care provider every year in order to  1. Review health changes.   2. Discuss preventive care.    3. Review your medicines if your doctor prescribed any.      Get a Pap test every three years (unless you have an abnormal result and your provider advises testing more often).      If you get Pap tests with HPV test, you only need to test every 5 years, unless you have an abnormal result. You do not need a Pap test if your uterus was removed (hysterectomy) and you have not had cancer.      You should be tested each year for STDs (sexually transmitted diseases), if you're at risk.     Ask your doctor if you should have a mammogram.      Have a colonoscopy (test for colon cancer) if someone in your family has had colon cancer or polyps before age 50.       Have a cholesterol test every 5 years.       Have a diabetes test (fasting glucose) after age 45. If you are at risk for diabetes, you should have this test every 3 years.    Shots: Get a flu shot each year. Get a tetanus shot every 10 years.     Nutrition:     Eat at least 5 servings of fruits and vegetables each day.    Eat whole-grain bread, whole-wheat pasta and brown rice instead of white grains and rice.    Get adequate Calcium and Vitamin D.      Lifestyle    Exercise at least 150 minutes a week (an average of 30 minutes a day, 5 days a week). This will help you control your weight and prevent disease.    Limit alcohol to one drink per day.    No smoking.     Wear sunscreen to prevent skin cancer.    See your dentist every six months for an exam and cleaning.

## 2022-02-08 NOTE — TELEPHONE ENCOUNTER
"[facility] Call Center    Phone Message    May a detailed message be left on voicemail: no     Reason for Call: Form or Letter   Type or form/letter needing completion: [facility] reports they sent over a form to be completed at pt's visit yesterday (said our office confirmed that we had received it, titled \"Visit to the Healthcare Professional\"), they are still waiting for that form to be faxed back.   They are also waiting to receive a copy of the after-visit summary, or some other documentation that describes what was done and any changes that were made (says that one of patient's medications was stopped, but they need an order from the doctor or some other form of documentation stating this).   Provider: Dr. Ingram  Date form needed: ASAP  Once completed: Fax form to: [phone]   For any questions, please call [phone]      Action Taken: Message routed to:  Other: RHEUMATOLOGY SUPPORT POOL    Travel Screening: Not Applicable   "

## 2022-02-09 ENCOUNTER — TELEPHONE (OUTPATIENT)
Dept: FAMILY MEDICINE | Facility: CLINIC | Age: 42
End: 2022-02-09
Payer: COMMERCIAL

## 2022-02-09 DIAGNOSIS — L30.4 INTERTRIGO: Primary | ICD-10-CM

## 2022-02-09 RX ORDER — NYSTATIN 100000 U/G
OINTMENT TOPICAL 2 TIMES DAILY
Qty: 30 G | Refills: 1 | Status: SHIPPED | OUTPATIENT
Start: 2022-02-09 | End: 2022-02-23

## 2022-02-09 NOTE — TELEPHONE ENCOUNTER
ASSESSMENT/PLAN  Tabitha was seen today for wounds.    Diagnoses and all orders for this visit:    Intertrigo  -     nystatin (MYCOSTATIN) 480593 UNIT/GM external ointment; Apply topically 2 times daily for 14 days Then if needed for skin fold yeast infections in the future for up to 14 days.    try to keep the skin dry and from rubbing with either soft flannel or cotton or lambs wool in between the skin.  Change clothing when moist.    Nicolas Loaiza MD  [facility]

## 2022-02-09 NOTE — TELEPHONE ENCOUNTER
claudia -- house lead,--group home--called stating that patient has 3 open wounds- one stomach 6 inches long, 1/8 wide. and 2 on each side of wear rides.(groin area) rightside -spotting blood, was able to wash the area.  each wound is 3 inch long.     Requesting rx for nystatin --[facility]: [facility] [place].       She report wound are due to skin to skin contact and moisture.        Tracey CHILD  Station

## 2022-02-10 NOTE — TELEPHONE ENCOUNTER
I called and spoke with house lead, Graciela.    Graciela asks that I fax a copy of these instructions to them at [phone].    Printed and faxed.    Madie Dalton RN

## 2022-02-11 ENCOUNTER — TELEPHONE (OUTPATIENT)
Dept: FAMILY MEDICINE | Facility: CLINIC | Age: 42
End: 2022-02-11
Payer: COMMERCIAL

## 2022-02-12 NOTE — TELEPHONE ENCOUNTER
[facility] ICD 10 request form and med list faxed to [phone]. Copy placed in basket.

## 2022-02-13 DIAGNOSIS — Z78.9 OTHER SPECIFIED HEALTH STATUS: ICD-10-CM

## 2022-02-14 ENCOUNTER — MEDICAL CORRESPONDENCE (OUTPATIENT)
Dept: HEALTH INFORMATION MANAGEMENT | Facility: CLINIC | Age: 42
End: 2022-02-14

## 2022-02-14 RX ORDER — MULTIVIT-MIN/FA/LYCOPEN/LUTEIN .4-300-25
TABLET ORAL
Qty: 30 TABLET | Refills: 2 | Status: SHIPPED | OUTPATIENT
Start: 2022-02-14 | End: 2022-12-20

## 2022-02-16 ENCOUNTER — LAB (OUTPATIENT)
Dept: LAB | Facility: CLINIC | Age: 42
End: 2022-02-16
Payer: COMMERCIAL

## 2022-02-16 DIAGNOSIS — Z00.00 ROUTINE GENERAL MEDICAL EXAMINATION AT A HEALTH CARE FACILITY: ICD-10-CM

## 2022-02-16 DIAGNOSIS — R73.03 PRE-DIABETES: ICD-10-CM

## 2022-02-16 LAB
CHOLEST SERPL-MCNC: 148 MG/DL
FASTING STATUS PATIENT QL REPORTED: YES
FASTING STATUS PATIENT QL REPORTED: YES
GLUCOSE BLD-MCNC: 85 MG/DL (ref 70–99)
HBA1C MFR BLD: 5.4 % (ref 0–5.6)
HDLC SERPL-MCNC: 57 MG/DL
LDLC SERPL CALC-MCNC: 48 MG/DL
NONHDLC SERPL-MCNC: 91 MG/DL
TRIGL SERPL-MCNC: 216 MG/DL

## 2022-02-16 PROCEDURE — 36415 COLL VENOUS BLD VENIPUNCTURE: CPT

## 2022-02-16 PROCEDURE — 82947 ASSAY GLUCOSE BLOOD QUANT: CPT

## 2022-02-16 PROCEDURE — 83036 HEMOGLOBIN GLYCOSYLATED A1C: CPT

## 2022-02-16 PROCEDURE — 80061 LIPID PANEL: CPT

## 2022-02-22 ENCOUNTER — TELEPHONE (OUTPATIENT)
Dept: FAMILY MEDICINE | Facility: CLINIC | Age: 42
End: 2022-02-22
Payer: COMMERCIAL

## 2022-02-22 DIAGNOSIS — L30.4 INTERTRIGO: ICD-10-CM

## 2022-02-22 NOTE — TELEPHONE ENCOUNTER
Reason for Call:  Other prescription    Detailed comments: Patient is asking for more refill than the one she has. She said once the refill is done she wants more refills to have as a PRN  Pending Prescriptions:                       Disp   Refills    nystatin (MYCOSTATIN) 959871 UNIT/GM exte*30 g   1            Sig: Apply topically 2 times daily Then if needed for           skin fold yeast infections in the future for up           to 14 days.      Phone Number Patient can be reached at: Home number on file [phone] (home)    Best Time: any    Can we leave a detailed message on this number? YES    Call taken on 2/22/2022 at 9:26 AM by Mima Palacios

## 2022-02-23 ENCOUNTER — TELEPHONE (OUTPATIENT)
Dept: FAMILY MEDICINE | Facility: CLINIC | Age: 42
End: 2022-02-23
Payer: COMMERCIAL

## 2022-02-23 RX ORDER — NYSTATIN 100000 U/G
OINTMENT TOPICAL 2 TIMES DAILY PRN
Qty: 30 G | Refills: 11 | Status: SHIPPED | OUTPATIENT
Start: 2022-02-23 | End: 2023-06-16

## 2022-02-23 NOTE — TELEPHONE ENCOUNTER
Reason for call:    Symptom or request:     Patient called stating that she is having diarrhea from her metformin. She started med 02/08/22-- she followed directions within increasing. Since she started with 3 tablet a day she is having diarrhea and green stools. Had no problems until she started with 3 tablet twice daily. .       Route: Take 1 tablet (500 mg) by mouth daily (with dinner) for 7 days, THEN 2 tablets (1,000 mg) daily (with dinner) for 7 days, THEN 3 tablets (1,500 mg) daily (with dinner). - Oral      Pharmacy: [facility] in [place].     Best Time:  any    Can we leave a detailed message on this number?  YES     Tracey CHILD  Station

## 2022-02-23 NOTE — TELEPHONE ENCOUNTER
Pt is having diarrhea on and off since she increased her dose of metformin.  She states Dr Loaiza told her to monitor for that.  Pt will drink at least 8 glasses of water a day and stay hydrated.  Pt will call back if the diarrhea doesn't get better.

## 2022-03-01 ENCOUNTER — TELEPHONE (OUTPATIENT)
Dept: RHEUMATOLOGY | Facility: CLINIC | Age: 42
End: 2022-03-01

## 2022-03-01 NOTE — TELEPHONE ENCOUNTER
LMTCB to inform leela from [facility] [place] that we need these labs drawn to continue to refill patients medications.

## 2022-03-01 NOTE — TELEPHONE ENCOUNTER
Graciela from patient's group home calling stating that patient is refusing labs. They are trying to get her to go in but not sure if they will be able to.

## 2022-03-01 NOTE — TELEPHONE ENCOUNTER
Graciela informed and is aware. Graciela stated they wanted it noted in account she is coming just not anytime soon. Confirmed they did make appointment for 3/15/22

## 2022-03-15 ENCOUNTER — LAB (OUTPATIENT)
Dept: LAB | Facility: CLINIC | Age: 42
End: 2022-03-15
Payer: COMMERCIAL

## 2022-03-15 DIAGNOSIS — Z79.899 HIGH RISK MEDICATION USE: ICD-10-CM

## 2022-03-15 DIAGNOSIS — M25.50 POLYARTHRALGIA: ICD-10-CM

## 2022-03-15 LAB
ALBUMIN SERPL-MCNC: 3.4 G/DL (ref 3.4–5)
ALT SERPL W P-5'-P-CCNC: 28 U/L (ref 0–50)
CREAT SERPL-MCNC: 0.69 MG/DL (ref 0.52–1.04)
ERYTHROCYTE [DISTWIDTH] IN BLOOD BY AUTOMATED COUNT: 14.6 % (ref 10–15)
GFR SERPL CREATININE-BSD FRML MDRD: >90 ML/MIN/1.73M2
HCT VFR BLD AUTO: 39 % (ref 35–47)
HGB BLD-MCNC: 12.5 G/DL (ref 11.7–15.7)
MCH RBC QN AUTO: 27.4 PG (ref 26.5–33)
MCHC RBC AUTO-ENTMCNC: 32.1 G/DL (ref 31.5–36.5)
MCV RBC AUTO: 86 FL (ref 78–100)
PLATELET # BLD AUTO: 335 10E3/UL (ref 150–450)
RBC # BLD AUTO: 4.56 10E6/UL (ref 3.8–5.2)
WBC # BLD AUTO: 9.2 10E3/UL (ref 4–11)

## 2022-03-15 PROCEDURE — 82565 ASSAY OF CREATININE: CPT

## 2022-03-15 PROCEDURE — 36415 COLL VENOUS BLD VENIPUNCTURE: CPT

## 2022-03-15 PROCEDURE — 85027 COMPLETE CBC AUTOMATED: CPT

## 2022-03-15 PROCEDURE — 84460 ALANINE AMINO (ALT) (SGPT): CPT

## 2022-03-15 PROCEDURE — 82040 ASSAY OF SERUM ALBUMIN: CPT

## 2022-03-22 ENCOUNTER — NURSE TRIAGE (OUTPATIENT)
Dept: NURSING | Facility: CLINIC | Age: 42
End: 2022-03-22
Payer: COMMERCIAL

## 2022-03-23 NOTE — TELEPHONE ENCOUNTER
Caregiver Graciela has called back and she will go on our website and let patient pick a new provider, and they will make an appointment.  The rash is improving but still there.

## 2022-03-23 NOTE — TELEPHONE ENCOUNTER
Denita from pt's group home calling for advice. Pt has been using the Nystain ointment for yeast in between her skin folds.  Denita believes the yeast has subsided but pt continues to have redness and burning in these areas (L groin fold, under abdomen/fold).      Denita denies a fever, she doesn't think the area is infected.      Triage disposition:  See a provider within 3 days.  She verbalized understanding and had no further questions.  Denita tried scheduling an appt but no availability with PCP until 04/04.  Denita wondering if pt can be fit into provider schedule this week.  Pt is very self-conscious and would like to see PCP.      Denita can be reached at [phone]    COVID 19 Nurse Triage Plan/Patient Instructions    Please be aware that novel coronavirus (COVID-19) may be circulating in the community. If you develop symptoms such as fever, cough, or SOB or if you have concerns about the presence of another infection including coronavirus (COVID-19), please contact your health care provider or visit [email or web address].     Disposition/Instructions    In-Person Visit with provider recommended. Reference Visit Selection Guide.    Thank you for taking steps to prevent the spread of this virus.  o Limit your contact with others.  o Wear a simple mask to cover your cough.  o Wash your hands well and often.    Resources    [facility]: About COVID-19: [email or web address]    CDC: What to Do If You're Sick: www.cdc.gov/coronavirus/2019-ncov/about/steps-when-sick.html    CDC: Ending Home Isolation: www.cdc.gov/coronavirus/2019-ncov/hcp/disposition-in-home-patients.html     CDC: Caring for Someone: www.cdc.gov/coronavirus/2019-ncov/if-you-are-sick/care-for-someone.html     [facility]: Interim Guidance for Hospital Discharge to Home: www.health.Counts include 234 beds at the [facility].[place]/diseases/coronavirus/hcp/hospdischarge.pdf    [facility] clinical trials (COVID-19 research studies):  clinicalaffairs.[facility]-clinical-trials     Below are the COVID-19 hotlines at the [place] Department of Health ([facility]). Interpreters are available.   o For health questions: Call 425-629-8184 or 1-283.185.5132 (7 a.m. to 7 p.m.)  o For questions about schools and childcare: Call 331-454-5712 or 1-626.397.2014 (7 a.m. to 7 p.m.)               Charlene Presley RN  [facility] Nurse Advisor        Reason for Disposition    [1] After 14 days AND [2] wound isn't healed    Additional Information    Negative: [1] Major bleeding (e.g., actively dripping or spurting) AND [2] can't be stopped    Negative: Amputation    Negative: Shock suspected (e.g., cold/pale/clammy skin, too weak to stand, low BP, rapid pulse)    Negative: Sounds like a life-threatening emergency to the triager    Negative: [1] Bleeding AND [2] won't stop after 10 minutes of direct pressure (using correct technique)    Negative: Skin is split open or gaping (or length > 1/2 inch or 12 mm on the skin, 1/4 inch or 6 mm on the face)    Negative: [1] Deep cut AND [2] can see bone or tendons    Negative: [1] Dirt in the wound AND [2] not removed with 15 minutes of scrubbing    Negative: Skin loss involves more than 10% of surface area (Note: the palm of the hand = 1%)    Negative: High pressure injection injury (e.g., from paint gun, usually work-related)    Negative: Wound causes numbness (i.e., loss of sensation)    Negative: Wound causes weakness (i.e., decreased ability to move hand, finger, toe)    Negative: Sounds like a serious injury to the triager    Negative: [1] SEVERE pain AND [2] not improved 2 hours after pain medicine/ice packs    Negative: [1] Fever AND [2] bright red area or streak    Negative: [1] Looks infected AND [2] large red area or streak (>2 inches or 5 cm)    Negative: Suspicious history for the injury    Negative: [1] Raised bruise AND [2] size > 2 inches (5 cm) AND [3] expanding    Negative: [1] Looks infected  (spreading redness, pus) AND [2] no fever    Negative: No prior tetanus shots (or is not fully vaccinated)    Negative: [1] HIV positive or severe immunodeficiency (severely weak immune system) AND [2] DIRTY cut or scrape    Negative: [1] Last tetanus shot > 5 years ago AND [2] DIRTY cut or scrape    Negative: [1] Last tetanus shot > 10 years ago AND [2] CLEAN cut or scrape (e.g., object AND skin were clean)    Protocols used: SKIN INJURY-A-AH

## 2022-03-23 NOTE — TELEPHONE ENCOUNTER
Left non-detailed message for Denita group home staff, to return a call to the clinic RN.   Pt needs to be seen for ongoing rash.  She can see a partner if cannot get on Dr Loaiza's schedule.   DANYELL aDlton RN

## 2022-03-23 NOTE — TELEPHONE ENCOUNTER
Clinic Action Needed: Yes, please call Denita back at [phone]    Presenting Problem: See FNA triage encounter below    Routed to: PCP care team    Charlene Presley RN  [facility] Nurse Advisor

## 2022-05-12 ENCOUNTER — OFFICE VISIT (OUTPATIENT)
Dept: FAMILY MEDICINE | Facility: CLINIC | Age: 42
End: 2022-05-12
Payer: COMMERCIAL

## 2022-05-12 DIAGNOSIS — Z53.9 ERRONEOUS ENCOUNTER--DISREGARD: Primary | ICD-10-CM

## 2022-05-12 RX ORDER — PALIPERIDONE PALMITATE 819 MG/2.625ML
INJECTION, SUSPENSION, EXTENDED RELEASE INTRAMUSCULAR
COMMUNITY
Start: 2022-03-16

## 2022-05-12 RX ORDER — TOPIRAMATE 25 MG/1
CAPSULE, EXTENDED RELEASE ORAL
COMMUNITY
Start: 2022-05-04

## 2022-05-12 RX ORDER — OLANZAPINE 10 MG/1
TABLET ORAL
COMMUNITY
Start: 2022-02-15

## 2022-06-09 ENCOUNTER — LAB (OUTPATIENT)
Dept: LAB | Facility: CLINIC | Age: 42
End: 2022-06-09
Payer: COMMERCIAL

## 2022-06-09 DIAGNOSIS — M25.50 POLYARTHRALGIA: ICD-10-CM

## 2022-06-09 DIAGNOSIS — Z79.899 HIGH RISK MEDICATION USE: ICD-10-CM

## 2022-06-09 LAB
ALBUMIN SERPL-MCNC: 3.2 G/DL (ref 3.4–5)
ALT SERPL W P-5'-P-CCNC: 22 U/L (ref 0–50)
CREAT SERPL-MCNC: 0.72 MG/DL (ref 0.52–1.04)
ERYTHROCYTE [DISTWIDTH] IN BLOOD BY AUTOMATED COUNT: 14.3 % (ref 10–15)
GFR SERPL CREATININE-BSD FRML MDRD: >90 ML/MIN/1.73M2
HCT VFR BLD AUTO: 39.3 % (ref 35–47)
HGB BLD-MCNC: 12.5 G/DL (ref 11.7–15.7)
MCH RBC QN AUTO: 27.2 PG (ref 26.5–33)
MCHC RBC AUTO-ENTMCNC: 31.8 G/DL (ref 31.5–36.5)
MCV RBC AUTO: 86 FL (ref 78–100)
PLATELET # BLD AUTO: 332 10E3/UL (ref 150–450)
RBC # BLD AUTO: 4.59 10E6/UL (ref 3.8–5.2)
WBC # BLD AUTO: 10.1 10E3/UL (ref 4–11)

## 2022-06-09 PROCEDURE — 84460 ALANINE AMINO (ALT) (SGPT): CPT

## 2022-06-09 PROCEDURE — 82565 ASSAY OF CREATININE: CPT

## 2022-06-09 PROCEDURE — 36415 COLL VENOUS BLD VENIPUNCTURE: CPT

## 2022-06-09 PROCEDURE — 82040 ASSAY OF SERUM ALBUMIN: CPT

## 2022-06-09 PROCEDURE — 85027 COMPLETE CBC AUTOMATED: CPT

## 2022-06-13 DIAGNOSIS — L40.9 PSORIASIS: ICD-10-CM

## 2022-06-13 RX ORDER — MOMETASONE FUROATE 1 MG/ML
SOLUTION TOPICAL DAILY
Qty: 60 ML | Refills: 0 | Status: SHIPPED | OUTPATIENT
Start: 2022-06-13 | End: 2022-07-12

## 2022-07-02 DIAGNOSIS — L40.50 PSORIATIC ARTHRITIS (H): ICD-10-CM

## 2022-07-02 DIAGNOSIS — L40.9 PSORIASIS: ICD-10-CM

## 2022-07-11 ENCOUNTER — VIRTUAL VISIT (OUTPATIENT)
Dept: RHEUMATOLOGY | Facility: CLINIC | Age: 42
End: 2022-07-11
Payer: COMMERCIAL

## 2022-07-11 DIAGNOSIS — L40.9 PSORIASIS: ICD-10-CM

## 2022-07-11 DIAGNOSIS — L40.50 PSORIATIC ARTHRITIS (H): Primary | ICD-10-CM

## 2022-07-11 DIAGNOSIS — R41.89 COGNITIVE IMPAIRMENT: ICD-10-CM

## 2022-07-11 DIAGNOSIS — Z79.899 HIGH RISK MEDICATION USE: ICD-10-CM

## 2022-07-11 PROCEDURE — 99214 OFFICE O/P EST MOD 30 MIN: CPT | Mod: GT | Performed by: INTERNAL MEDICINE

## 2022-07-11 NOTE — PROGRESS NOTES
Adina is a 42 year old who is being evaluated via a billable video visit.      How would you like to obtain your AVS? Jozefhart  If the video visit is dropped, the invitation should be resent by: [phone]  Will anyone else be joining your video visit? Nellie Farley- Staff         Video-Visit Details       Type of service:  Video Visit    Start: 07/11/2022 12:46 pm  Stop: 07/11/2022 12:54 pm    Originating Location (pt. Location): Long term Care    Distant Location (provider location):  [facility]     Platform used for Video Visit: [employer]    This document was created using a software with less than 100% fidelity, at times resulting in unintended, even erroneous syntax and grammar.  The reader is advised to keep this under consideration while reviewing, interpreting this note.           ASSESSMENT AND PLAN:    Diagnoses and all orders for this visit:  Psoriatic arthritis (H)  Psoriasis  High risk medication use  Cognitive impairment      Follow up in 3 months, preferably in person, continue current regimen.      HISTORY OF PRESENTING ILLNESS:  Tabitha Payton 42 year old is evaluated here via video/audio link. She has psoriatic arthritis, at one point she was thought to have psoriasis and seronegative rheumatoid arthritis.  She is a group home.  She is on 25 mg of methotrexate per week, she takes folic acid, as noted previously Otezla was discontinued.  Today's conversation and consultation happened to be the skills development/list rationale as the patient refused to, in front of the camera and would only talk to her shallow.    Otezla.  Her recent labs are reviewed with her within acceptable range.  She reports no flareup of her joint symptoms during the past several months.  There is no stiffness in the morning.  She does not experience swelling.  She noted that psoriasis is under control with the current regimen.  She is able to do all things physical that she is expected to do.   She works at a local [employer].  She does not think her depression is gotten worse with Otezla.  However she thought Otezla was adding nothing to her overall regimen.  She had been on it this prior to coming here.  She would like to discontinue Otezla.  There are no GI side effects.  She was assisted in today's conversation by another group home attendants.          ROS enquiry held for fever, ocular symptoms, rash, headache,  GI issues.  Today we also discussed the issues related to the current pandemic, the pros and cons of the current treatment plan, the CDC guidelines such as social distancing washing the hands covering the cough.  ALLERGIES:Animal dander, Dust mites, and Seasonal allergies    PAST MEDICAL/ACTIVE PROBLEMS/MEDICATION/SOCIAL DATA  Past Medical History:   Diagnosis Date     Psoriatic arthritis (H) 6/23/2021     Psoriatic arthritis (H) 6/23/2021     History   Smoking Status     Never Smoker   Smokeless Tobacco     Never Used     Patient Active Problem List   Diagnosis     Psoriasis     Psoriatic arthritis (H)     Bipolar 1 disorder (H)     Trauma and stressor-related disorder     Disorder of intellectual development     Class 1 obesity due to excess calories without serious comorbidity with body mass index (BMI) of 31.0 to 31.9 in adult     Current Outpatient Medications   Medication Sig Dispense Refill     cetirizine (ZYRTEC) 10 MG tablet Take 1 tablet (10 mg) by mouth daily 30 tablet 11     drospirenone-ethinyl estradiol (RONNA) 3-0.02 MG tablet Take 1 tablet by mouth daily 30 tablet 11     escitalopram (LEXAPRO) 5 MG tablet        folic acid (FOLVITE) 1 MG tablet Take 1 tablet (1 mg) by mouth daily 90 tablet 4     INVEGA TRINZA 819 MG/2.63ML COLETTE intramuscular 3 month injection        metFORMIN (GLUCOPHAGE XR) 500 MG 24 hr tablet Take THREE tablets (1,500 MG) daily (with dinner).] 270 tablet 0     methotrexate 2.5 MG tablet Take 10 tablets (25 mg) by mouth once a week 40 tablet 0     mometasone  (ELOCON) 0.1 % external solution Apply topically daily Apply to scalp and massage at bedtime and wash out in morning. 60 mL 0     Multiple Vitamins-Minerals (CERTAVITE SENIOR/ANTIOXIDANT) TABS Take 1 tablet by mouth daily 30 tablet 2     nystatin (MYCOSTATIN) 405771 UNIT/GM external ointment Apply topically 2 times daily as needed for dry skin 30 g 11     OLANZapine (ZYPREXA) 10 MG tablet TAKE 1/2 TABLET BY MOUTH TWICE DAILY AS NEEDED       propranolol ER (INDERAL LA) 60 MG 24 hr capsule Take 80 mg by mouth daily        simvastatin (ZOCOR) 20 MG tablet Take 1 tablet (20 mg) by mouth At Bedtime 30 tablet 11     topiramate (TOPAMAX) 25 MG tablet Take 25 mg by mouth daily       topiramate ER (QUDEXY XR) 25 MG 24 hr capsule        triamcinolone (KENALOG) 0.1 % external ointment Apply topically 3 times daily as needed (skin irritation) 80 g 4     polyethylene glycol (MIRALAX) 17 GM/Dose powder Mix 1/2 capful daily as needed for constipation or constipation prevention in 8 oz of liquid and drink. (Patient not taking: No sig reported) 1 g 0         EXAMINATION:   Not performed directly only could hear her speaking from the next room.    LAB / IMAGING DATA:  ALT   Date Value Ref Range Status   06/09/2022 22 0 - 50 U/L Final   03/15/2022 28 0 - 50 U/L Final   01/06/2022 35 0 - 50 U/L Final   03/29/2021 25 0 - 50 U/L Final     Albumin   Date Value Ref Range Status   06/09/2022 3.2 (L) 3.4 - 5.0 g/dL Final   03/15/2022 3.4 3.4 - 5.0 g/dL Final   01/06/2022 3.1 (L) 3.4 - 5.0 g/dL Final   03/29/2021 3.4 3.4 - 5.0 g/dL Final       WBC Count   Date Value Ref Range Status   06/09/2022 10.1 4.0 - 11.0 10e3/uL Final   03/15/2022 9.2 4.0 - 11.0 10e3/uL Final     Hemoglobin   Date Value Ref Range Status   06/09/2022 12.5 11.7 - 15.7 g/dL Final   03/15/2022 12.5 11.7 - 15.7 g/dL Final   01/06/2022 13.1 11.7 - 15.7 g/dL Final     Platelet Count   Date Value Ref Range Status   06/09/2022 332 150 - 450 10e3/uL Final   03/15/2022 335 150 -  450 10e3/uL Final   01/06/2022 335 150 - 450 10e3/uL Final       No results found for: PONCHO

## 2022-07-12 RX ORDER — MOMETASONE FUROATE 1 MG/ML
SOLUTION TOPICAL DAILY
Qty: 60 ML | Refills: 0 | Status: SHIPPED | OUTPATIENT
Start: 2022-07-12 | End: 2022-08-05

## 2022-08-02 ENCOUNTER — OFFICE VISIT (OUTPATIENT)
Dept: FAMILY MEDICINE | Facility: CLINIC | Age: 42
End: 2022-08-02
Payer: COMMERCIAL

## 2022-08-02 ENCOUNTER — TELEPHONE (OUTPATIENT)
Dept: FAMILY MEDICINE | Facility: CLINIC | Age: 42
End: 2022-08-02

## 2022-08-02 VITALS
WEIGHT: 198.4 LBS | SYSTOLIC BLOOD PRESSURE: 88 MMHG | TEMPERATURE: 98.6 F | OXYGEN SATURATION: 97 % | HEART RATE: 76 BPM | RESPIRATION RATE: 20 BRPM | DIASTOLIC BLOOD PRESSURE: 58 MMHG | BODY MASS INDEX: 31.54 KG/M2

## 2022-08-02 DIAGNOSIS — Z23 ENCOUNTER FOR IMMUNIZATION: ICD-10-CM

## 2022-08-02 DIAGNOSIS — Z12.4 CERVICAL CANCER SCREENING: ICD-10-CM

## 2022-08-02 DIAGNOSIS — F31.9 BIPOLAR 1 DISORDER (H): ICD-10-CM

## 2022-08-02 DIAGNOSIS — L30.9 DERMATITIS: ICD-10-CM

## 2022-08-02 DIAGNOSIS — R73.03 PRE-DIABETES: Primary | ICD-10-CM

## 2022-08-02 PROCEDURE — 99214 OFFICE O/P EST MOD 30 MIN: CPT | Performed by: FAMILY MEDICINE

## 2022-08-02 RX ORDER — METFORMIN HCL 500 MG
1500 TABLET, EXTENDED RELEASE 24 HR ORAL
Qty: 270 TABLET | Refills: 3 | Status: SHIPPED | OUTPATIENT
Start: 2022-08-02 | End: 2023-01-18

## 2022-08-02 RX ORDER — MINERAL OIL/HYDROPHIL PETROLAT
OINTMENT (GRAM) TOPICAL DAILY PRN
Qty: 420 G | Refills: 3 | Status: SHIPPED | OUTPATIENT
Start: 2022-08-02

## 2022-08-02 ASSESSMENT — PAIN SCALES - GENERAL: PAINLEVEL: NO PAIN (0)

## 2022-08-02 NOTE — PATIENT INSTRUCTIONS
Protect the skin daily with aquaphor as needed for dermatitis  Then do the physical barrier with flannel or cotton in the fold during the day as needed for dermatitis    Right now, not yeast, so we don't need the nystatin Nystatin is needed if it gets itchy and beefy red.

## 2022-08-02 NOTE — PROGRESS NOTES
"  Assessment & Plan     Pre-diabetes  Stable, no further weight gain, could consider ozempic in the future.  - metFORMIN (GLUCOPHAGE XR) 500 MG 24 hr tablet; Take 3 tablets (1,500 mg) by mouth daily with food lunch    Screening for HIV (human immunodeficiency virus)      Cervical cancer screening  declines    Encounter for immunization: needs guardian to sign form or approve immunization      Bipolar 1 disorder (H)  Seeing psychiatry, and stable  On Invega and recent labs stable    Dermatitis  In the abdominal skin fold  Apply aquaphor once daily and put flannel in the fold daily  If not improving then trial duoderm or tegaderm  - mineral oil-hydrophilic petrolatum (AQUAPHOR) external ointment; Apply topically daily as needed for irritation  - Miscellaneous DME Order       BMI:   Estimated body mass index is 31.54 kg/m  as calculated from the following:    Height as of 2/8/22: 1.689 m (5' 6.5\").    Weight as of this encounter: 90 kg (198 lb 6.4 oz).   Weight management plan: Discussed healthy diet and exercise guidelines    See Patient Instructions    Return in about 6 months (around 2/2/2023) for Routine preventive, with me, in person.    Nicolas Loaiza MD  [facility]    Jonna Holden is a 42 year old, presenting for the following health issues:  Recheck Medication (Gabapentin/)      HPI     Metformin: Patient needs the metformin prescription resent with new sig. The sig is a little confusing. Patient states she knows it works better when taken with dinner. Patient states she doesn't eat a huge dinner so she is wondering about taking it at a different meal?  Diarrhea at first that resolved.      Rash  Onset/Duration: ongoing. The cream that was prescribed didn't really help. Patient still having a rash.  Description  Location: Groin area.  Character: blotchy, red, raw  Itching: moderate- intermittent   Intensity:  moderate  Progression of Symptoms:  intermittent  Accompanying signs " and symptoms:   Fever: No  Body aches or joint pain: No  Sore throat symptoms: No  Recent cold symptoms: No  History:           Previous episodes of similar rash: Yes- has been dealing with this for quite some time.  New exposures:  None  Recent travel: No  Exposure to similar rash: No  Precipitating or alleviating factors: none  Therapies tried and outcome: bacitracin- helps a little bit.    Walking quite a bit 5000 steps a day  Healthy eating, no more sweets.      Review of Systems       Objective    BP (!) 88/58   Pulse 76   Temp 98.6  F (37  C) (Tympanic)   Resp 20   Wt 90 kg (198 lb 6.4 oz)   SpO2 97%   BMI 31.54 kg/m    Body mass index is 31.54 kg/m .  Physical Exam   GENERAL: healthy, alert and no distress  RESP: lungs clear to auscultation - no rales, rhonchi or wheezes  CV: regular rate and rhythm, normal S1 S2, no S3 or S4, no murmur, click or rub, no peripheral edema and peripheral pulses strong  SKIN: abdominal pannus fold, pink in the fold there is cracking skin and mild pink from rubbing. Not hot, not red, no drainage.          Wt Readings from Last 4 Encounters:   08/02/22 90 kg (198 lb 6.4 oz)   02/08/22 89.8 kg (198 lb)   10/25/21 85.3 kg (188 lb)   10/19/21 85.3 kg (188 lb)           .  ..  DME (Durable Medical Equipment) Orders and Documentation  Orders Placed This Encounter   Procedures     Miscellaneous DME Order      The patient was assessed and it was determined the patient is in need of the following listed DME Supplies/Equipment. Please complete supporting documentation below to demonstrate medical necessity.      DME All Other Item(s) Documentation    List reason for need and supporting documentation for medical necessity below for each DME item.     1. Dermatitis in skin fold

## 2022-08-04 DIAGNOSIS — Z79.899 HIGH RISK MEDICATION USE: ICD-10-CM

## 2022-08-04 DIAGNOSIS — L40.9 PSORIASIS: ICD-10-CM

## 2022-08-04 DIAGNOSIS — L40.50 PSORIATIC ARTHRITIS (H): ICD-10-CM

## 2022-08-04 RX ORDER — FOLIC ACID 1 MG/1
1 TABLET ORAL DAILY
Qty: 90 TABLET | Refills: 0 | Status: SHIPPED | OUTPATIENT
Start: 2022-08-04 | End: 2022-09-20

## 2022-08-05 RX ORDER — MOMETASONE FUROATE 1 MG/ML
SOLUTION TOPICAL DAILY
Qty: 60 ML | Refills: 0 | Status: SHIPPED | OUTPATIENT
Start: 2022-08-05 | End: 2022-08-30

## 2022-08-15 ENCOUNTER — TELEPHONE (OUTPATIENT)
Dept: MULTI SPECIALTY CLINIC | Facility: CLINIC | Age: 42
End: 2022-08-15

## 2022-08-15 DIAGNOSIS — L40.50 PSORIATIC ARTHRITIS (H): Primary | ICD-10-CM

## 2022-08-15 NOTE — TELEPHONE ENCOUNTER
[facility] Call [facility]    Phone Message    May a detailed message be left on voicemail: yes     Reason for Call: Order(s): Other:   Reason for requested: standing lab orders  Date needed: ASAP  Provider name: Dr. Ingram    Please update standing lab orders.      Action Taken: Other: [facility]    Travel Screening: Not Applicable

## 2022-08-30 DIAGNOSIS — L40.9 PSORIASIS: ICD-10-CM

## 2022-08-30 RX ORDER — MOMETASONE FUROATE 1 MG/ML
SOLUTION TOPICAL DAILY
Qty: 60 ML | Refills: 0 | Status: SHIPPED | OUTPATIENT
Start: 2022-08-30 | End: 2022-09-20

## 2022-09-07 ENCOUNTER — MYC MEDICAL ADVICE (OUTPATIENT)
Dept: RHEUMATOLOGY | Facility: CLINIC | Age: 42
End: 2022-09-07

## 2022-09-18 DIAGNOSIS — L40.9 PSORIASIS: ICD-10-CM

## 2022-09-18 DIAGNOSIS — L40.50 PSORIATIC ARTHRITIS (H): ICD-10-CM

## 2022-09-19 NOTE — TELEPHONE ENCOUNTER
Patient is unable to do labs until Oct.  Can you approve methotrexate for 1 month to get her by? Please reply to the patient via MyChart. Thanks!

## 2022-09-20 ENCOUNTER — MYC REFILL (OUTPATIENT)
Dept: FAMILY MEDICINE | Facility: CLINIC | Age: 42
End: 2022-09-20

## 2022-09-20 ENCOUNTER — MYC REFILL (OUTPATIENT)
Dept: RHEUMATOLOGY | Facility: CLINIC | Age: 42
End: 2022-09-20

## 2022-09-20 DIAGNOSIS — L40.9 PSORIASIS: ICD-10-CM

## 2022-09-20 DIAGNOSIS — L40.50 PSORIATIC ARTHRITIS (H): ICD-10-CM

## 2022-09-20 DIAGNOSIS — Z79.899 HIGH RISK MEDICATION USE: ICD-10-CM

## 2022-09-20 DIAGNOSIS — F31.9 BIPOLAR 1 DISORDER (H): Primary | ICD-10-CM

## 2022-09-20 RX ORDER — FOLIC ACID 1 MG/1
1 TABLET ORAL DAILY
Qty: 90 TABLET | Refills: 0 | Status: SHIPPED | OUTPATIENT
Start: 2022-09-20 | End: 2022-09-23

## 2022-09-20 NOTE — TELEPHONE ENCOUNTER
One month supply sent to pharmacy per RN refill protocol. Pt will need labs in October for future refills per Dr Ingram.

## 2022-09-23 RX ORDER — FOLIC ACID 1 MG/1
1 TABLET ORAL DAILY
Qty: 90 TABLET | Refills: 1 | Status: SHIPPED | OUTPATIENT
Start: 2022-09-23 | End: 2023-01-18

## 2022-09-23 RX ORDER — MOMETASONE FUROATE 1 MG/ML
SOLUTION TOPICAL DAILY
Qty: 60 ML | Refills: 0 | Status: SHIPPED | OUTPATIENT
Start: 2022-09-23 | End: 2022-09-27

## 2022-09-23 RX ORDER — PROPRANOLOL HYDROCHLORIDE 80 MG/1
80 CAPSULE, EXTENDED RELEASE ORAL DAILY
Qty: 90 CAPSULE | Refills: 1 | Status: SHIPPED | OUTPATIENT
Start: 2022-09-23 | End: 2023-01-18

## 2022-09-27 RX ORDER — MOMETASONE FUROATE 1 MG/ML
SOLUTION TOPICAL DAILY PRN
Qty: 60 ML | Refills: 0 | Status: SHIPPED | OUTPATIENT
Start: 2022-09-27 | End: 2023-10-17

## 2022-10-18 ENCOUNTER — LAB (OUTPATIENT)
Dept: LAB | Facility: CLINIC | Age: 42
End: 2022-10-18
Payer: COMMERCIAL

## 2022-10-18 DIAGNOSIS — L40.50 PSORIATIC ARTHRITIS (H): ICD-10-CM

## 2022-10-18 DIAGNOSIS — Z11.4 SCREENING FOR HIV (HUMAN IMMUNODEFICIENCY VIRUS): Primary | ICD-10-CM

## 2022-10-18 LAB
ALBUMIN SERPL BCG-MCNC: 4.2 G/DL (ref 3.5–5.2)
ALT SERPL W P-5'-P-CCNC: 23 U/L (ref 10–35)
CREAT SERPL-MCNC: 0.68 MG/DL (ref 0.51–0.95)
ERYTHROCYTE [DISTWIDTH] IN BLOOD BY AUTOMATED COUNT: 14 % (ref 10–15)
GFR SERPL CREATININE-BSD FRML MDRD: >90 ML/MIN/1.73M2
HCT VFR BLD AUTO: 39.2 % (ref 35–47)
HGB BLD-MCNC: 12.7 G/DL (ref 11.7–15.7)
HIV 1+2 AB+HIV1 P24 AG SERPL QL IA: NONREACTIVE
MCH RBC QN AUTO: 27.8 PG (ref 26.5–33)
MCHC RBC AUTO-ENTMCNC: 32.4 G/DL (ref 31.5–36.5)
MCV RBC AUTO: 86 FL (ref 78–100)
PLATELET # BLD AUTO: 323 10E3/UL (ref 150–450)
RBC # BLD AUTO: 4.57 10E6/UL (ref 3.8–5.2)
WBC # BLD AUTO: 11 10E3/UL (ref 4–11)

## 2022-10-18 PROCEDURE — 85027 COMPLETE CBC AUTOMATED: CPT

## 2022-10-18 PROCEDURE — 82565 ASSAY OF CREATININE: CPT

## 2022-10-18 PROCEDURE — 84460 ALANINE AMINO (ALT) (SGPT): CPT

## 2022-10-18 PROCEDURE — 82040 ASSAY OF SERUM ALBUMIN: CPT

## 2022-10-18 PROCEDURE — 87389 HIV-1 AG W/HIV-1&-2 AB AG IA: CPT

## 2022-10-18 PROCEDURE — 36415 COLL VENOUS BLD VENIPUNCTURE: CPT

## 2022-10-28 DIAGNOSIS — L40.9 PSORIASIS: ICD-10-CM

## 2022-10-28 DIAGNOSIS — L40.50 PSORIATIC ARTHRITIS (H): ICD-10-CM

## 2022-11-08 ENCOUNTER — TELEPHONE (OUTPATIENT)
Dept: FAMILY MEDICINE | Facility: CLINIC | Age: 42
End: 2022-11-08

## 2022-11-08 DIAGNOSIS — H90.11 CONDUCTIVE HEARING LOSS OF RIGHT EAR WITH UNRESTRICTED HEARING OF LEFT EAR: Primary | ICD-10-CM

## 2022-11-08 NOTE — TELEPHONE ENCOUNTER
Order/Referral Request    Who is requesting: Dianne, staff for patient    Orders being requested: Need referral to Audiology in [facility] for annual hearing exam    Reason service is needed/diagnosis: Annual hearing exam    When are orders needed by: asap    Has this been discussed with Provider: Yes    Does patient have a preference on a Group/Provider/Facility? [facility]    Does patient have an appointment scheduled?: Yes: *    Where to send orders: In chart    Could we send this information to you in [facility] or would you prefer to receive a phone call?:   Patient would prefer a phone call   Okay to leave a detailed message?: Yes at Home number on file [phone] (home)

## 2022-11-09 PROBLEM — H90.11 CONDUCTIVE HEARING LOSS OF RIGHT EAR WITH UNRESTRICTED HEARING OF LEFT EAR: Status: ACTIVE | Noted: 2022-11-09

## 2022-12-19 ENCOUNTER — MYC MEDICAL ADVICE (OUTPATIENT)
Dept: FAMILY MEDICINE | Facility: CLINIC | Age: 42
End: 2022-12-19

## 2022-12-26 ENCOUNTER — MYC MEDICAL ADVICE (OUTPATIENT)
Dept: FAMILY MEDICINE | Facility: CLINIC | Age: 42
End: 2022-12-26

## 2022-12-27 ENCOUNTER — VIRTUAL VISIT (OUTPATIENT)
Dept: URGENT CARE | Facility: CLINIC | Age: 42
End: 2022-12-27
Payer: COMMERCIAL

## 2022-12-27 DIAGNOSIS — U07.1 COVID: Primary | ICD-10-CM

## 2022-12-27 PROCEDURE — 99213 OFFICE O/P EST LOW 20 MIN: CPT | Mod: CS | Performed by: EMERGENCY MEDICINE

## 2022-12-27 NOTE — LETTER
[facility]  [address] [address]  Phone: [phone]    December 27, 2022        Tabitha ELIZABETH Payton  [address] [facility] [address]          To whom it may concern:    RE: Tabitha Payton    Ms. Sarah has contracted COVID infection.  Her past medical history indicates that she has risk for progressing with this infection and will be placed on paxlovid.  She is to take paxlovid twice daily for 5 days per protocol instructed by pharmacist.    Please contact me for questions or concerns.      Sincerely,        Dewayne Agudelo MD

## 2022-12-27 NOTE — PROGRESS NOTES
"Video visit   Start time: 8:30 AM   Stop time: 8:37 AM   Duration: 7 minutes   Patient location: Group home with caregiver   Provider location:  [employer] [employer] virtual provider (remote)   Platform used for video visit: [facility]       the patient has been notified of following:     \"This video visit will be conducted via a call between you and your physician/provider. We have found that certain health care needs can be provided without the need for a physical exam.  This service lets us provide the care you need with a short phone conversation.  If a prescription is necessary we can send it directly to your pharmacy.  If lab work is needed we can place an order for that and you can then stop by our lab to have the test done at a later time.    Video visits are billed at different rates depending on your insurance coverage. During this emergency period, for some insurers they may be billed the same as an in-person visit.  Please reach out to your insurance provider with any questions.    If during the course of the call the physician/provider feels a video visit is not appropriate, you will not be charged for this service.\"    Patient has given verbal consent for Telephone visit?  Yes    What phone number would you like to be contacted at?  [phone]    How would you like to obtain your AVS? MyChart    Subjective   CC: Tabitha Payton  is a 42 year old female who presents via phone visit today for the following health issues:   Chief Complaint   Patient presents with     Infection        COVID-19 Symptom Review  How many days ago did these symptoms start? 5    Are any of the following symptoms significant for you?    New or worsening difficulty breathing? No    Worsening cough? Yes, it's a dry cough.     Fever or chills? No    Headache: No    Sore throat: YES    Chest pain: No    Diarrhea: No    Body aches? No    What treatments has patient tried?    Does patient live in a nursing home, group home, or shelter? " YES  Does patient have a way to get food/medications during quarantined? Yes, I have a friend or family member who can help me. and Yes                       Reviewed and updated as needed this visit by Provider                  Review of Systems         Objective    Gen: Patient is alert, oriented  Gen: No acute distress on appointment.  Nondyspneic sounding speaking in full sentences.              Assessment/Plan:  Patient is a 42-year-old female group home resident who is on day 5 of COVID infection.  She has a residual cough.  She has had some loose stools.  She is also has some sore throat but feels her symptoms are much better.  Patient has past medical illness of elevated BMI of 31, psoriatic arthritis and bipolar illness.  She consents to taking paxlovid.  GFR is greater than 90.  She is not to take her statin for the 5 days of treatment.         Dewayne Agudelo MD

## 2022-12-27 NOTE — LETTER
[facility]  [address] [address]  Phone: [phone]    December 27, 2022        Tabitha Payton  [address] [facility] [address]          To whom it may concern:    RE: Tabitha Payton    Patient has contracted COVID infection.  She is on day 5 of symptoms.  Due to her past medical history she is a candidate to be treated with paxlovid.  The paxlovid is taken twice daily for 5 days per instruction by pharmacist.  She is to withhold her simvastatin for the 5 days of treatment and then resume after the 5 days of completion of the paxlovid medication.    Please contact me for questions or concerns.      Sincerely,        Dewayne Agudelo MD

## 2022-12-29 ENCOUNTER — TELEPHONE (OUTPATIENT)
Dept: FAMILY MEDICINE | Facility: CLINIC | Age: 42
End: 2022-12-29

## 2022-12-30 NOTE — TELEPHONE ENCOUNTER
[facility] ICD 10 request received, printed, and faxed to [phone]. Copy placed in cabinet.

## 2023-01-11 ENCOUNTER — TELEPHONE (OUTPATIENT)
Dept: RHEUMATOLOGY | Facility: CLINIC | Age: 43
End: 2023-01-11
Payer: COMMERCIAL

## 2023-01-11 DIAGNOSIS — Z00.00 ROUTINE GENERAL MEDICAL EXAMINATION AT A HEALTH CARE FACILITY: ICD-10-CM

## 2023-01-11 DIAGNOSIS — L40.50 PSORIATIC ARTHRITIS (H): ICD-10-CM

## 2023-01-11 DIAGNOSIS — L40.9 PSORIASIS: ICD-10-CM

## 2023-01-11 RX ORDER — DROSPIRENONE AND ETHINYL ESTRADIOL TABLETS 0.02-3(28)
KIT ORAL
Qty: 28 TABLET | Refills: 1 | Status: SHIPPED | OUTPATIENT
Start: 2023-01-11 | End: 2023-03-08

## 2023-01-18 ENCOUNTER — MEDICAL CORRESPONDENCE (OUTPATIENT)
Dept: HEALTH INFORMATION MANAGEMENT | Facility: CLINIC | Age: 43
End: 2023-01-18

## 2023-01-18 ENCOUNTER — OFFICE VISIT (OUTPATIENT)
Dept: FAMILY MEDICINE | Facility: CLINIC | Age: 43
End: 2023-01-18
Payer: COMMERCIAL

## 2023-01-18 ENCOUNTER — MYC MEDICAL ADVICE (OUTPATIENT)
Dept: SCHEDULING | Facility: CLINIC | Age: 43
End: 2023-01-18

## 2023-01-18 VITALS
TEMPERATURE: 97 F | OXYGEN SATURATION: 97 % | DIASTOLIC BLOOD PRESSURE: 62 MMHG | SYSTOLIC BLOOD PRESSURE: 90 MMHG | HEART RATE: 74 BPM | RESPIRATION RATE: 16 BRPM | BODY MASS INDEX: 32.59 KG/M2 | WEIGHT: 202.8 LBS | HEIGHT: 66 IN

## 2023-01-18 DIAGNOSIS — E66.811 CLASS 1 OBESITY DUE TO EXCESS CALORIES WITHOUT SERIOUS COMORBIDITY WITH BODY MASS INDEX (BMI) OF 33.0 TO 33.9 IN ADULT: ICD-10-CM

## 2023-01-18 DIAGNOSIS — F31.9 BIPOLAR 1 DISORDER (H): ICD-10-CM

## 2023-01-18 DIAGNOSIS — E66.09 CLASS 1 OBESITY DUE TO EXCESS CALORIES WITHOUT SERIOUS COMORBIDITY WITH BODY MASS INDEX (BMI) OF 33.0 TO 33.9 IN ADULT: ICD-10-CM

## 2023-01-18 DIAGNOSIS — Z79.899 HIGH RISK MEDICATION USE: ICD-10-CM

## 2023-01-18 DIAGNOSIS — J30.2 SEASONAL ALLERGIC RHINITIS, UNSPECIFIED TRIGGER: ICD-10-CM

## 2023-01-18 DIAGNOSIS — Z13.220 SCREENING CHOLESTEROL LEVEL: ICD-10-CM

## 2023-01-18 DIAGNOSIS — L40.50 PSORIATIC ARTHRITIS (H): ICD-10-CM

## 2023-01-18 DIAGNOSIS — L40.9 PSORIASIS: ICD-10-CM

## 2023-01-18 DIAGNOSIS — Z00.00 ROUTINE GENERAL MEDICAL EXAMINATION AT A HEALTH CARE FACILITY: Primary | ICD-10-CM

## 2023-01-18 DIAGNOSIS — R73.03 PRE-DIABETES: ICD-10-CM

## 2023-01-18 LAB
ALBUMIN SERPL BCG-MCNC: 4.1 G/DL (ref 3.5–5.2)
ALT SERPL W P-5'-P-CCNC: 17 U/L (ref 10–35)
CHOLEST SERPL-MCNC: 171 MG/DL
CREAT SERPL-MCNC: 0.86 MG/DL (ref 0.51–0.95)
ERYTHROCYTE [DISTWIDTH] IN BLOOD BY AUTOMATED COUNT: 14.4 % (ref 10–15)
FASTING STATUS PATIENT QL REPORTED: YES
GFR SERPL CREATININE-BSD FRML MDRD: 85 ML/MIN/1.73M2
GLUCOSE SERPL-MCNC: 101 MG/DL (ref 70–99)
HBA1C MFR BLD: 5.4 % (ref 0–5.6)
HCT VFR BLD AUTO: 36.4 % (ref 35–47)
HDLC SERPL-MCNC: 66 MG/DL
HGB BLD-MCNC: 12.3 G/DL (ref 11.7–15.7)
LDLC SERPL CALC-MCNC: 32 MG/DL
MCH RBC QN AUTO: 27.5 PG (ref 26.5–33)
MCHC RBC AUTO-ENTMCNC: 33.8 G/DL (ref 31.5–36.5)
MCV RBC AUTO: 81 FL (ref 78–100)
NONHDLC SERPL-MCNC: 105 MG/DL
PLATELET # BLD AUTO: 326 10E3/UL (ref 150–450)
RBC # BLD AUTO: 4.48 10E6/UL (ref 3.8–5.2)
TRIGL SERPL-MCNC: 364 MG/DL
WBC # BLD AUTO: 9.8 10E3/UL (ref 4–11)

## 2023-01-18 PROCEDURE — 82040 ASSAY OF SERUM ALBUMIN: CPT | Performed by: FAMILY MEDICINE

## 2023-01-18 PROCEDURE — 99396 PREV VISIT EST AGE 40-64: CPT | Performed by: FAMILY MEDICINE

## 2023-01-18 PROCEDURE — 85027 COMPLETE CBC AUTOMATED: CPT | Performed by: FAMILY MEDICINE

## 2023-01-18 PROCEDURE — 99214 OFFICE O/P EST MOD 30 MIN: CPT | Mod: 25 | Performed by: FAMILY MEDICINE

## 2023-01-18 PROCEDURE — 80061 LIPID PANEL: CPT | Performed by: FAMILY MEDICINE

## 2023-01-18 PROCEDURE — 91313 COVID-19 VACCINE BIVALENT BOOSTER 18+ (MODERNA): CPT | Performed by: FAMILY MEDICINE

## 2023-01-18 PROCEDURE — 83036 HEMOGLOBIN GLYCOSYLATED A1C: CPT | Performed by: FAMILY MEDICINE

## 2023-01-18 PROCEDURE — 0134A COVID-19 VACCINE BIVALENT BOOSTER 18+ (MODERNA): CPT | Performed by: FAMILY MEDICINE

## 2023-01-18 PROCEDURE — 36415 COLL VENOUS BLD VENIPUNCTURE: CPT | Performed by: FAMILY MEDICINE

## 2023-01-18 PROCEDURE — 82565 ASSAY OF CREATININE: CPT | Performed by: FAMILY MEDICINE

## 2023-01-18 PROCEDURE — 82947 ASSAY GLUCOSE BLOOD QUANT: CPT | Performed by: FAMILY MEDICINE

## 2023-01-18 PROCEDURE — 84460 ALANINE AMINO (ALT) (SGPT): CPT | Performed by: FAMILY MEDICINE

## 2023-01-18 RX ORDER — METFORMIN HCL 500 MG
1500 TABLET, EXTENDED RELEASE 24 HR ORAL
Qty: 270 TABLET | Refills: 0
Start: 2023-01-18 | End: 2023-02-01

## 2023-01-18 RX ORDER — FOLIC ACID 1 MG/1
1 TABLET ORAL DAILY
Qty: 90 TABLET | Refills: 3 | Status: SHIPPED | OUTPATIENT
Start: 2023-01-18 | End: 2023-10-04

## 2023-01-18 RX ORDER — PROPRANOLOL HYDROCHLORIDE 80 MG/1
80 CAPSULE, EXTENDED RELEASE ORAL DAILY
Qty: 90 CAPSULE | Refills: 1 | Status: SHIPPED | OUTPATIENT
Start: 2023-01-18

## 2023-01-18 RX ORDER — CETIRIZINE HYDROCHLORIDE 10 MG/1
10 TABLET ORAL DAILY
Qty: 90 TABLET | Refills: 3 | Status: SHIPPED | OUTPATIENT
Start: 2023-01-18 | End: 2023-12-18

## 2023-01-18 ASSESSMENT — ENCOUNTER SYMPTOMS
SORE THROAT: 0
WEAKNESS: 0
DIZZINESS: 0
HEARTBURN: 0
BREAST MASS: 0
EYE PAIN: 0
DIARRHEA: 0
JOINT SWELLING: 0
HEADACHES: 0
FREQUENCY: 0
NERVOUS/ANXIOUS: 0
MYALGIAS: 0
PARESTHESIAS: 0
NAUSEA: 0
HEMATURIA: 0
PALPITATIONS: 0
ABDOMINAL PAIN: 0
COUGH: 0
DYSURIA: 0
CONSTIPATION: 0
ARTHRALGIAS: 0
SHORTNESS OF BREATH: 0
FEVER: 0
HEMATOCHEZIA: 0
CHILLS: 0

## 2023-01-18 ASSESSMENT — PAIN SCALES - GENERAL: PAINLEVEL: NO PAIN (0)

## 2023-01-18 NOTE — PATIENT INSTRUCTIONS
Schedule appt in 12 weeks  To lab  Methotrexate refills need to come from Rheumatology.    This is a preventative visit and any additional concerns or chronic disease management including medication refills addressed today could be charged additionally.    Preventative visits screen for diseases prior to they occur.  They do not cover for any new diagnosis or chronic disease management.     If you have questions regarding your coverage please check with your insurance provider.  At [place] we need to code correctly to be in compliance with all insurance companies.      Preventive Health Recommendations  Female Ages 40 to 49    Yearly exam:   See your health care provider every year in order to  Review health changes.   Discuss preventive care.    Review your medicines if your doctor prescribed any.    Get a Pap test every three years (unless you have an abnormal result and your provider advises testing more often).    If you get Pap tests with HPV test, you only need to test every 5 years, unless you have an abnormal result. You do not need a Pap test if your uterus was removed (hysterectomy) and you have not had cancer.    You should be tested each year for STDs (sexually transmitted diseases), if you're at risk.   Ask your doctor if you should have a mammogram.    Have a colonoscopy (test for colon cancer) if someone in your family has had colon cancer or polyps before age 50.     Have a cholesterol test every 5 years.     Have a diabetes test (fasting glucose) after age 45. If you are at risk for diabetes, you should have this test every 3 years.    Shots: Get a flu shot each year. Get a tetanus shot every 10 years.     Nutrition:   Eat at least 5 servings of fruits and vegetables each day.  Eat whole-grain bread, whole-wheat pasta and brown rice instead of white grains and rice.  Get adequate Calcium and Vitamin D.      Lifestyle  Exercise at least 150 minutes a week (an average of 30 minutes a day, 5 days a  week). This will help you control your weight and prevent disease.  Limit alcohol to one drink per day.  No smoking.   Wear sunscreen to prevent skin cancer.  See your dentist every six months for an exam and cleaning.

## 2023-01-18 NOTE — PROGRESS NOTES
SUBJECTIVE:   CC: Adina is an 43 year old who presents for preventive health visit.   Patient has been advised of split billing requirements and indicates understanding: Yes  Healthy Habits:     Getting at least 3 servings of Calcium per day:  Yes    Bi-annual eye exam:  Yes    Dental care twice a year:  NO    Sleep apnea or symptoms of sleep apnea:  Sleep apnea    Diet:  Other    Frequency of exercise:  2-3 days/week    Duration of exercise:  15-30 minutes    Taking medications regularly:  No    Barriers to taking medications:  Problems remembering to take them    Medication side effects:  Other    PHQ-2 Total Score: 0    Additional concerns today:  No          Medication Followup of Psoriasis     Taking Medication as prescribed: yes    Side Effects:  None    Medication Helping Symptoms:  yes    Pre-Diabetes Follow-up  Currently on metformin and topiramate but still gaining weight with the zyprexa.    How often are you checking your blood sugar? Not at all    What concerns do you have today about your diabetes? Other: Discuss Ozempic.     Do you have any of these symptoms? (Select all that apply)  Excessive thirst and Weight gain  Walking daily  Doing portion control      BP Readings from Last 2 Encounters:   01/18/23 90/62   08/02/22 (!) 88/58     Hemoglobin A1C (%)   Date Value   02/16/2022 5.4   03/29/2021 5.2     LDL Cholesterol Calculated (mg/dL)   Date Value   02/16/2022 48   03/29/2021 69       Depression Followup    How are you doing with your depression since your last visit? Stable    Are you having other symptoms that might be associated with depression? No    Have you had a significant life event?  No     Are you feeling anxious or having panic attacks?   No    Do you have any concerns with your use of alcohol or other drugs? No    Social History     Tobacco Use     Smoking status: Never     Smokeless tobacco: Never   Vaping Use     Vaping Use: Never used   Substance Use Topics     Alcohol use:  Never     Drug use: Never     No flowsheet data found.  No flowsheet data found.      Suicide Assessment Five-step Evaluation and Treatment (SAFE-T)      Today's PHQ-2 Score:   PHQ-2 ( 1999 Pfizer) 2/8/2022   Q1: Little interest or pleasure in doing things 1   Q2: Feeling down, depressed or hopeless 1   PHQ-2 Score 2   PHQ-2 Total Score (12-17 Years)- Positive if 3 or more points; Administer PHQ-A if positive -           Social History     Tobacco Use     Smoking status: Never     Smokeless tobacco: Never   Substance Use Topics     Alcohol use: Never     If you drink alcohol do you typically have >3 drinks per day or >7 drinks per week? Not applicable    No flowsheet data found.    Reviewed orders with patient.  Reviewed health maintenance and updated orders accordingly - Yes  BP Readings from Last 3 Encounters:   01/18/23 90/62   08/02/22 (!) 88/58   02/08/22 112/74    Wt Readings from Last 3 Encounters:   01/18/23 92 kg (202 lb 12.8 oz)   08/02/22 90 kg (198 lb 6.4 oz)   02/08/22 89.8 kg (198 lb)                    Breast Cancer Screening:    FHS-7:   Breast CA Risk Assessment (FHS-7) 8/9/2021   Did any of your first-degree relatives have breast or ovarian cancer? Unknown   Did any of your relatives have bilateral breast cancer? Unknown   Did any man in your family have breast cancer? Unknown   Did any woman in your family have breast and ovarian cancer? Unknown   Did any woman in your family have breast cancer before age 50 y? Unknown   Do you have 2 or more relatives with breast and/or ovarian cancer? Unknown   Do you have 2 or more relatives with breast and/or bowel cancer? Unknown       Mammogram Screening - Offered annual screening and updated Health Maintenance for mutual plan based on risk factor consideration    Pertinent mammograms are reviewed under the imaging tab.    History of abnormal Pap smear: patient declines due to mental health and history     Reviewed and updated as needed this visit by  "clinical staff   Tobacco  Allergies  Meds              Reviewed and updated as needed this visit by Provider                     Review of Systems   Constitutional: Negative for chills and fever.   HENT: Negative for congestion, ear pain, hearing loss and sore throat.    Eyes: Negative for pain and visual disturbance.   Respiratory: Negative for cough and shortness of breath.    Cardiovascular: Negative for chest pain, palpitations and peripheral edema.   Gastrointestinal: Negative for abdominal pain, constipation, diarrhea, heartburn, hematochezia and nausea.   Breasts:  Negative for tenderness, breast mass and discharge.   Genitourinary: Negative for dysuria, frequency, genital sores, hematuria, pelvic pain, urgency, vaginal bleeding and vaginal discharge.   Musculoskeletal: Negative for arthralgias, joint swelling and myalgias.   Skin: Positive for rash.   Neurological: Negative for dizziness, weakness, headaches and paresthesias.   Psychiatric/Behavioral: Negative for mood changes. The patient is not nervous/anxious.           OBJECTIVE:   BP 90/62 (BP Location: Right arm, Patient Position: Sitting, Cuff Size: Adult Large)   Pulse 74   Temp 97  F (36.1  C) (Tympanic)   Resp 16   Ht 1.664 m (5' 5.5\")   Wt 92 kg (202 lb 12.8 oz)   SpO2 97%   BMI 33.23 kg/m    Physical Exam  GENERAL: healthy, alert and no distress  EYES: Eyes grossly normal to inspection, PERRL and conjunctivae and sclerae normal  HENT: ear canals and TM's normal, nose and mouth without ulcers or lesions  NECK: no adenopathy, no asymmetry, masses, or scars and thyroid normal to palpation  RESP: lungs clear to auscultation - no rales, rhonchi or wheezes  BREAST: normal without masses, tenderness or nipple discharge and no palpable axillary masses or adenopathy  CV: regular rate and rhythm, normal S1 S2, no S3 or S4, no murmur, click or rub, no peripheral edema and peripheral pulses strong  ABDOMEN: soft, nontender, no hepatosplenomegaly, " no masses and bowel sounds normal  MS: no gross musculoskeletal defects noted, no edema  SKIN: no suspicious lesions or rashes  NEURO: Normal strength and tone, mentation intact and speech normal  PSYCH: mentation appears normal, affect normal/bright    Diagnostic Test Results:  Labs reviewed in Epic    ASSESSMENT/PLAN:   Tabitha was seen today for physical and health maintenance.    Diagnoses and all orders for this visit:    Routine general medical examination at a health care facility    Seasonal allergic rhinitis, unspecified trigger  -     cetirizine (ZYRTEC) 10 MG tablet; Take 1 tablet (10 mg) by mouth daily    High risk medication use  -     folic acid (FOLVITE) 1 MG tablet; Take 1 tablet (1 mg) by mouth daily  -     Hemoglobin A1c; Future  -     Lipid panel reflex to direct LDL Non-fasting; Future  -     Hemoglobin A1c  -     Lipid panel reflex to direct LDL Non-fasting    Psoriasis    Psoriatic arthritis (H): following with Rheum   Known issue that I take into account for their medical decisions, no current exacerbations or new concerns    -     ALT  -     Creatinine  -     CBC with platelets  -     Albumin level    Bipolar 1 disorder (H): following with psychiatry   Known issue that I take into account for their medical decisions, no current exacerbations or new concerns    -     propranolol ER (INDERAL LA) 80 MG 24 hr capsule; Take 1 capsule (80 mg) by mouth daily    Class 1 obesity due to excess calories without serious comorbidity with body mass index (BMI) of 33.0 to 33.9 in adult: discussed  Plan to stop metformin and switch to ozempic (if covered by insurance).  -     semaglutide (OZEMPIC) 2 MG/1.5ML SOPN pen; Inject 0.25 mg Subcutaneous every 7 days for 28 days (abdomen or thigh). Plus or minus 1 day.  -     semaglutide (OZEMPIC) 2 MG/1.5ML SOPN pen; Inject 0.5 mg Subcutaneous every 7 days for 8 doses (abdomen or thigh). Plus or minus 1 day.    Pre-diabetes: recheck today.  -     metFORMIN  (GLUCOPHAGE XR) 500 MG 24 hr tablet; Take 3 tablets (1,500 mg) by mouth daily with food Lunch. Stop date 1/30/23.  -     Hemoglobin A1c; Future  -     Glucose; Future  -     Hemoglobin A1c  -     Glucose    Screening cholesterol level  -     Lipid panel reflex to direct LDL Non-fasting; Future  -     Lipid panel reflex to direct LDL Non-fasting    Other orders  -     COVID-19,PF,MODERNA BIVALENT (18+YRS)        Patient has been advised of split billing requirements and indicates understanding: Yes      COUNSELING:  Reviewed preventive health counseling, as reflected in patient instructions       Regular exercise       Healthy diet/nutrition       Vision screening        She reports that she has never smoked. She has never used smokeless tobacco.      Nicolas Loaiza MD  [facility]

## 2023-01-18 NOTE — TELEPHONE ENCOUNTER
"2nd attempt  Contacted primary/preferred phone number listed and a women by the name of \"Dianne\" answered and states she is a staff member of Pt's home.    No consent to communicate on file.  Will send a [employer] message to the Pt.  "

## 2023-01-20 ENCOUNTER — MYC MEDICAL ADVICE (OUTPATIENT)
Dept: FAMILY MEDICINE | Facility: CLINIC | Age: 43
End: 2023-01-20
Payer: COMMERCIAL

## 2023-01-20 DIAGNOSIS — R73.03 PRE-DIABETES: ICD-10-CM

## 2023-01-20 NOTE — TELEPHONE ENCOUNTER
Pt responded back via [employer] stating labs were completed on 1/18/23.    Please review and advise for refill.

## 2023-01-23 ENCOUNTER — TELEPHONE (OUTPATIENT)
Dept: FAMILY MEDICINE | Facility: CLINIC | Age: 43
End: 2023-01-23
Payer: COMMERCIAL

## 2023-01-23 DIAGNOSIS — E66.811 CLASS 1 OBESITY DUE TO EXCESS CALORIES WITHOUT SERIOUS COMORBIDITY WITH BODY MASS INDEX (BMI) OF 31.0 TO 31.9 IN ADULT: Primary | ICD-10-CM

## 2023-01-23 DIAGNOSIS — E66.09 CLASS 1 OBESITY DUE TO EXCESS CALORIES WITHOUT SERIOUS COMORBIDITY WITH BODY MASS INDEX (BMI) OF 31.0 TO 31.9 IN ADULT: Primary | ICD-10-CM

## 2023-01-24 ENCOUNTER — OFFICE VISIT (OUTPATIENT)
Dept: AUDIOLOGY | Facility: CLINIC | Age: 43
End: 2023-01-24
Payer: COMMERCIAL

## 2023-01-24 DIAGNOSIS — H90.11 CONDUCTIVE HEARING LOSS OF RIGHT EAR WITH UNRESTRICTED HEARING OF LEFT EAR: ICD-10-CM

## 2023-01-24 PROCEDURE — 92557 COMPREHENSIVE HEARING TEST: CPT | Performed by: AUDIOLOGIST

## 2023-01-24 PROCEDURE — 92550 TYMPANOMETRY & REFLEX THRESH: CPT | Performed by: AUDIOLOGIST

## 2023-01-24 NOTE — TELEPHONE ENCOUNTER
Prior Authorization Retail Medication Request    Medication/Dose: ozempic  ICD code (if different than what is on RX):    Previously Tried and Failed:  Metformin;   Rationale:     Insurance Name:  Not provided  Insurance ID:  Not provided  [facility] Key: [id number]      Pharmacy Information (if different than what is on RX)  Name:    Phone:

## 2023-01-24 NOTE — PROGRESS NOTES
AUDIOLOGY REPORT    SUBJECTIVE:  Tabitha Payton is a 43 year old female who was seen in the Audiology Clinic at the [facility] for her annual group home audiologic evaluation, referred by Nicolas Loaiza M.D. .The patient has been seen previously in this clinic on 10/25/2021 for assessment and results indicated a conductive hearing loss in the right ear. The patient reports difficulty hearing in her right ear. The patient denies  bilateral tinnitus, bilateral otalgia and bilateral drainage. They were accompanied today by their group home attendant.    OBJECTIVE:    Otoscopic exam indicates ears are clear of cerumen bilaterally     Pure Tone Thresholds assessed using conventional audiometry with good  reliability from 250-8000 Hz bilaterally using insert earphones and circumaural headphones     RIGHT:  borderline-normal sloping to mild conductive hearing loss    LEFT:    normal hearing thresholds    Tympanogram:    RIGHT: hyper eardrum mobility (Type Ad)    LEFT:   hyper eardrum mobility (Type Ad)    Reflexes (reported by stimulus ear):  RIGHT: Ipsilateral is CNT  RIGHT: Contralateral is CNT  LEFT:   Ipsilateral is CNT  LEFT:   Contralateral is CNT  Note: TM too noisy for acoustic reflex measurements      Speech Reception Threshold:    RIGHT: 20 dB HL    LEFT:   10 dB HL  Word Recognition Score:     RIGHT: 92% at 60 dB HL using NU-6 recorded word list.    LEFT:   100% at 50 dB HL using NU-6 recorded word list.    DPOAE: 0328-8611 Hz    RIGHT: Emissions absent at all tested frequencies    LEFT: Emissions present at 4/6 tested frequencies      ASSESSMENT:     ICD-10-CM    1. Conductive hearing loss of right ear with unrestricted hearing of left ear  H90.11 Adult Audiology  Referral        Compared to patient's previous audiogram dated 10/25/2021, hearing has remained stable bilaterally. Today s results were discussed with the patient in detail.     PLAN:  Patient was counseled  regarding hearing loss and impact on communication. A copy of today's testing was given to group home staff. It is recommended that the patient return for annual hearing evaluaitons.  Please call this clinic with questions regarding these results or recommendations.        Jaida KIRAN-VIRGEN, #[id number]

## 2023-01-26 NOTE — TELEPHONE ENCOUNTER
PRIOR AUTHORIZATION DENIED    Medication: semaglutide (OZEMPIC) 2 MG/1.5ML SOPN pen - Denied    Denial Date: 1/26/2023    Denial Rational:         Appeal Information:

## 2023-01-26 NOTE — TELEPHONE ENCOUNTER
PA Initiation    Medication: semaglutide (OZEMPIC) 2 MG/1.5ML SOPN pen - Initiated  Insurance Company: [employer] - Phone [phone] Fax [phone]  Pharmacy Filling the Rx: [facility] [place] [facility] [address] [address]  Filling Pharmacy Phone: [phone]  Filling Pharmacy Fax: [phone]  Start Date: 1/26/2023

## 2023-01-27 NOTE — TELEPHONE ENCOUNTER
[place] for ozempic has been denied.      Medication is not FDA approved for coverage of Obesity.    Priscila Berry

## 2023-01-27 NOTE — TELEPHONE ENCOUNTER
I had chosen semaglutide, but must have been the ozempic.  I'll try the wegovy to see if this is covered.  Thank you,  Nicolas Loaiza MD

## 2023-01-31 NOTE — TELEPHONE ENCOUNTER
Dr. Josse,    Ozempic is in the PA process.  My chart message sent asking if you want her to continue with metformin. Jen ROBLES RN

## 2023-01-31 NOTE — TELEPHONE ENCOUNTER
I'll send the liraglutide.    Please notify patient that the Wegovy (semaglutide) is not covered and insurance requires we try Saxenda (liraglutide) which is a once a day injection. Start at low dose for 7 days then increase weekly until you get to a dose that doesn't give side effects and works for you.  If side effects of nausea or constipation/diarrhea that would get in the way of taking this medication then please decrease to the previous dose that did not cause side effects.     If this medication is not an option, let me know and we can go back to the other medication.  Thank you,  Nicolas Loaiza MD        ASSESSMENT/PLAN  Tabitha was seen today for prior auth - medication.    Diagnoses and all orders for this visit:    Class 1 obesity due to excess calories without serious comorbidity with body mass index (BMI) of 31.0 to 31.9 in adult  -     Semaglutide-Weight Management 0.25 MG/0.5ML SOAJ; Inject 0.25 mg Subcutaneous every 7 days  -     liraglutide - Weight Management (SAXENDA) 18 MG/3ML pen; Inject 0.6 mg Subcutaneous daily for 7 days, THEN 1.2 mg daily for 7 days, THEN 1.8 mg daily for 7 days, THEN 2.4 mg daily for 7 days, THEN 3 mg daily for 90 days.  -     insulin pen needle (31G X 5 MM) 31G X 5 MM miscellaneous; Use 1 pen needles daily with liraglutide.        Nicolas Loaiza MD  [facility]

## 2023-01-31 NOTE — TELEPHONE ENCOUNTER
Dr. Loaiza  PA is required for Wegovy as well.  Would you like to pursue PA?    Priscila Berry    FARHANA Harden: [id number]

## 2023-02-01 ENCOUNTER — TELEPHONE (OUTPATIENT)
Dept: FAMILY MEDICINE | Facility: CLINIC | Age: 43
End: 2023-02-01
Payer: COMMERCIAL

## 2023-02-01 RX ORDER — METFORMIN HCL 500 MG
1500 TABLET, EXTENDED RELEASE 24 HR ORAL
Qty: 270 TABLET | Refills: 3 | Status: SHIPPED | OUTPATIENT
Start: 2023-02-01 | End: 2023-02-07

## 2023-02-01 NOTE — TELEPHONE ENCOUNTER
Dr. Loaiza,  Liraglutide (Saxenda) also requires PATaurus Berry    [facility] Harden: [id number]

## 2023-02-02 NOTE — TELEPHONE ENCOUNTER
Prior Authorization Retail Medication Request    Medication/Dose: semaglutide 0.25 MG/0.5ML SOAJ  ICD code (if different than what is on RX):    Previously Tried and Failed:    Rationale:  ASSESSMENT/PLAN  Tabitha was seen today for prior auth - medication.     Diagnoses and all orders for this visit:     Class 1 obesity due to excess calories without serious comorbidity with body mass index (BMI) of 31.0 to 31.9 in adult  -     Semaglutide-Weight Management 0.25 MG/0.5ML SOAJ; Inject 0.25 mg Subcutaneous every 7 days  -     liraglutide - Weight Management (SAXENDA) 18 MG/3ML pen; Inject 0.6 mg Subcutaneous daily for 7 days, THEN 1.2 mg daily for 7 days, THEN 1.8 mg daily for 7 days, THEN 2.4 mg daily for 7 days, THEN 3 mg daily for 90 days.  -     insulin pen needle (31G X 5 MM) 31G X 5 MM miscellaneous; Use 1 pen needles daily with liraglutide.       Insurance Name:    Insurance ID:  [id number]       Pharmacy Information (if different than what is on RX)  Name:    Phone:

## 2023-02-05 NOTE — TELEPHONE ENCOUNTER
Central Prior Authorization Team   Phone: [phone]    PA Initiation    Medication: semaglutide 0.25 MG/0.5ML SOAJ  Insurance Company:    Pharmacy Filling the Rx: [facility] - [place] - [address] [address]  Filling Pharmacy Phone: [phone]  Filling Pharmacy Fax: [phone]  Start Date: 2/5/2023

## 2023-02-06 NOTE — TELEPHONE ENCOUNTER
Prior Authorization Approval    Authorization Effective Date: 1/6/2023  Authorization Expiration Date: 8/4/2023  Medication: semaglutide 0.25 MG/0.5ML SOAJ - APPROVED  Approved Dose/Quantity:    Reference #:     Insurance Company:    Expected CoPay:       CoPay Card Available:      Foundation Assistance Needed:    Which Pharmacy is filling the prescription (Not needed for infusion/clinic administered): [facility] - [facility] [address] [address]  Pharmacy Notified: Yes  Patient Notified: Yes  **Instructed pharmacy to notify patient when script is ready to /ship.**

## 2023-02-07 ENCOUNTER — TELEPHONE (OUTPATIENT)
Dept: FAMILY MEDICINE | Facility: CLINIC | Age: 43
End: 2023-02-07
Payer: COMMERCIAL

## 2023-02-07 NOTE — TELEPHONE ENCOUNTER
Per other RN in clinic, Graciela called and stated fax was down and they would  letter at .   RN sees no documentation, so reached back out to Graciela to f/u and ensure addressed.  She states she spoke with other RN again and was able to access letter via [employer], so nothing further needed.   Closing encounter.     Christin Chowdhury RN  [facility]

## 2023-02-07 NOTE — LETTER
February 7, 2023      Tabitha Payton  [address] [address] [address]        To Whom It May Concern,      Patient should discontinue metformin as of 2/6/2023.           Sincerely,        Nicolas Loaiza MD

## 2023-02-07 NOTE — TELEPHONE ENCOUNTER
Verbal order given to Graciela at [facility], but she still needs a letter faxed.   Letter prepared and pended for PCP's signature. Then can be faxed to number in initial message by [facility].    Christin Chowdhury RN  [facility]

## 2023-02-07 NOTE — TELEPHONE ENCOUNTER
leela with group home called stating that she needs an order to discontinue on metformin. Patient has not had metformin for the last two days. Last dose was Sunday. She would be able to start wengovi today if group home receives orders to cancel metformin before 4pm. Today.      Fax [phone].     Semaglutide-Weight Management 0.25 MG/0.5ML (wengovy)    [facility]

## 2023-02-08 DIAGNOSIS — Z78.9 OTHER SPECIFIED HEALTH STATUS: ICD-10-CM

## 2023-02-08 DIAGNOSIS — E78.00 HIGH CHOLESTEROL: ICD-10-CM

## 2023-02-09 NOTE — TELEPHONE ENCOUNTER
Dr Loaiza  Pt had a med check appt for Wegovy set for 4-18. Since still going thru pa process would you like the date of the med check pushed out?      Prabhjot Garcia RN

## 2023-02-10 RX ORDER — SIMVASTATIN 20 MG
20 TABLET ORAL AT BEDTIME
Qty: 30 TABLET | Refills: 11 | OUTPATIENT
Start: 2023-02-10

## 2023-02-10 RX ORDER — MULTIVIT-MIN/FA/LYCOPEN/LUTEIN .4-300-25
TABLET ORAL
Qty: 100 TABLET | Refills: 1 | Status: SHIPPED | OUTPATIENT
Start: 2023-02-10 | End: 2023-08-28

## 2023-02-14 ENCOUNTER — VIRTUAL VISIT (OUTPATIENT)
Dept: RHEUMATOLOGY | Facility: CLINIC | Age: 43
End: 2023-02-14
Payer: COMMERCIAL

## 2023-02-14 DIAGNOSIS — Z79.899 HIGH RISK MEDICATION USE: ICD-10-CM

## 2023-02-14 DIAGNOSIS — L40.9 PSORIASIS: ICD-10-CM

## 2023-02-14 DIAGNOSIS — R41.89 COGNITIVE IMPAIRMENT: ICD-10-CM

## 2023-02-14 DIAGNOSIS — L40.50 PSORIATIC ARTHRITIS (H): Primary | ICD-10-CM

## 2023-02-14 PROCEDURE — 99214 OFFICE O/P EST MOD 30 MIN: CPT | Mod: VID | Performed by: INTERNAL MEDICINE

## 2023-02-14 NOTE — PROGRESS NOTES
Video-Visit Details    Type of service:  Video Visit  Joined the call at 2/14/2023, 12:48:53 pm.  Left the call at 2/14/2023, 12:55:11 pm.  You were on the call for 6 minutes 17 seconds .  Originating Location (pt. Location): Long term Care        Distant Location (provider location):  On-site    Mode of Communication:  Video Conference via [employer]      This document was created using a software with less than 100% fidelity, at times resulting in unintended, even erroneous syntax and grammar.  The reader is advised to keep this under consideration while reviewing, interpreting this note.           ASSESSMENT AND PLAN:    Diagnoses and all orders for this visit:  Psoriatic arthritis (H)  -     methotrexate 2.5 MG tablet; Take 10 tablets (25 mg) by mouth once a week Strength: 2.5 mg  Psoriasis  -     methotrexate 2.5 MG tablet; Take 10 tablets (25 mg) by mouth once a week Strength: 2.5 mg  High risk medication use  Cognitive impairment      Follow-up next available appointment for evaluation of the shoulder pain meanwhile continue methotrexate along with folic acid as now.        HISTORY OF PRESENTING ILLNESS:  Tabitha Payton 43 year old is evaluated here via video/audio link.  This was facilitated by CARRIE Owens.  This patient has psoriatic arthritis, at one point she was thought to have psoriasis and seronegative rheumatoid arthritis.  She is a group home.  She is on 25 mg of methotrexate per week, she takes folic acid, as noted previously Otezla was discontinued.  Today's conversation happened with the help of MD as the patient refuses to be in front of the camera.  The examination findings, such as range of motion were transmitted based on Graciela's observations at this time.  She is complaining of pain in left shoulder going on for the last 3 months there is no history of trauma or fall there is no radiation down the arm sometimes woken up from sleep.       ROS enquiry held for fever, ocular symptoms, rash,  headache,  GI issues.  Today we also discussed the issues related to the current pandemic, the pros and cons of the current treatment plan, the CDC guidelines such as social distancing washing the hands covering the cough.  ALLERGIES:Animal dander, Dust mites, and Seasonal allergies    PAST MEDICAL/ACTIVE PROBLEMS/MEDICATION/SOCIAL DATA  Past Medical History:   Diagnosis Date     Psoriatic arthritis (H) 6/23/2021     Psoriatic arthritis (H) 6/23/2021     History   Smoking Status     Never   Smokeless Tobacco     Never     Patient Active Problem List   Diagnosis     Psoriasis     Psoriatic arthritis (H)     Bipolar 1 disorder (H)     Trauma and stressor-related disorder     Disorder of intellectual development     Class 1 obesity due to excess calories without serious comorbidity with body mass index (BMI) of 31.0 to 31.9 in adult     Conductive hearing loss of right ear with unrestricted hearing of left ear     Current Outpatient Medications   Medication Sig Dispense Refill     cetirizine (ZYRTEC) 10 MG tablet Take 1 tablet (10 mg) by mouth daily 90 tablet 3     escitalopram (LEXAPRO) 5 MG tablet        folic acid (FOLVITE) 1 MG tablet Take 1 tablet (1 mg) by mouth daily 90 tablet 3     INVEGA TRINZA 819 MG/2.63ML COLETTE intramuscular 3 month injection        LORYNA 3-0.02 MG tablet Take 1 tablet by mouth daily 28 tablet 1     methotrexate 2.5 MG tablet Take 10 tablets (25 mg) by mouth once a week Strength: 2.5 mg 40 tablet 0     mineral oil-hydrophilic petrolatum (AQUAPHOR) external ointment Apply topically daily as needed for irritation 420 g 3     mometasone (ELOCON) 0.1 % external solution Apply topically daily as needed (scalp itching/dryness) Apply to scalp and massage at bedtime and wash out in morning. 60 mL 0     Multiple Vitamins-Minerals (CERTAVITE SENIOR/ANTIOXIDANT) TABS Take 1 tablet by mouth daily 100 tablet 1     nystatin (MYCOSTATIN) 667924 UNIT/GM external ointment Apply topically 2 times daily as  needed for dry skin 30 g 11     OLANZapine (ZYPREXA) 10 MG tablet TAKE 1/2 TABLET BY MOUTH TWICE DAILY AS NEEDED       polyethylene glycol (MIRALAX) 17 GM/Dose powder Mix 1/2 capful daily as needed for constipation or constipation prevention in 8 oz of liquid and drink. 1 g 0     propranolol ER (INDERAL LA) 80 MG 24 hr capsule Take 1 capsule (80 mg) by mouth daily 90 capsule 1     semaglutide (OZEMPIC) 2 MG/1.5ML SOPN pen Inject 0.25 mg Subcutaneous every 7 days for 28 days (abdomen or thigh). Plus or minus 1 day. 0.75 mL 0     [START ON 2/28/2023] semaglutide (OZEMPIC) 2 MG/1.5ML SOPN pen Inject 0.5 mg Subcutaneous every 7 days for 8 doses (abdomen or thigh). Plus or minus 1 day. 3 mL 0     Semaglutide-Weight Management 0.25 MG/0.5ML SOAJ Inject 0.25 mg Subcutaneous every 7 days 2 mL 0     topiramate ER (QUDEXY XR) 25 MG 24 hr capsule        triamcinolone (KENALOG) 0.1 % external ointment Apply topically 3 times daily as needed (skin irritation) 80 g 4         EXAMINATION:    Using the audio and video link as best as possible the constitutional, neck, neurologic, psych, skin, both upper extremities areas/organ system were evaluated during this assessment.  Some of the important findings: Alert, oriented, speech fluent.    Able to fully flex the digits, into fists bilaterally, wrist and elbow range of motion appear normal, abduction of the shoulder is normal.      LAB / IMAGING DATA:  ALT   Date Value Ref Range Status   01/18/2023 17 10 - 35 U/L Final   10/18/2022 23 10 - 35 U/L Final   06/09/2022 22 0 - 50 U/L Final   03/29/2021 25 0 - 50 U/L Final     Albumin   Date Value Ref Range Status   01/18/2023 4.1 3.5 - 5.2 g/dL Final   10/18/2022 4.2 3.5 - 5.2 g/dL Final   06/09/2022 3.2 (L) 3.4 - 5.0 g/dL Final   03/15/2022 3.4 3.4 - 5.0 g/dL Final   01/06/2022 3.1 (L) 3.4 - 5.0 g/dL Final   03/29/2021 3.4 3.4 - 5.0 g/dL Final       WBC Count   Date Value Ref Range Status   01/18/2023 9.8 4.0 - 11.0 10e3/uL Final    10/18/2022 11.0 4.0 - 11.0 10e3/uL Final     Hemoglobin   Date Value Ref Range Status   01/18/2023 12.3 11.7 - 15.7 g/dL Final   10/18/2022 12.7 11.7 - 15.7 g/dL Final   06/09/2022 12.5 11.7 - 15.7 g/dL Final     Platelet Count   Date Value Ref Range Status   01/18/2023 326 150 - 450 10e3/uL Final   10/18/2022 323 150 - 450 10e3/uL Final   06/09/2022 332 150 - 450 10e3/uL Final       No results found for: PONCHO

## 2023-02-25 DIAGNOSIS — E66.09 CLASS 1 OBESITY DUE TO EXCESS CALORIES WITHOUT SERIOUS COMORBIDITY WITH BODY MASS INDEX (BMI) OF 31.0 TO 31.9 IN ADULT: ICD-10-CM

## 2023-02-25 DIAGNOSIS — E66.811 CLASS 1 OBESITY DUE TO EXCESS CALORIES WITHOUT SERIOUS COMORBIDITY WITH BODY MASS INDEX (BMI) OF 31.0 TO 31.9 IN ADULT: ICD-10-CM

## 2023-02-27 ENCOUNTER — MYC MEDICAL ADVICE (OUTPATIENT)
Dept: FAMILY MEDICINE | Facility: CLINIC | Age: 43
End: 2023-02-27
Payer: COMMERCIAL

## 2023-02-27 DIAGNOSIS — E66.09 CLASS 1 OBESITY DUE TO EXCESS CALORIES WITHOUT SERIOUS COMORBIDITY WITH BODY MASS INDEX (BMI) OF 31.0 TO 31.9 IN ADULT: ICD-10-CM

## 2023-02-27 DIAGNOSIS — E66.811 CLASS 1 OBESITY DUE TO EXCESS CALORIES WITHOUT SERIOUS COMORBIDITY WITH BODY MASS INDEX (BMI) OF 31.0 TO 31.9 IN ADULT: ICD-10-CM

## 2023-02-27 NOTE — LETTER
March 3, 2023      Tabitha Bennettonnor  [address] [address] [facility] [address]        To Whom It May Concern,      Tabitha is taking the following medication:    Semaglutide-Weight Management 0.25 MG/0.5ML SOAJ  (Wegovy)        Sig - Route: Inject 0.25 mg Subcutaneous every 7 days - Subcutaneous         Sincerely,        Nicolas Loaiza MD

## 2023-02-27 NOTE — LETTER
February 28, 2023      Tabitha JOHNSON Payton  [address] [address] [facility] [address]        To Whom It May Concern,     Order Information    Ordered Status Priority Ordering User Department   01/18/23 Sent (none) Nicolas Loaiza MD [facility]     Outpatient Medication Detail     Disp Refills Start End JASIEL   semaglutide (OZEMPIC) 2 MG/1.5ML SOPN pen 3 mL 0 2/28/2023 4/19/2023 --   Sig - Route: Inject 0.5 mg Subcutaneous every 7 days for 8 doses (abdomen or thigh). Plus or minus 1 day. - Subcutaneous   Sent to pharmacy as: Semaglutide(0.25 or 0.5MG/DOS) 2 MG/1.5ML Solution Pen-injector (OZEMPIC)   Class: E-Prescribe   Order: [id number]   E-Prescribing Status: Receipt confirmed by pharmacy (1/18/2023  9:51 AM CST)         Sincerely,    Nicolas Loaiza MD

## 2023-02-28 RX ORDER — SEMAGLUTIDE 0.25 MG/.5ML
INJECTION, SOLUTION SUBCUTANEOUS
Qty: 2 ML | Refills: 0 | OUTPATIENT
Start: 2023-02-28

## 2023-02-28 NOTE — TELEPHONE ENCOUNTER
Denied refill.  Dose is to be increase to 0.5mg after 4 weeks at the 0.25mg.  Are they unable to get the 0.5mg?  Thank you,  Nicolas Loaiza MD

## 2023-03-03 DIAGNOSIS — K59.00 CONSTIPATION, UNSPECIFIED CONSTIPATION TYPE: ICD-10-CM

## 2023-03-03 NOTE — TELEPHONE ENCOUNTER
MyChart reply sent to patient and closing encounter.     Christin Chowdhury RN  [facility]

## 2023-03-03 NOTE — TELEPHONE ENCOUNTER
Ok sent.  ASSESSMENT/PLAN  Tabitha was seen today for medication request.    Diagnoses and all orders for this visit:    Class 1 obesity due to excess calories without serious comorbidity with body mass index (BMI) of 31.0 to 31.9 in adult  -     Semaglutide-Weight Management (WEGOVY) 0.25 MG/0.5ML pen; Inject 0.25 mg Subcutaneous every 7 days        Nicolas Loaiza MD  [facility]

## 2023-03-03 NOTE — TELEPHONE ENCOUNTER
Staff with group home calls with request for new Rx for semaglutide.  Needs to be generic and not Ozempic, as insurance only covers Wegovy.   She's also requesting it be the 0.25 mg dosing vs the 0.5 mg dosing, per previous conversation with PCP, as patient already losing a lot of weight on the 0.25 mg dosing, so they don't want to increase to the 0.5 mg.   They are also requesting another letter they can access via [employer], but with Wegovy instead of Ozempic.   RN pended new Rx and letter for PCP's review.     Christin Chowdhury RN  [facility]

## 2023-03-06 RX ORDER — POLYETHYLENE GLYCOL 3350 17 G/17G
POWDER, FOR SOLUTION ORAL
Qty: 510 G | Refills: 5 | Status: SHIPPED | OUTPATIENT
Start: 2023-03-06 | End: 2024-09-09

## 2023-03-07 DIAGNOSIS — Z00.00 ROUTINE GENERAL MEDICAL EXAMINATION AT A HEALTH CARE FACILITY: ICD-10-CM

## 2023-03-08 RX ORDER — DROSPIRENONE AND ETHINYL ESTRADIOL TABLETS 0.02-3(28)
KIT ORAL
Qty: 28 TABLET | Refills: 0 | Status: SHIPPED | OUTPATIENT
Start: 2023-03-08 | End: 2023-04-17

## 2023-04-14 DIAGNOSIS — Z00.00 ROUTINE GENERAL MEDICAL EXAMINATION AT A HEALTH CARE FACILITY: ICD-10-CM

## 2023-04-17 RX ORDER — DROSPIRENONE AND ETHINYL ESTRADIOL TABLETS 0.02-3(28)
KIT ORAL
Qty: 28 TABLET | Refills: 0 | Status: SHIPPED | OUTPATIENT
Start: 2023-04-17 | End: 2023-05-03

## 2023-04-24 ENCOUNTER — LAB (OUTPATIENT)
Dept: LAB | Facility: CLINIC | Age: 43
End: 2023-04-24
Payer: COMMERCIAL

## 2023-04-24 DIAGNOSIS — L40.50 PSORIATIC ARTHRITIS (H): ICD-10-CM

## 2023-04-24 LAB
ALBUMIN SERPL BCG-MCNC: 4.2 G/DL (ref 3.5–5.2)
ALT SERPL W P-5'-P-CCNC: 21 U/L (ref 10–35)
CREAT SERPL-MCNC: 0.8 MG/DL (ref 0.51–0.95)
ERYTHROCYTE [DISTWIDTH] IN BLOOD BY AUTOMATED COUNT: 14 % (ref 10–15)
GFR SERPL CREATININE-BSD FRML MDRD: >90 ML/MIN/1.73M2
HCT VFR BLD AUTO: 39.6 % (ref 35–47)
HGB BLD-MCNC: 12.6 G/DL (ref 11.7–15.7)
MCH RBC QN AUTO: 27.3 PG (ref 26.5–33)
MCHC RBC AUTO-ENTMCNC: 31.8 G/DL (ref 31.5–36.5)
MCV RBC AUTO: 86 FL (ref 78–100)
PLATELET # BLD AUTO: 314 10E3/UL (ref 150–450)
RBC # BLD AUTO: 4.62 10E6/UL (ref 3.8–5.2)
WBC # BLD AUTO: 9 10E3/UL (ref 4–11)

## 2023-04-24 PROCEDURE — 36415 COLL VENOUS BLD VENIPUNCTURE: CPT

## 2023-04-24 PROCEDURE — 82040 ASSAY OF SERUM ALBUMIN: CPT

## 2023-04-24 PROCEDURE — 85027 COMPLETE CBC AUTOMATED: CPT

## 2023-04-24 PROCEDURE — 84460 ALANINE AMINO (ALT) (SGPT): CPT

## 2023-04-24 PROCEDURE — 82565 ASSAY OF CREATININE: CPT

## 2023-05-03 ENCOUNTER — OFFICE VISIT (OUTPATIENT)
Dept: FAMILY MEDICINE | Facility: CLINIC | Age: 43
End: 2023-05-03
Payer: COMMERCIAL

## 2023-05-03 VITALS
DIASTOLIC BLOOD PRESSURE: 66 MMHG | RESPIRATION RATE: 16 BRPM | BODY MASS INDEX: 28.94 KG/M2 | HEART RATE: 89 BPM | TEMPERATURE: 97.5 F | WEIGHT: 176.6 LBS | SYSTOLIC BLOOD PRESSURE: 92 MMHG | OXYGEN SATURATION: 97 %

## 2023-05-03 DIAGNOSIS — Z30.41 ENCOUNTER FOR SURVEILLANCE OF CONTRACEPTIVE PILLS: ICD-10-CM

## 2023-05-03 DIAGNOSIS — E66.811 CLASS 1 OBESITY DUE TO EXCESS CALORIES WITHOUT SERIOUS COMORBIDITY WITH BODY MASS INDEX (BMI) OF 31.0 TO 31.9 IN ADULT: Primary | ICD-10-CM

## 2023-05-03 DIAGNOSIS — E66.09 CLASS 1 OBESITY DUE TO EXCESS CALORIES WITHOUT SERIOUS COMORBIDITY WITH BODY MASS INDEX (BMI) OF 31.0 TO 31.9 IN ADULT: Primary | ICD-10-CM

## 2023-05-03 PROCEDURE — 99213 OFFICE O/P EST LOW 20 MIN: CPT | Performed by: FAMILY MEDICINE

## 2023-05-03 RX ORDER — DROSPIRENONE AND ETHINYL ESTRADIOL 0.02-3(28)
1 KIT ORAL DAILY
Qty: 84 TABLET | Refills: 4 | Status: SHIPPED | OUTPATIENT
Start: 2023-05-03 | End: 2024-01-17

## 2023-05-03 ASSESSMENT — PAIN SCALES - GENERAL: PAINLEVEL: NO PAIN (0)

## 2023-05-03 NOTE — PROGRESS NOTES
"  Assessment & Plan     Class 1 obesity due to excess calories without serious comorbidity with body mass index (BMI) of 31.0 to 31.9 in adult  Improving down 13%  Plan to increase to 0.5mg weekly and recheck in July, sooner if concerns.  - Semaglutide-Weight Management (WEGOVY) 0.5 MG/0.5ML pen; Inject 0.5 mg Subcutaneous every 7 days (+/- 1 day)    Encounter for surveillance of contraceptive pills  - drospirenone-ethinyl estradiol (LORYNA) 3-0.02 MG tablet; Take 1 tablet by mouth daily       BMI:   Estimated body mass index is 28.94 kg/m  as calculated from the following:    Height as of 1/18/23: 1.664 m (5' 5.5\").    Weight as of this encounter: 80.1 kg (176 lb 9.6 oz).   Working on weight loss.    See Patient Instructions    Nicolas Loaiza MD  [facility] is a 43 year old, presenting for the following health issues:  Recheck Medication        5/3/2023     8:41 AM   Additional Questions   Roomed by Sole Tong MA   Accompanied by \[facility]\""         5/3/2023     8:41 AM   Patient Reported Additional Medications   Patient reports taking the following new medications none     History of Present Illness       Reason for visit:  Medication    She eats 2-3 servings of fruits and vegetables daily.She consumes 2 sweetened beverage(s) daily.She exercises with enough effort to increase her heart rate 20 to 29 minutes per day.  She exercises with enough effort to increase her heart rate 4 days per week. She is missing 1 dose(s) of medications per week.       Medication Followup of Wegovy 0.25mg weekly    Taking Medication as prescribed: yes    Side Effects:  None, looser stools    Medication Helping Symptoms:  Yes    Home scale    Jan 202.8 lbs    Feb 197 lbs    March 185 lbs    April 174 lbs    May 176 lbs  Down 13.3%  Wt Readings from Last 4 Encounters:   05/03/23 80.1 kg (176 lb 9.6 oz)   01/18/23 92 kg (202 lb 12.8 oz)   08/02/22 90 kg (198 lb 6.4 oz)   02/08/22 89.8 kg " (198 lb)         Medication Followup of Julio César    Taking Medication as prescribed: yes    Side Effects:  None    Medication Helping Symptoms:  yes        Review of Systems       Objective    BP 92/66 (BP Location: Right arm, Patient Position: Sitting, Cuff Size: Adult Large)   Pulse 89   Temp 97.5  F (36.4  C) (Tympanic)   Resp 16   Wt 86.2 kg (190 lb)   LMP  (LMP Unknown)   SpO2 97%   BMI 31.14 kg/m    Body mass index is 31.14 kg/m .  Physical Exam   GENERAL: healthy, alert and no distress  PSYCH: mentation appears normal, affect normal/bright

## 2023-05-03 NOTE — PATIENT INSTRUCTIONS
Increase the Wegovy to 0.5mg once a week  Recheck in July  Let me know sooner if any concerns or side effects with medication.

## 2023-05-07 DIAGNOSIS — L40.50 PSORIATIC ARTHRITIS (H): ICD-10-CM

## 2023-05-07 DIAGNOSIS — L40.9 PSORIASIS: ICD-10-CM

## 2023-05-08 RX ORDER — METHOTREXATE 2.5 MG/1
TABLET ORAL
Qty: 40 TABLET | Refills: 2 | Status: SHIPPED | OUTPATIENT
Start: 2023-05-08 | End: 2023-10-04

## 2023-05-18 ENCOUNTER — MYC MEDICAL ADVICE (OUTPATIENT)
Dept: FAMILY MEDICINE | Facility: CLINIC | Age: 43
End: 2023-05-18
Payer: COMMERCIAL

## 2023-05-18 NOTE — TELEPHONE ENCOUNTER
Form routed to Dr. Loaiza to review.    Mima Boo [facility] on 5/18/2023 at 3:52 PM

## 2023-05-19 NOTE — TELEPHONE ENCOUNTER
Form completed and is hanging at my desk, sent mychart how to send back.    Mima Boo [facility] on 5/19/2023 at 8:41 AM

## 2023-05-23 ENCOUNTER — OFFICE VISIT (OUTPATIENT)
Dept: RHEUMATOLOGY | Facility: CLINIC | Age: 43
End: 2023-05-23
Payer: COMMERCIAL

## 2023-05-23 VITALS — WEIGHT: 193.4 LBS | BODY MASS INDEX: 31.69 KG/M2

## 2023-05-23 DIAGNOSIS — Z79.899 HIGH RISK MEDICATION USE: ICD-10-CM

## 2023-05-23 DIAGNOSIS — L40.50 PSORIATIC ARTHRITIS (H): Primary | ICD-10-CM

## 2023-05-23 DIAGNOSIS — L40.9 PSORIASIS: ICD-10-CM

## 2023-05-23 PROCEDURE — 99214 OFFICE O/P EST MOD 30 MIN: CPT | Performed by: INTERNAL MEDICINE

## 2023-05-23 NOTE — PROGRESS NOTES
Rheumatology follow-up visit note     Tabitha is a 43 year old female presents today for follow-up.    Tabitha was seen today for recheck.    Diagnoses and all orders for this visit:    Psoriatic arthritis (H)    Psoriasis    High risk medication use          HPI    Tabitha Payton is a 43 year old female is here for follow-up of psoriatic arthritis accompanied by a caregiver from the group home.  She reports no pain in the joints.  She is on methotrexate as noted.  Most recent labs done recently within acceptable range she is also on folic acid.  She has expressed her inclination not to take methotrexate anymore in the past.  Today she would like to discontinue completely.  She used to have quite significant pain and swelling of the left index finger.  She is aware of the potential for flareup.  She is going to follow-up here on as-needed basis.    DETAILED EXAMINATION  05/23/23  :    Vitals:    05/23/23 1121   Weight: 87.7 kg (193 lb 6.4 oz)     Alert oriented. Head including the face is examined for malar rash, heliotropes, scarring, lupus pernio. Eyes examined for redness such as in episcleritis/scleritis, periorbital lesions.   Neck/ Face examined for parotid gland swelling, range of motion of neck.  Left upper and lower and right upper and lower extremities examined for tenderness, swelling, warmth of the appendicular joints, range of motion, edema, rash.  Some of the important findings included: she does not have evidence of synovitis in the palpable joints of the upper extremities.  No significant deformities of the digits.  She does not have dactylitis.  Patient Active Problem List    Diagnosis Date Noted     Conductive hearing loss of right ear with unrestricted hearing of left ear 11/09/2022     Priority: Medium     Bipolar 1 disorder (H) 07/21/2021     Priority: Medium     Trauma and stressor-related disorder 07/21/2021     Priority: Medium     Disorder of intellectual development 07/21/2021      Priority: Medium     Class 1 obesity due to excess calories without serious comorbidity with body mass index (BMI) of 31.0 to 31.9 in adult 07/21/2021     Priority: Medium     Psoriasis 06/23/2021     Priority: Medium     Psoriatic arthritis (H) 06/23/2021     Priority: Medium     Past Surgical History:   Procedure Laterality Date     deviated septum 2000      Past Medical History:   Diagnosis Date     Psoriatic arthritis (H) 6/23/2021     Psoriatic arthritis (H) 6/23/2021     Allergies   Allergen Reactions     Animal Dander      Dust Mites      Seasonal Allergies      Current Outpatient Medications   Medication Sig Dispense Refill     cetirizine (ZYRTEC) 10 MG tablet Take 1 tablet (10 mg) by mouth daily 90 tablet 3     drospirenone-ethinyl estradiol (LORYNA) 3-0.02 MG tablet Take 1 tablet by mouth daily 84 tablet 4     escitalopram (LEXAPRO) 5 MG tablet        folic acid (FOLVITE) 1 MG tablet Take 1 tablet (1 mg) by mouth daily 90 tablet 3     INVEGA TRINZA 819 MG/2.63ML COLETTE intramuscular 3 month injection        methotrexate sodium 2.5 MG TABS Take 10 tablets (25 mg) by mouth once a week 40 tablet 2     mineral oil-hydrophilic petrolatum (AQUAPHOR) external ointment Apply topically daily as needed for irritation 420 g 3     mometasone (ELOCON) 0.1 % external solution Apply topically daily as needed (scalp itching/dryness) Apply to scalp and massage at bedtime and wash out in morning. 60 mL 0     Multiple Vitamins-Minerals (CERTAVITE SENIOR/ANTIOXIDANT) TABS Take 1 tablet by mouth daily 100 tablet 1     nystatin (MYCOSTATIN) 016870 UNIT/GM external ointment Apply topically 2 times daily as needed for dry skin 30 g 11     OLANZapine (ZYPREXA) 10 MG tablet TAKE 1/2 TABLET BY MOUTH TWICE DAILY AS NEEDED       polyethylene glycol (MIRALAX) 17 GM/Dose powder Mix 1/2 to 1 full capful daily as needed for constipation or constipation prevention in 8 oz of liquid and drink. 510 g 5     propranolol ER (INDERAL LA) 80  MG 24 hr capsule Take 1 capsule (80 mg) by mouth daily 90 capsule 1     Semaglutide-Weight Management (WEGOVY) 0.5 MG/0.5ML pen Inject 0.5 mg Subcutaneous every 7 days (+/- 1 day) 2 mL 2     simvastatin (ZOCOR) 20 MG tablet Take 1 tablet (20 mg) by mouth At Bedtime 30 tablet 3     topiramate ER (QUDEXY XR) 25 MG 24 hr capsule        triamcinolone (KENALOG) 0.1 % external ointment Apply topically 3 times daily as needed (skin irritation) 80 g 4     family history includes Unknown/Adopted in her mother.  Social Connections: Not on file          WBC Count   Date Value Ref Range Status   04/24/2023 9.0 4.0 - 11.0 10e3/uL Final     RBC Count   Date Value Ref Range Status   04/24/2023 4.62 3.80 - 5.20 10e6/uL Final     Hemoglobin   Date Value Ref Range Status   04/24/2023 12.6 11.7 - 15.7 g/dL Final     Hematocrit   Date Value Ref Range Status   04/24/2023 39.6 35.0 - 47.0 % Final     MCV   Date Value Ref Range Status   04/24/2023 86 78 - 100 fL Final     MCH   Date Value Ref Range Status   04/24/2023 27.3 26.5 - 33.0 pg Final     Platelet Count   Date Value Ref Range Status   04/24/2023 314 150 - 450 10e3/uL Final     % Lymphocytes   Date Value Ref Range Status   01/06/2022 22 % Final     AST   Date Value Ref Range Status   03/29/2021 16 0 - 45 U/L Final     ALT   Date Value Ref Range Status   04/24/2023 21 10 - 35 U/L Final   03/29/2021 25 0 - 50 U/L Final     Albumin   Date Value Ref Range Status   04/24/2023 4.2 3.5 - 5.2 g/dL Final   06/09/2022 3.2 (L) 3.4 - 5.0 g/dL Final   03/29/2021 3.4 3.4 - 5.0 g/dL Final     Alkaline Phosphatase   Date Value Ref Range Status   03/29/2021 93 40 - 150 U/L Final     Creatinine   Date Value Ref Range Status   04/24/2023 0.80 0.51 - 0.95 mg/dL Final   03/29/2021 0.71 0.52 - 1.04 mg/dL Final     GFR Estimate   Date Value Ref Range Status   04/24/2023 >90 >60 mL/min/1.73m2 Final     Comment:     eGFR calculated using 2021 CKD-EPI equation.   04/21/2021 >60 >60 mL/min/1.73m2 Final    03/29/2021 >90 >60 mL/min/[1.73_m2] Final     Comment:     Non  GFR Calc  Starting 12/18/2018, serum creatinine based estimated GFR (eGFR) will be   calculated using the Chronic Kidney Disease Epidemiology Collaboration   (CKD-EPI) equation.       GFR Estimate If Black   Date Value Ref Range Status   04/21/2021 >60 >60 mL/min/1.73m2 Final   03/29/2021 >90 >60 mL/min/[1.73_m2] Final     Comment:      GFR Calc  Starting 12/18/2018, serum creatinine based estimated GFR (eGFR) will be   calculated using the Chronic Kidney Disease Epidemiology Collaboration   (CKD-EPI) equation.       Erythrocyte Sedimentation Rate   Date Value Ref Range Status   04/21/2021 28 (H) 0 - 20 mm/hr Final     CRP   Date Value Ref Range Status   04/21/2021 0.7 0.0 - 0.8 mg/dL Final

## 2023-08-15 DIAGNOSIS — E66.09 CLASS 1 OBESITY DUE TO EXCESS CALORIES WITHOUT SERIOUS COMORBIDITY WITH BODY MASS INDEX (BMI) OF 31.0 TO 31.9 IN ADULT: ICD-10-CM

## 2023-08-15 DIAGNOSIS — E66.811 CLASS 1 OBESITY DUE TO EXCESS CALORIES WITHOUT SERIOUS COMORBIDITY WITH BODY MASS INDEX (BMI) OF 31.0 TO 31.9 IN ADULT: ICD-10-CM

## 2023-08-15 RX ORDER — SEMAGLUTIDE 0.5 MG/.5ML
INJECTION, SOLUTION SUBCUTANEOUS
Qty: 2 ML | Refills: 1 | Status: SHIPPED | OUTPATIENT
Start: 2023-08-15 | End: 2023-10-04

## 2023-08-16 ENCOUNTER — TELEPHONE (OUTPATIENT)
Dept: FAMILY MEDICINE | Facility: CLINIC | Age: 43
End: 2023-08-16
Payer: COMMERCIAL

## 2023-08-16 DIAGNOSIS — E66.811 CLASS 1 OBESITY DUE TO EXCESS CALORIES WITHOUT SERIOUS COMORBIDITY WITH BODY MASS INDEX (BMI) OF 31.0 TO 31.9 IN ADULT: ICD-10-CM

## 2023-08-16 DIAGNOSIS — E66.09 CLASS 1 OBESITY DUE TO EXCESS CALORIES WITHOUT SERIOUS COMORBIDITY WITH BODY MASS INDEX (BMI) OF 31.0 TO 31.9 IN ADULT: ICD-10-CM

## 2023-08-16 NOTE — TELEPHONE ENCOUNTER
Prior Authorization Retail Medication Request    Medication/Dose:   ICD code (if different than what is on RX):    Previously Tried and Failed:    Rationale:      Insurance Name:  [employer] [facility]  Insurance ID:[id number]      Pharmacy Information (if different than what is on RX)  Name:  Phone:                                      Insurance is requiring a prior authozation on nicole --[phone]--- thank you

## 2023-08-18 NOTE — TELEPHONE ENCOUNTER
Central Prior Authorization Team   Phone: [phone]    [place] Initiation    Medication: WEGOVY 0.5 MG/0.5ML SC SOAJ  Insurance Company: [employer] - Phone [phone] Fax [phone]  Pharmacy Filling the Rx: [facility] [place], [place] - [address]  Filling Pharmacy Phone: [phone]  Filling Pharmacy Fax:    Start Date: 8/18/2023

## 2023-08-18 NOTE — TELEPHONE ENCOUNTER
Prior Authorization Approval    Medication: WEGOVY 0.5 MG/0.5ML SC SOAJ  Authorization Effective Date: 7/19/2023  Authorization Expiration Date: 8/17/2024  Approved Dose/Quantity:   Reference #:     Insurance Company: [employer] - Phone [phone] Fax [phone]  Expected CoPay:       CoPay Card Available:      Financial Assistance Needed:   Which Pharmacy is filling the prescription: [facility] [place], [place] - [address] [place] [address]  Pharmacy Notified: Yes  Patient Notified: No  **Instructed pharmacy to notify patient when script is ready to /ship.**

## 2023-08-21 RX ORDER — SEMAGLUTIDE 0.5 MG/.5ML
INJECTION, SOLUTION SUBCUTANEOUS
Qty: 2 ML | Refills: 1 | OUTPATIENT
Start: 2023-08-21

## 2023-08-27 DIAGNOSIS — Z78.9 OTHER SPECIFIED HEALTH STATUS: ICD-10-CM

## 2023-08-28 RX ORDER — GLUCOSA SU 2KCL/CHONDROITIN SU 500-400 MG
1 CAPSULE ORAL DAILY
Qty: 100 TABLET | Refills: 1 | Status: SHIPPED | OUTPATIENT
Start: 2023-08-28 | End: 2024-03-05

## 2023-10-04 ENCOUNTER — OFFICE VISIT (OUTPATIENT)
Dept: FAMILY MEDICINE | Facility: CLINIC | Age: 43
End: 2023-10-04
Payer: COMMERCIAL

## 2023-10-04 VITALS
HEIGHT: 66 IN | RESPIRATION RATE: 16 BRPM | OXYGEN SATURATION: 98 % | DIASTOLIC BLOOD PRESSURE: 58 MMHG | TEMPERATURE: 98.2 F | SYSTOLIC BLOOD PRESSURE: 96 MMHG | WEIGHT: 183.4 LBS | BODY MASS INDEX: 29.47 KG/M2 | HEART RATE: 63 BPM

## 2023-10-04 DIAGNOSIS — E66.811 CLASS 1 OBESITY DUE TO EXCESS CALORIES WITHOUT SERIOUS COMORBIDITY WITH BODY MASS INDEX (BMI) OF 31.0 TO 31.9 IN ADULT: Primary | ICD-10-CM

## 2023-10-04 DIAGNOSIS — E66.09 CLASS 1 OBESITY DUE TO EXCESS CALORIES WITHOUT SERIOUS COMORBIDITY WITH BODY MASS INDEX (BMI) OF 31.0 TO 31.9 IN ADULT: Primary | ICD-10-CM

## 2023-10-04 DIAGNOSIS — R68.89 HEAT INTOLERANCE: ICD-10-CM

## 2023-10-04 PROCEDURE — 90471 IMMUNIZATION ADMIN: CPT | Performed by: FAMILY MEDICINE

## 2023-10-04 PROCEDURE — 90686 IIV4 VACC NO PRSV 0.5 ML IM: CPT | Performed by: FAMILY MEDICINE

## 2023-10-04 PROCEDURE — 99213 OFFICE O/P EST LOW 20 MIN: CPT | Mod: 25 | Performed by: FAMILY MEDICINE

## 2023-10-04 RX ORDER — SEMAGLUTIDE 0.5 MG/.5ML
0.5 INJECTION, SOLUTION SUBCUTANEOUS WEEKLY
Qty: 2 ML | Refills: 1 | Status: SHIPPED | OUTPATIENT
Start: 2023-10-04 | End: 2023-10-23

## 2023-10-04 ASSESSMENT — PAIN SCALES - GENERAL: PAINLEVEL: NO PAIN (0)

## 2023-10-04 NOTE — PROGRESS NOTES
"  Assessment & Plan     Class 1 obesity due to excess calories without serious comorbidity with body mass index (BMI) of 31.0 to 31.9 in adult  Stable, refill, day 2 doesn't want to eat much, so don't want to increase the wegovy dosing and she still needs to eat.  - Semaglutide-Weight Management (WEGOVY) 0.5 MG/0.5ML pen; Inject 0.5 mg Subcutaneous once a week Administer Plus or minus one day    Heat intolerance  No worrisome signs, not hot flashes, consider thyroid check in January if ongoign.         BMI:   Estimated body mass index is 30.06 kg/m  as calculated from the following:    Height as of this encounter: 1.664 m (5' 5.5\").    Weight as of this encounter: 83.2 kg (183 lb 6.4 oz).   Weight management plan: Discussed healthy diet and exercise guidelines    See Patient Instructions    Nicolas Loaiza MD  [facility]    Jonna Holden is a 43 year old, presenting for the following health issues:  Weight Problem (Follow up weight management) and Flu Shot      10/4/2023     8:47 AM   Additional Questions   Roomed by Yamila WERNER   Accompanied by Graciela         10/4/2023     8:47 AM   Patient Reported Additional Medications   Patient reports taking the following new medications none       History of Present Illness       Reason for visit:  Weight management, hot/cold flashes and flu vaccine    She eats 2-3 servings of fruits and vegetables daily.She consumes 2 sweetened beverage(s) daily.She exercises with enough effort to increase her heart rate 9 or less minutes per day.  She exercises with enough effort to increase her heart rate 3 or less days per week. She is missing 1 dose(s) of medications per week.  She is not taking prescribed medications regularly due to other.     Wegovy 0.5mg weekly  Wt Readings from Last 4 Encounters:   10/04/23 83.2 kg (183 lb 6.4 oz)   05/23/23 87.7 kg (193 lb 6.4 oz)   05/03/23 80.1 kg (176 lb 9.6 oz)   01/18/23 92 kg (202 lb 12.8 oz)   Starting weight " "January  was 202.8 pounds, down 10% overall      Feeling hotter than most in house, especially with the air off and these hotter days. No cold feelings on cold days. No bowel changes. No palpitations.        Review of Systems         Objective    BP 96/58 (BP Location: Right arm, Patient Position: Chair, Cuff Size: Adult Regular)   Pulse 63   Temp 98.2  F (36.8  C) (Tympanic)   Resp 16   Ht 1.664 m (5' 5.5\")   Wt 83.2 kg (183 lb 6.4 oz)   SpO2 98%   BMI 30.06 kg/m    Body mass index is 30.06 kg/m .  Physical Exam   GENERAL: healthy, alert and no distress  NECK: no adenopathy, no asymmetry, masses, or scars and thyroid normal to palpation  RESP: lungs clear to auscultation - no rales, rhonchi or wheezes  CV: regular rate and rhythm, normal S1 S2, no S3 or S4, no murmur, click or rub, no peripheral edema and peripheral pulses strong  MS: no gross musculoskeletal defects noted, no edema                  "

## 2023-10-16 DIAGNOSIS — L40.9 PSORIASIS: ICD-10-CM

## 2023-10-17 RX ORDER — MOMETASONE FUROATE 1 MG/ML
SOLUTION TOPICAL DAILY PRN
Qty: 60 ML | Refills: 0 | Status: SHIPPED | OUTPATIENT
Start: 2023-10-17 | End: 2024-09-09

## 2023-10-17 RX ORDER — TRIAMCINOLONE ACETONIDE 1 MG/G
OINTMENT TOPICAL 3 TIMES DAILY PRN
Qty: 80 G | Refills: 4 | Status: SHIPPED | OUTPATIENT
Start: 2023-10-17

## 2023-10-23 ENCOUNTER — TELEPHONE (OUTPATIENT)
Dept: FAMILY MEDICINE | Facility: CLINIC | Age: 43
End: 2023-10-23
Payer: COMMERCIAL

## 2023-10-23 DIAGNOSIS — E66.811 CLASS 1 OBESITY DUE TO EXCESS CALORIES WITHOUT SERIOUS COMORBIDITY WITH BODY MASS INDEX (BMI) OF 31.0 TO 31.9 IN ADULT: ICD-10-CM

## 2023-10-23 DIAGNOSIS — E66.09 CLASS 1 OBESITY DUE TO EXCESS CALORIES WITHOUT SERIOUS COMORBIDITY WITH BODY MASS INDEX (BMI) OF 31.0 TO 31.9 IN ADULT: ICD-10-CM

## 2023-10-23 NOTE — TELEPHONE ENCOUNTER
Dr. Loaiza,    I am unable to pull up [email or web address] [facility] [employer] to forward this medication.  Jen ROBLES RN

## 2023-10-23 NOTE — TELEPHONE ENCOUNTER
FYI - Status Update    Who is Calling: Dianne staff member    Update: Pt normal pharmacy is out of [facility] prescription so requesting prescription get sent to     [facility]   [address], [place]  [phone]      Does caller want a call/response back: Yes     Could we send this information to you in [facility] or would you prefer to receive a phone call?:   Patient would prefer a phone call   Okay to leave a detailed message?: Yes at Other phone number:  Dianne [phone]

## 2023-10-24 RX ORDER — SEMAGLUTIDE 0.5 MG/.5ML
0.5 INJECTION, SOLUTION SUBCUTANEOUS WEEKLY
Qty: 2 ML | Refills: 1 | Status: SHIPPED | OUTPATIENT
Start: 2023-10-24 | End: 2023-10-24

## 2023-10-24 RX ORDER — SEMAGLUTIDE 0.5 MG/.5ML
0.5 INJECTION, SOLUTION SUBCUTANEOUS WEEKLY
Qty: 2 ML | Refills: 1 | Status: SHIPPED | OUTPATIENT
Start: 2023-10-24 | End: 2023-11-14

## 2023-10-24 NOTE — TELEPHONE ENCOUNTER
Dianne notified; she says that they had requested to have it sent to [facility], not [facility]. Prescription resent to this pharmacy as requested.    Kathy Kraus RN  [facility]

## 2023-11-14 ENCOUNTER — MYC REFILL (OUTPATIENT)
Dept: FAMILY MEDICINE | Facility: CLINIC | Age: 43
End: 2023-11-14
Payer: COMMERCIAL

## 2023-11-14 DIAGNOSIS — E66.811 CLASS 1 OBESITY DUE TO EXCESS CALORIES WITHOUT SERIOUS COMORBIDITY WITH BODY MASS INDEX (BMI) OF 31.0 TO 31.9 IN ADULT: ICD-10-CM

## 2023-11-14 DIAGNOSIS — E66.09 CLASS 1 OBESITY DUE TO EXCESS CALORIES WITHOUT SERIOUS COMORBIDITY WITH BODY MASS INDEX (BMI) OF 31.0 TO 31.9 IN ADULT: ICD-10-CM

## 2023-11-14 NOTE — TELEPHONE ENCOUNTER
Routing to ordering provider for consideration, not on refill protocol.           Shauna Glass     RN MSN

## 2023-11-15 RX ORDER — SEMAGLUTIDE 0.5 MG/.5ML
0.5 INJECTION, SOLUTION SUBCUTANEOUS WEEKLY
Qty: 2 ML | Refills: 1 | Status: SHIPPED | OUTPATIENT
Start: 2023-11-15 | End: 2023-12-08

## 2023-12-07 ENCOUNTER — MYC MEDICAL ADVICE (OUTPATIENT)
Dept: PHARMACY | Facility: OTHER | Age: 43
End: 2023-12-07
Payer: COMMERCIAL

## 2023-12-07 DIAGNOSIS — E66.09 CLASS 1 OBESITY DUE TO EXCESS CALORIES WITHOUT SERIOUS COMORBIDITY WITH BODY MASS INDEX (BMI) OF 31.0 TO 31.9 IN ADULT: ICD-10-CM

## 2023-12-07 DIAGNOSIS — E66.811 CLASS 1 OBESITY DUE TO EXCESS CALORIES WITHOUT SERIOUS COMORBIDITY WITH BODY MASS INDEX (BMI) OF 31.0 TO 31.9 IN ADULT: ICD-10-CM

## 2023-12-07 NOTE — TELEPHONE ENCOUNTER
Patient was referred by her [employer] insurance plan.  Called patient to schedule appointment. Left voicemail and sent MyChart.    Rashad Fuller, PharmD, BCACP  Medication Therapy Management Pharmacist  Pager: [phone]

## 2023-12-08 RX ORDER — SEMAGLUTIDE 0.5 MG/.5ML
INJECTION, SOLUTION SUBCUTANEOUS
Qty: 2 ML | Refills: 0 | Status: SHIPPED | OUTPATIENT
Start: 2023-12-08 | End: 2024-01-05

## 2023-12-16 DIAGNOSIS — J30.2 SEASONAL ALLERGIC RHINITIS, UNSPECIFIED TRIGGER: ICD-10-CM

## 2023-12-18 DIAGNOSIS — E78.00 HIGH CHOLESTEROL: ICD-10-CM

## 2023-12-18 RX ORDER — CETIRIZINE HYDROCHLORIDE 10 MG/1
10 TABLET ORAL DAILY
Qty: 90 TABLET | Refills: 0 | Status: SHIPPED | OUTPATIENT
Start: 2023-12-18 | End: 2024-03-14

## 2023-12-18 NOTE — TELEPHONE ENCOUNTER
Pending Prescriptions:                       Disp   Refills    simvastatin (ZOCOR) 20 MG tablet          30 tab*6            Sig: Take 1 tablet (20 mg) by mouth at bedtime

## 2023-12-19 RX ORDER — SIMVASTATIN 20 MG
20 TABLET ORAL AT BEDTIME
Qty: 90 TABLET | Refills: 0 | Status: SHIPPED | OUTPATIENT
Start: 2023-12-19 | End: 2024-01-17

## 2024-01-05 ENCOUNTER — MYC MEDICAL ADVICE (OUTPATIENT)
Dept: FAMILY MEDICINE | Facility: CLINIC | Age: 44
End: 2024-01-05
Payer: COMMERCIAL

## 2024-01-05 NOTE — LETTER
1/5/2024        RE: Tabitha Payton  [id number] [facility] [address]        Please discontinue Wegovy for Tabitha.      Sincerely,        Nicolas Loaiza MD

## 2024-01-05 NOTE — TELEPHONE ENCOUNTER
Letter printed with discontinuation order, signed, please fax to group home.  Thank you,  Nicolas Loaiza MD

## 2024-01-08 DIAGNOSIS — L30.9 DERMATITIS, UNSPECIFIED: ICD-10-CM

## 2024-01-08 NOTE — TELEPHONE ENCOUNTER
Spoke with Graciela from group [place], fax number [phone] was given. Letter faxed to [facility] at number provided  Diane Lyles on 1/8/2024 at 9:17 AM

## 2024-01-10 RX ORDER — MINERAL OIL/HYDROPHIL PETROLAT
OINTMENT (GRAM) TOPICAL DAILY PRN
Qty: 454 G | Refills: 3 | Status: SHIPPED | OUTPATIENT
Start: 2024-01-10

## 2024-01-16 ASSESSMENT — ENCOUNTER SYMPTOMS
HEMATOCHEZIA: 0
DIARRHEA: 0
ARTHRALGIAS: 0
CHILLS: 0
JOINT SWELLING: 0
EYE PAIN: 0
ABDOMINAL PAIN: 0
NERVOUS/ANXIOUS: 0
PARESTHESIAS: 0
DYSURIA: 0
HEADACHES: 0
DIZZINESS: 0
BREAST MASS: 0
SORE THROAT: 0
MYALGIAS: 0
FREQUENCY: 0
NAUSEA: 0
WEAKNESS: 0
FEVER: 0
HEARTBURN: 0
HEMATURIA: 0
CONSTIPATION: 0
SHORTNESS OF BREATH: 0
COUGH: 0
PALPITATIONS: 0

## 2024-01-17 ENCOUNTER — ORDERS ONLY (AUTO-RELEASED) (OUTPATIENT)
Dept: FAMILY MEDICINE | Facility: CLINIC | Age: 44
End: 2024-01-17

## 2024-01-17 ENCOUNTER — OFFICE VISIT (OUTPATIENT)
Dept: FAMILY MEDICINE | Facility: CLINIC | Age: 44
End: 2024-01-17
Payer: COMMERCIAL

## 2024-01-17 VITALS
HEART RATE: 72 BPM | HEIGHT: 66 IN | RESPIRATION RATE: 16 BRPM | WEIGHT: 188.4 LBS | SYSTOLIC BLOOD PRESSURE: 86 MMHG | BODY MASS INDEX: 30.28 KG/M2 | OXYGEN SATURATION: 97 % | DIASTOLIC BLOOD PRESSURE: 56 MMHG | TEMPERATURE: 97.2 F

## 2024-01-17 DIAGNOSIS — L40.50 PSORIATIC ARTHRITIS (H): ICD-10-CM

## 2024-01-17 DIAGNOSIS — Z12.11 SCREEN FOR COLON CANCER: ICD-10-CM

## 2024-01-17 DIAGNOSIS — Z30.41 ENCOUNTER FOR SURVEILLANCE OF CONTRACEPTIVE PILLS: ICD-10-CM

## 2024-01-17 DIAGNOSIS — E78.00 HIGH CHOLESTEROL: ICD-10-CM

## 2024-01-17 DIAGNOSIS — Z13.220 SCREENING FOR LIPID DISORDERS: ICD-10-CM

## 2024-01-17 DIAGNOSIS — Z00.00 ROUTINE GENERAL MEDICAL EXAMINATION AT A HEALTH CARE FACILITY: Primary | ICD-10-CM

## 2024-01-17 DIAGNOSIS — Z51.81 ENCOUNTER FOR THERAPEUTIC DRUG MONITORING: ICD-10-CM

## 2024-01-17 DIAGNOSIS — L30.4 INTERTRIGO: ICD-10-CM

## 2024-01-17 DIAGNOSIS — Z12.4 CERVICAL CANCER SCREENING: ICD-10-CM

## 2024-01-17 DIAGNOSIS — F31.9 BIPOLAR 1 DISORDER (H): ICD-10-CM

## 2024-01-17 DIAGNOSIS — F60.3 BORDERLINE PERSONALITY DISORDER (H): ICD-10-CM

## 2024-01-17 DIAGNOSIS — Z12.31 VISIT FOR SCREENING MAMMOGRAM: ICD-10-CM

## 2024-01-17 LAB
ALBUMIN SERPL BCG-MCNC: 4.2 G/DL (ref 3.5–5.2)
ALP SERPL-CCNC: 94 U/L (ref 40–150)
ALT SERPL W P-5'-P-CCNC: 24 U/L (ref 0–50)
ANION GAP SERPL CALCULATED.3IONS-SCNC: 15 MMOL/L (ref 7–15)
AST SERPL W P-5'-P-CCNC: 23 U/L (ref 0–45)
BILIRUB SERPL-MCNC: <0.2 MG/DL
BUN SERPL-MCNC: 12 MG/DL (ref 6–20)
CALCIUM SERPL-MCNC: 9.7 MG/DL (ref 8.6–10)
CHLORIDE SERPL-SCNC: 105 MMOL/L (ref 98–107)
CHOLEST SERPL-MCNC: 181 MG/DL
CREAT SERPL-MCNC: 0.71 MG/DL (ref 0.51–0.95)
DEPRECATED HCO3 PLAS-SCNC: 21 MMOL/L (ref 22–29)
EGFRCR SERPLBLD CKD-EPI 2021: >90 ML/MIN/1.73M2
FASTING STATUS PATIENT QL REPORTED: NO
GLUCOSE SERPL-MCNC: 113 MG/DL (ref 70–99)
HBA1C MFR BLD: 5.3 % (ref 0–5.6)
HDLC SERPL-MCNC: 65 MG/DL
LDLC SERPL CALC-MCNC: 69 MG/DL
NONHDLC SERPL-MCNC: 116 MG/DL
POTASSIUM SERPL-SCNC: 4 MMOL/L (ref 3.4–5.3)
PROT SERPL-MCNC: 7.5 G/DL (ref 6.4–8.3)
SODIUM SERPL-SCNC: 141 MMOL/L (ref 135–145)
TRIGL SERPL-MCNC: 233 MG/DL

## 2024-01-17 PROCEDURE — 83036 HEMOGLOBIN GLYCOSYLATED A1C: CPT | Performed by: FAMILY MEDICINE

## 2024-01-17 PROCEDURE — G0145 SCR C/V CYTO,THINLAYER,RESCR: HCPCS | Performed by: FAMILY MEDICINE

## 2024-01-17 PROCEDURE — 99396 PREV VISIT EST AGE 40-64: CPT | Performed by: FAMILY MEDICINE

## 2024-01-17 PROCEDURE — 87624 HPV HI-RISK TYP POOLED RSLT: CPT | Performed by: FAMILY MEDICINE

## 2024-01-17 PROCEDURE — 36415 COLL VENOUS BLD VENIPUNCTURE: CPT | Performed by: FAMILY MEDICINE

## 2024-01-17 PROCEDURE — 80053 COMPREHEN METABOLIC PANEL: CPT | Performed by: FAMILY MEDICINE

## 2024-01-17 PROCEDURE — 80061 LIPID PANEL: CPT | Performed by: FAMILY MEDICINE

## 2024-01-17 PROCEDURE — 99213 OFFICE O/P EST LOW 20 MIN: CPT | Mod: 25 | Performed by: FAMILY MEDICINE

## 2024-01-17 RX ORDER — NYSTATIN 100000 U/G
OINTMENT TOPICAL 2 TIMES DAILY
Qty: 30 G | Refills: 11 | Status: SHIPPED | OUTPATIENT
Start: 2024-01-17 | End: 2024-01-31

## 2024-01-17 RX ORDER — SIMVASTATIN 20 MG
20 TABLET ORAL AT BEDTIME
Qty: 90 TABLET | Refills: 3 | Status: SHIPPED | OUTPATIENT
Start: 2024-01-17

## 2024-01-17 RX ORDER — DROSPIRENONE AND ETHINYL ESTRADIOL 0.02-3(28)
1 KIT ORAL DAILY
Qty: 84 TABLET | Refills: 4 | Status: SHIPPED | OUTPATIENT
Start: 2024-01-17

## 2024-01-17 RX ORDER — PROPRANOLOL HYDROCHLORIDE 80 MG/1
80 CAPSULE, EXTENDED RELEASE ORAL DAILY
Qty: 90 CAPSULE | Refills: 1 | Status: CANCELLED | OUTPATIENT
Start: 2024-01-17

## 2024-01-17 ASSESSMENT — ENCOUNTER SYMPTOMS
ABDOMINAL PAIN: 0
ARTHRALGIAS: 0
NAUSEA: 0
DIZZINESS: 0
SORE THROAT: 0
DYSURIA: 0
CONSTIPATION: 0
DIARRHEA: 0
FEVER: 0
HEADACHES: 0
SHORTNESS OF BREATH: 0
WEAKNESS: 0
PALPITATIONS: 0
FREQUENCY: 0
CHILLS: 0
HEMATURIA: 0
JOINT SWELLING: 0
EYE PAIN: 0
NERVOUS/ANXIOUS: 0
MYALGIAS: 0
COUGH: 0

## 2024-01-17 ASSESSMENT — PAIN SCALES - GENERAL: PAINLEVEL: NO PAIN (0)

## 2024-01-17 NOTE — PROGRESS NOTES
Preventive Care Visit  [facility]  Nicolas Loaiza MD, Family Medicine  Jan 17, 2024         SUBJECTIVE:   Adina is a 44 year old, presenting for the following:  Physical (Not fasting)        1/17/2024     8:49 AM   Additional Questions   Roomed by Sole Tong   Accompanied by PCA ( Graciela)         1/17/2024     8:49 AM   Patient Reported Additional Medications   Patient reports taking the following new medications none     Healthy Habits:     Getting at least 3 servings of Calcium per day:  Yes    Bi-annual eye exam:  Yes    Dental care twice a year:  Yes    Sleep apnea or symptoms of sleep apnea:  Daytime drowsiness and Sleep apnea    Diet:  Diabetic    Frequency of exercise:  None    Taking medications regularly:  No    Barriers to taking medications:  Other    Medication side effects:  None    Additional concerns today:  No    Today's PHQ-2 Score:       1/16/2024     1:50 PM   PHQ-2 ( 1999 [employer])   Q1: Little interest or pleasure in doing things 0   Q2: Feeling down, depressed or hopeless 0   PHQ-2 Score 0   Q1: Little interest or pleasure in doing things Not at all   Q2: Feeling down, depressed or hopeless Not at all   PHQ-2 Score 0           Medication Followup of  Psoriasis    Taking Medication as prescribed: yes- as needed  Side Effects:  None  Medication Helping Symptoms:  yes    Medication Followup of  Birth control   Taking Medication as prescribed: yes  Side Effects:  None  Medication Helping Symptoms:  yes    Hyperlipidemia Follow-Up    Are you regularly taking any medication or supplement to lower your cholesterol?   Yes- atorvastatin  Are you having muscle aches or other side effects that you think could be caused by your cholesterol lowering medication?  No    Depression Followup  How are you doing with your depression since your last visit? No change  Are you having other symptoms that might be associated with depression? No  Have you had a significant life  event?  No   Are you feeling anxious or having panic attacks?   No  Do you have any concerns with your use of alcohol or other drugs? No    Social History     Tobacco Use    Smoking status: Never    Smokeless tobacco: Never   Vaping Use    Vaping Use: Never used   Substance Use Topics    Alcohol use: Never    Drug use: Never          No data to display                   No data to display                  Suicide Assessment Five-step Evaluation and Treatment (SAFE-T)        Social History     Tobacco Use    Smoking status: Never    Smokeless tobacco: Never   Substance Use Topics    Alcohol use: Never             1/16/2024     1:49 PM   Alcohol Use   Prescreen: >3 drinks/day or >7 drinks/week? Not Applicable          No data to display              Reviewed orders with patient.  Reviewed health maintenance and updated orders accordingly - Yes  BP Readings from Last 3 Encounters:   01/17/24 (!) 86/56   10/04/23 96/58   05/03/23 92/66    Wt Readings from Last 3 Encounters:   01/17/24 85.5 kg (188 lb 6.4 oz)   10/04/23 83.2 kg (183 lb 6.4 oz)   05/23/23 87.7 kg (193 lb 6.4 oz)                    Breast Cancer Screening:    FHS-7:       8/9/2021    12:22 PM   Breast CA Risk Assessment (FHS-7)   Did any of your first-degree relatives have breast or ovarian cancer? Unknown   Did any of your relatives have bilateral breast cancer? Unknown   Did any man in your family have breast cancer? Unknown   Did any woman in your family have breast and ovarian cancer? Unknown   Did any woman in your family have breast cancer before age 50 y? Unknown   Do you have 2 or more relatives with breast and/or ovarian cancer? Unknown   Do you have 2 or more relatives with breast and/or bowel cancer? Unknown       Mammogram Screening: Recommended annual mammography  Pertinent mammograms are reviewed under the imaging tab.    History of abnormal Pap smear: NO - age 30-65 PAP every 5 years with negative HPV co-testing recommended     Reviewed and  "updated as needed this visit by clinical staff                  Reviewed and updated as needed this visit by Provider                      OBJECTIVE:   BP (!) 86/56 (BP Location: Right arm, Patient Position: Sitting, Cuff Size: Adult Large)   Pulse 72   Temp 97.2  F (36.2  C) (Tympanic)   Resp 16   Ht 1.664 m (5' 5.5\")   Wt 85.5 kg (188 lb 6.4 oz)   SpO2 97%   BMI 30.87 kg/m     Estimated body mass index is 30.06 kg/m  as calculated from the following:    Height as of 10/4/23: 1.664 m (5' 5.5\").    Weight as of 10/4/23: 83.2 kg (183 lb 6.4 oz).  Review of Systems   Constitutional:  Negative for chills and fever.   HENT:  Negative for congestion, ear pain, hearing loss and sore throat.    Eyes:  Negative for pain and visual disturbance.   Respiratory:  Negative for cough and shortness of breath.    Cardiovascular:  Negative for chest pain and palpitations.   Gastrointestinal:  Negative for abdominal pain, constipation, diarrhea and nausea.   Genitourinary:  Negative for dysuria, frequency, genital sores, hematuria, pelvic pain, urgency, vaginal bleeding and vaginal discharge.   Musculoskeletal:  Negative for arthralgias, joint swelling and myalgias.   Skin:  Negative for rash.   Neurological:  Negative for dizziness, weakness and headaches.   Psychiatric/Behavioral:  The patient is not nervous/anxious.      Physical Exam  GENERAL: alert and no distress  EYES: lazy eye. Eyes grossly normal to inspection, PERRL and conjunctivae and sclerae normal  HENT: right ear canal abrasion, healing. ear canals and TM's normal, nose and mouth without ulcers or lesions  NECK: no adenopathy, no asymmetry, masses, or scars  RESP: lungs clear to auscultation - no rales, rhonchi or wheezes  CV: regular rate and rhythm, normal S1 S2, no S3 or S4, no murmur, click or rub, no peripheral edema  ABDOMEN: soft, nontender, no hepatosplenomegaly, no masses and bowel sounds normal  : normal external genitalia, normal vaginal rugation, " normal cervix.  MS: no gross musculoskeletal defects noted, no edema  SKIN: beefy red rash in groin and abdominal folds. no suspicious lesions or rashes  NEURO: Normal strength and tone, mentation intact and speech normal  PSYCH: mentation appears normal, affect normal/bright  LYMPH: no cervical, supraclavicular, axillary, or inguinal adenopathy    Diagnostic Test Results:  Labs reviewed in Epic    ASSESSMENT/PLAN:   Routine general medical examination at a health care facility      Routine general medical exam at health care facility    Cervical cancer screening  Completed today  - Pap Screen with HPV - recommended age 30 - 65 years    Visit for screening mammogram  - MA SCREENING DIGITAL BILAT - Future  (s+30); Future    Bipolar 1 disorder (H): seeing psychiatry  Known issue that I take into account for their medical decisions, no current exacerbations or new concerns      High cholesterol  From medication, refill and recheck  - simvastatin (ZOCOR) 20 MG tablet; Take 1 tablet (20 mg) by mouth at bedtime    Borderline personality disorder (H): stable, following with psych and in group home.  Known issue that I take into account for their medical decisions, no current exacerbations or new concerns      Psoriatic arthritis (H)  Stable off treatment.    Encounter for therapeutic drug monitoring  Recheck today  - Hemoglobin A1c; Future  - Lipid panel reflex to direct LDL Non-fasting; Future  - Comprehensive metabolic panel (BMP + Alb, Alk Phos, ALT, AST, Total. Bili, TP); Future    Screening for lipid disorders  - Lipid panel reflex to direct LDL Non-fasting; Future    Screen for colon cancer  - COLOGUARD([employer]); Future    Encounter for surveillance of contraceptive pills  - drospirenone-ethinyl estradiol (LORYNA) 3-0.02 MG tablet; Take 1 tablet by mouth daily    Patient has been advised of split billing requirements and indicates understanding: Yes      Counseling  Reviewed preventive health counseling, as  reflected in patient instructions       Regular exercise       Healthy diet/nutrition       Vision screening       Osteoporosis prevention/bone health    Wt Readings from Last 4 Encounters:   01/17/24 85.5 kg (188 lb 6.4 oz)   10/04/23 83.2 kg (183 lb 6.4 oz)   05/23/23 87.7 kg (193 lb 6.4 oz)   05/03/23 80.1 kg (176 lb 9.6 oz)         She reports that she has never smoked. She has never used smokeless tobacco.          Signed Electronically by: Nicolas Loaiza MD

## 2024-01-17 NOTE — PATIENT INSTRUCTIONS
To lab    Cologard will be sent to you for colon cancer screening if covered by insurance.    Mammogram will call to schedule an appointment.    Increase activity this winter: walking at home types of exercises 3 days a week.    I sent refills for the simvastatin and birth control pill for the year.      This is a preventative visit and any additional concerns or chronic disease management including medication refills addressed today could be charged additionally.    Preventative visits screen for diseases prior to they occur.  They do not cover for any new diagnosis or chronic disease management.     If you have questions regarding your coverage please check with your insurance provider.  At [facility] we need to code correctly to be in compliance with all insurance companies.    Preventive Health Recommendations  Female Ages 40 to 49    Yearly exam:   See your health care provider every year in order to  Review health changes.   Discuss preventive care.    Review your medicines if your doctor prescribed any.    Get a Pap test every three years (unless you have an abnormal result and your provider advises testing more often).    If you get Pap tests with HPV test, you only need to test every 5 years, unless you have an abnormal result. You do not need a Pap test if your uterus was removed (hysterectomy) and you have not had cancer.    You should be tested each year for STDs (sexually transmitted diseases), if you're at risk.   Ask your doctor if you should have a mammogram.    Have a colonoscopy (test for colon cancer) beginning at age 45.  Ask your provider about other options like a yearly FIT test or Cologuard test every 3 years (stool tests)      Have a cholesterol test every 5 years.     Have a diabetes test (fasting glucose) after age 45. If you are at risk for diabetes, you should have this test every 3 years.    Shots: Get a flu shot each year. Get a tetanus shot every 10 years.     Nutrition:   Eat at least  5 servings of fruits and vegetables each day.  Eat whole-grain bread, whole-wheat pasta and brown rice instead of white grains and rice.  Get adequate Calcium and Vitamin D.      Lifestyle  Exercise at least 150 minutes a week (an average of 30 minutes a day, 5 days a week). This will help you control your weight and prevent disease.  Limit alcohol to one drink per day.  No smoking.   Wear sunscreen to prevent skin cancer.  See your dentist every six months for an exam and cleaning.

## 2024-01-18 ENCOUNTER — MYC MEDICAL ADVICE (OUTPATIENT)
Dept: FAMILY MEDICINE | Facility: CLINIC | Age: 44
End: 2024-01-18
Payer: COMMERCIAL

## 2024-01-18 DIAGNOSIS — R73.03 PRE-DIABETES: ICD-10-CM

## 2024-01-19 LAB
BKR LAB AP GYN ADEQUACY: NORMAL
BKR LAB AP GYN INTERPRETATION: NORMAL
BKR LAB AP HPV REFLEX: NORMAL
BKR LAB AP PREVIOUS ABNORMAL: NORMAL
PATH REPORT.COMMENTS IMP SPEC: NORMAL
PATH REPORT.COMMENTS IMP SPEC: NORMAL
PATH REPORT.RELEVANT HX SPEC: NORMAL

## 2024-01-19 RX ORDER — METFORMIN HCL 500 MG
TABLET, EXTENDED RELEASE 24 HR ORAL
Qty: 270 TABLET | Refills: 3 | Status: SHIPPED | OUTPATIENT
Start: 2024-01-19 | End: 2024-01-19

## 2024-01-19 RX ORDER — METFORMIN HCL 500 MG
TABLET, EXTENDED RELEASE 24 HR ORAL
Qty: 270 TABLET | Refills: 3 | Status: SHIPPED | OUTPATIENT
Start: 2024-01-19 | End: 2025-01-27

## 2024-01-19 NOTE — TELEPHONE ENCOUNTER
Script has been faxed to [facility] [phone]     Diane Lyles on 1/19/2024 at 9:21 AM

## 2024-01-22 LAB
HUMAN PAPILLOMA VIRUS 16 DNA: NEGATIVE
HUMAN PAPILLOMA VIRUS 18 DNA: NEGATIVE
HUMAN PAPILLOMA VIRUS FINAL DIAGNOSIS: NORMAL
HUMAN PAPILLOMA VIRUS OTHER HR: NEGATIVE

## 2024-01-28 ENCOUNTER — HEALTH MAINTENANCE LETTER (OUTPATIENT)
Age: 44
End: 2024-01-28

## 2024-02-21 LAB — NONINV COLON CA DNA+OCC BLD SCRN STL QL: NEGATIVE

## 2024-03-05 DIAGNOSIS — Z78.9 OTHER SPECIFIED HEALTH STATUS: ICD-10-CM

## 2024-03-05 RX ORDER — MULTIVIT-MIN/FA/LYCOPEN/LUTEIN .4-300-25
1 TABLET ORAL DAILY
Qty: 100 TABLET | Refills: 3 | Status: SHIPPED | OUTPATIENT
Start: 2024-03-05

## 2024-03-13 ENCOUNTER — TELEPHONE (OUTPATIENT)
Dept: FAMILY MEDICINE | Facility: CLINIC | Age: 44
End: 2024-03-13
Payer: COMMERCIAL

## 2024-03-13 DIAGNOSIS — J30.2 SEASONAL ALLERGIC RHINITIS, UNSPECIFIED TRIGGER: ICD-10-CM

## 2024-03-13 NOTE — LETTER
March 13, 2024      Tabitha Payton  [address] [facility] [address]        Dear Tabitha,     Your team at [facility] cares about your health. We have reviewed your chart and based on our findings; we are making the following recommendations to better manage your health.     You are in particular need of attention regarding the following:     Schedule Annual MAMMOGRAPHY. [facility] scheduling number is [phone] or schedule in [facility] (self referral).  1 in 8 women will develop invasive breast cancer during her lifetime and it is the most common non-skin cancer in [place]. EARLY detection, new treatments, and a better understanding of the disease have increased survival rates- the 5 year survival rate in the 1960's was 63% and today it is close to 90%.    If you have already completed these items, please contact the clinic via phone or   [facility] so your care team can review and update your records. Thank you for   choosing [facility] for your healthcare needs. For any questions,   concerns, or to schedule an appointment please contact our clinic.    Healthy Regards,      Your [facility] Care Team

## 2024-03-13 NOTE — TELEPHONE ENCOUNTER
Patient Quality Outreach    Patient is due for the following:   Breast Cancer Screening - Mammogram    Next Steps:   No follow up needed at this time.  Patient needs to complete a mammogram.    Type of outreach:    Sent letter.      Questions for provider review:    None           Sole Tong

## 2024-03-14 RX ORDER — CETIRIZINE HCL 10 MG/1
10 TABLET ORAL DAILY
Qty: 90 TABLET | Refills: 2 | Status: SHIPPED | OUTPATIENT
Start: 2024-03-14

## 2024-04-17 ENCOUNTER — HOSPITAL ENCOUNTER (OUTPATIENT)
Dept: MAMMOGRAPHY | Facility: CLINIC | Age: 44
Discharge: HOME | End: 2024-04-17
Attending: FAMILY MEDICINE | Admitting: FAMILY MEDICINE
Payer: COMMERCIAL

## 2024-04-17 DIAGNOSIS — Z12.31 VISIT FOR SCREENING MAMMOGRAM: ICD-10-CM

## 2024-04-17 PROCEDURE — 77063 BREAST TOMOSYNTHESIS BI: CPT

## 2024-04-20 NOTE — TELEPHONE ENCOUNTER
See note below, audiology order pended for PCP's review. Patient was last seen in clinic on 8/2/22.    Kathy Kraus RN  [facility]   None

## 2024-09-07 DIAGNOSIS — L40.9 PSORIASIS: ICD-10-CM

## 2024-09-07 DIAGNOSIS — K59.00 CONSTIPATION, UNSPECIFIED CONSTIPATION TYPE: ICD-10-CM

## 2024-09-09 RX ORDER — MOMETASONE FUROATE 1 MG/ML
SOLUTION TOPICAL DAILY PRN
Qty: 60 ML | Refills: 0 | Status: SHIPPED | OUTPATIENT
Start: 2024-09-09

## 2024-09-09 RX ORDER — POLYETHYLENE GLYCOL 3350 17 G/17G
POWDER, FOR SOLUTION ORAL
Qty: 510 G | Refills: 3 | Status: SHIPPED | OUTPATIENT
Start: 2024-09-09

## 2024-09-24 ENCOUNTER — OFFICE VISIT (OUTPATIENT)
Dept: AUDIOLOGY | Facility: CLINIC | Age: 44
End: 2024-09-24
Payer: COMMERCIAL

## 2024-09-24 DIAGNOSIS — H90.11 CONDUCTIVE HEARING LOSS OF RIGHT EAR WITH UNRESTRICTED HEARING OF LEFT EAR: Primary | ICD-10-CM

## 2024-09-24 PROCEDURE — 92557 COMPREHENSIVE HEARING TEST: CPT | Performed by: AUDIOLOGIST

## 2024-09-24 PROCEDURE — 92550 TYMPANOMETRY & REFLEX THRESH: CPT | Performed by: AUDIOLOGIST

## 2024-09-24 NOTE — PROGRESS NOTES
AUDIOLOGY REPORT    SUBJECTIVE:  Tabitha Payton is a 44 year old female who was seen in the Audiology Clinic at the [facility] for her annual group home audiologic evaluation. The patient has been seen previously in this clinic on 1/24/2023 for assessment and results indicated a mild conductive hearing loss in the right ear only.  The patient reports no noted change in her hearing. The patient denies  bilateral tinnitus, bilateral otalgia, and bilateral drainage.  The patient notes no difficulty with communication in a variety of listening situations.  They were accompanied today by their group home attendant.    OBJECTIVE:    Otoscopic exam indicates partial obstruction with cerumen in the right ear    Pure Tone Thresholds assessed using conventional audiometry with good  reliability from 250-8000 Hz bilaterally using insert earphones and circumaural headphones     RIGHT:  borderline-normal sloping to mild conductive hearing loss    LEFT:    normal hearing thresholds    Tympanogram:    RIGHT: hyper eardrum mobility (Type Ad)    LEFT:   hyper eardrum mobility (Type Ad)    Reflexes (reported by stimulus ear):  RIGHT: Ipsilateral is absent at frequencies tested  RIGHT: Contralateral is absent at frequencies tested  LEFT:   Ipsilateral is absent at frequencies tested  LEFT:   Contralateral is absent at frequencies tested  Note: TMs very noisy for reflex measurement    Speech Reception Threshold:    RIGHT: 20 dB HL    LEFT:   10 dB HL    Word Recognition Score:     RIGHT: 96% at 60 dB HL using NU-6 recorded word list.    LEFT:   100% at 50 dB HL using NU-6 recorded word list.      ASSESSMENT:   No diagnosis found.    Compared to patient's previous audiogram dated 1/24/2023, hearing has remained stable bilaterally.  Today s results were discussed with the patient in detail. All forms were filled out and a copy of the audiogram was given to the group home.    PLAN:  Patient was counseled  regarding hearing loss and impact on communication.   It is recommended that the patient return for annual hearing evaluations.  Please call this clinic with questions regarding these results or recommendations.        Jaida KIRAN-VIRGEN, #[id number]

## 2024-11-10 DIAGNOSIS — J30.2 SEASONAL ALLERGIC RHINITIS, UNSPECIFIED TRIGGER: ICD-10-CM

## 2024-11-11 RX ORDER — CETIRIZINE HYDROCHLORIDE 10 MG/1
10 TABLET ORAL DAILY
Qty: 90 TABLET | Refills: 0 | Status: SHIPPED | OUTPATIENT
Start: 2024-11-11

## 2025-01-12 SDOH — HEALTH STABILITY: PHYSICAL HEALTH: ON AVERAGE, HOW MANY DAYS PER WEEK DO YOU ENGAGE IN MODERATE TO STRENUOUS EXERCISE (LIKE A BRISK WALK)?: 1 DAY

## 2025-01-12 SDOH — HEALTH STABILITY: PHYSICAL HEALTH: ON AVERAGE, HOW MANY MINUTES DO YOU ENGAGE IN EXERCISE AT THIS LEVEL?: 20 MIN

## 2025-01-12 ASSESSMENT — SOCIAL DETERMINANTS OF HEALTH (SDOH): HOW OFTEN DO YOU GET TOGETHER WITH FRIENDS OR RELATIVES?: ONCE A WEEK

## 2025-01-14 DIAGNOSIS — L30.9 DERMATITIS, UNSPECIFIED: ICD-10-CM

## 2025-01-14 RX ORDER — MINERAL OIL/HYDROPHIL PETROLAT
OINTMENT (GRAM) TOPICAL DAILY PRN
Qty: 454 G | Refills: 0 | Status: SHIPPED | OUTPATIENT
Start: 2025-01-14 | End: 2025-01-15

## 2025-01-15 ENCOUNTER — OFFICE VISIT (OUTPATIENT)
Dept: FAMILY MEDICINE | Facility: CLINIC | Age: 45
End: 2025-01-15
Payer: COMMERCIAL

## 2025-01-15 VITALS
HEART RATE: 63 BPM | RESPIRATION RATE: 20 BRPM | SYSTOLIC BLOOD PRESSURE: 90 MMHG | DIASTOLIC BLOOD PRESSURE: 60 MMHG | OXYGEN SATURATION: 97 % | TEMPERATURE: 97 F | WEIGHT: 198.2 LBS | HEIGHT: 65 IN | BODY MASS INDEX: 33.02 KG/M2

## 2025-01-15 DIAGNOSIS — L30.9 DERMATITIS: ICD-10-CM

## 2025-01-15 DIAGNOSIS — R73.03 PRE-DIABETES: ICD-10-CM

## 2025-01-15 DIAGNOSIS — Z00.00 ROUTINE GENERAL MEDICAL EXAMINATION AT A HEALTH CARE FACILITY: Primary | ICD-10-CM

## 2025-01-15 DIAGNOSIS — F60.3 BORDERLINE PERSONALITY DISORDER (H): ICD-10-CM

## 2025-01-15 DIAGNOSIS — L40.50 PSORIATIC ARTHRITIS (H): ICD-10-CM

## 2025-01-15 DIAGNOSIS — F31.9 BIPOLAR 1 DISORDER (H): ICD-10-CM

## 2025-01-15 DIAGNOSIS — K59.00 CONSTIPATION, UNSPECIFIED CONSTIPATION TYPE: ICD-10-CM

## 2025-01-15 DIAGNOSIS — E78.00 HIGH CHOLESTEROL: ICD-10-CM

## 2025-01-15 DIAGNOSIS — L40.9 PSORIASIS: ICD-10-CM

## 2025-01-15 DIAGNOSIS — J30.2 SEASONAL ALLERGIC RHINITIS, UNSPECIFIED TRIGGER: ICD-10-CM

## 2025-01-15 DIAGNOSIS — Z78.9 OTHER SPECIFIED HEALTH STATUS: ICD-10-CM

## 2025-01-15 DIAGNOSIS — Z30.41 ENCOUNTER FOR SURVEILLANCE OF CONTRACEPTIVE PILLS: ICD-10-CM

## 2025-01-15 LAB
ALBUMIN SERPL BCG-MCNC: 4 G/DL (ref 3.5–5.2)
ALP SERPL-CCNC: 98 U/L (ref 40–150)
ALT SERPL W P-5'-P-CCNC: 24 U/L (ref 0–50)
ANION GAP SERPL CALCULATED.3IONS-SCNC: 11 MMOL/L (ref 7–15)
AST SERPL W P-5'-P-CCNC: 23 U/L (ref 0–45)
BILIRUB SERPL-MCNC: <0.2 MG/DL
BUN SERPL-MCNC: 11.8 MG/DL (ref 6–20)
CALCIUM SERPL-MCNC: 9.7 MG/DL (ref 8.8–10.4)
CHLORIDE SERPL-SCNC: 105 MMOL/L (ref 98–107)
CHOLEST SERPL-MCNC: 174 MG/DL
CREAT SERPL-MCNC: 0.74 MG/DL (ref 0.51–0.95)
EGFRCR SERPLBLD CKD-EPI 2021: >90 ML/MIN/1.73M2
ERYTHROCYTE [DISTWIDTH] IN BLOOD BY AUTOMATED COUNT: 13.8 % (ref 10–15)
EST. AVERAGE GLUCOSE BLD GHB EST-MCNC: 111 MG/DL
FASTING STATUS PATIENT QL REPORTED: YES
FASTING STATUS PATIENT QL REPORTED: YES
GLUCOSE SERPL-MCNC: 97 MG/DL (ref 70–99)
HBA1C MFR BLD: 5.5 % (ref 0–5.6)
HCO3 SERPL-SCNC: 24 MMOL/L (ref 22–29)
HCT VFR BLD AUTO: 38.9 % (ref 35–47)
HDLC SERPL-MCNC: 56 MG/DL
HGB BLD-MCNC: 12.6 G/DL (ref 11.7–15.7)
LDLC SERPL CALC-MCNC: 64 MG/DL
MCH RBC QN AUTO: 26.3 PG (ref 26.5–33)
MCHC RBC AUTO-ENTMCNC: 32.4 G/DL (ref 31.5–36.5)
MCV RBC AUTO: 81 FL (ref 78–100)
NONHDLC SERPL-MCNC: 118 MG/DL
PLATELET # BLD AUTO: 315 10E3/UL (ref 150–450)
POTASSIUM SERPL-SCNC: 4.1 MMOL/L (ref 3.4–5.3)
PROT SERPL-MCNC: 7.2 G/DL (ref 6.4–8.3)
RBC # BLD AUTO: 4.79 10E6/UL (ref 3.8–5.2)
SODIUM SERPL-SCNC: 140 MMOL/L (ref 135–145)
TRIGL SERPL-MCNC: 271 MG/DL
WBC # BLD AUTO: 8.8 10E3/UL (ref 4–11)

## 2025-01-15 PROCEDURE — G2211 COMPLEX E/M VISIT ADD ON: HCPCS | Performed by: FAMILY MEDICINE

## 2025-01-15 PROCEDURE — 99396 PREV VISIT EST AGE 40-64: CPT | Performed by: FAMILY MEDICINE

## 2025-01-15 PROCEDURE — 80061 LIPID PANEL: CPT | Performed by: FAMILY MEDICINE

## 2025-01-15 PROCEDURE — 83036 HEMOGLOBIN GLYCOSYLATED A1C: CPT | Performed by: FAMILY MEDICINE

## 2025-01-15 PROCEDURE — 80053 COMPREHEN METABOLIC PANEL: CPT | Performed by: FAMILY MEDICINE

## 2025-01-15 PROCEDURE — 99214 OFFICE O/P EST MOD 30 MIN: CPT | Mod: 25 | Performed by: FAMILY MEDICINE

## 2025-01-15 PROCEDURE — 36415 COLL VENOUS BLD VENIPUNCTURE: CPT | Performed by: FAMILY MEDICINE

## 2025-01-15 PROCEDURE — 85027 COMPLETE CBC AUTOMATED: CPT | Performed by: FAMILY MEDICINE

## 2025-01-15 PROCEDURE — 96127 BRIEF EMOTIONAL/BEHAV ASSMT: CPT | Performed by: FAMILY MEDICINE

## 2025-01-15 RX ORDER — MULTIVIT-MIN/FA/LYCOPEN/LUTEIN .4-300-25
1 TABLET ORAL DAILY
Qty: 100 TABLET | Refills: 3 | Status: SHIPPED | OUTPATIENT
Start: 2025-01-15

## 2025-01-15 RX ORDER — TRIAMCINOLONE ACETONIDE 1 MG/G
CREAM TOPICAL 3 TIMES DAILY PRN
Qty: 30 G | Refills: 1 | Status: SHIPPED | OUTPATIENT
Start: 2025-01-15

## 2025-01-15 RX ORDER — TEMAZEPAM 15 MG/1
15 CAPSULE ORAL AT BEDTIME
COMMUNITY

## 2025-01-15 RX ORDER — METFORMIN HYDROCHLORIDE 500 MG/1
1500 TABLET, EXTENDED RELEASE ORAL
Qty: 270 TABLET | Refills: 3 | Status: SHIPPED | OUTPATIENT
Start: 2025-01-15

## 2025-01-15 RX ORDER — MINERAL OIL/HYDROPHIL PETROLAT
OINTMENT (GRAM) TOPICAL DAILY PRN
Qty: 420 G | Refills: 3 | Status: SHIPPED | OUTPATIENT
Start: 2025-01-15

## 2025-01-15 RX ORDER — PROPRANOLOL HYDROCHLORIDE 80 MG/1
80 CAPSULE, EXTENDED RELEASE ORAL DAILY
Qty: 90 CAPSULE | Refills: 3 | Status: SHIPPED | OUTPATIENT
Start: 2025-01-15

## 2025-01-15 RX ORDER — POLYETHYLENE GLYCOL 3350 17 G/17G
POWDER, FOR SOLUTION ORAL
Qty: 510 G | Refills: 3 | Status: SHIPPED | OUTPATIENT
Start: 2025-01-15

## 2025-01-15 RX ORDER — TRIAMCINOLONE ACETONIDE 1 MG/G
OINTMENT TOPICAL 3 TIMES DAILY PRN
Qty: 80 G | Refills: 4 | Status: CANCELLED | OUTPATIENT
Start: 2025-01-15

## 2025-01-15 RX ORDER — DROSPIRENONE AND ETHINYL ESTRADIOL 0.02-3(28)
1 KIT ORAL DAILY
Qty: 84 TABLET | Refills: 4 | Status: SHIPPED | OUTPATIENT
Start: 2025-01-15

## 2025-01-15 RX ORDER — MOMETASONE FUROATE 1 MG/ML
SOLUTION TOPICAL DAILY PRN
Qty: 60 ML | Refills: 0 | Status: SHIPPED | OUTPATIENT
Start: 2025-01-15

## 2025-01-15 RX ORDER — MINERAL OIL/HYDROPHIL PETROLAT
OINTMENT (GRAM) TOPICAL DAILY PRN
Qty: 454 G | Refills: 0 | Status: SHIPPED | OUTPATIENT
Start: 2025-01-15

## 2025-01-15 RX ORDER — SIMVASTATIN 20 MG
20 TABLET ORAL AT BEDTIME
Qty: 90 TABLET | Refills: 3 | Status: SHIPPED | OUTPATIENT
Start: 2025-01-15

## 2025-01-15 RX ORDER — MONTELUKAST SODIUM 10 MG/1
10 TABLET ORAL AT BEDTIME
Qty: 90 TABLET | Refills: 3 | Status: SHIPPED | OUTPATIENT
Start: 2025-01-15

## 2025-01-15 RX ORDER — CETIRIZINE HYDROCHLORIDE 10 MG/1
10 TABLET ORAL DAILY
Qty: 90 TABLET | Refills: 0 | Status: CANCELLED | OUTPATIENT
Start: 2025-01-15

## 2025-01-15 ASSESSMENT — PAIN SCALES - GENERAL: PAINLEVEL_OUTOF10: NO PAIN (0)

## 2025-01-15 ASSESSMENT — ANXIETY QUESTIONNAIRES: GAD7 TOTAL SCORE: INCOMPLETE

## 2025-01-15 NOTE — PATIENT INSTRUCTIONS
Patient Education   Preventive Care Advice   This is general advice given by our system to help you stay healthy. However, your care team may have specific advice just for you. Please talk to your care team about your preventive care needs.  Nutrition  Eat 5 or more servings of fruits and vegetables each day.  Try wheat bread, brown rice and whole grain pasta (instead of white bread, rice, and pasta).  Get enough calcium and vitamin D. Check the label on foods and aim for 100% of the RDA (recommended daily allowance).  Lifestyle  Exercise at least 150 minutes each week  (30 minutes a day, 5 days a week).  Do muscle strengthening activities 2 days a week. These help control your weight and prevent disease.  No smoking.  Wear sunscreen to prevent skin cancer.  Have a dental exam and cleaning every 6 months.  Yearly exams  See your health care team every year to talk about:  Any changes in your health.  Any medicines your care team has prescribed.  Preventive care, family planning, and ways to prevent chronic diseases.  Shots (vaccines)   HPV shots (up to age 26), if you've never had them before.  Hepatitis B shots (up to age 59), if you've never had them before.  COVID-19 shot: Get this shot when it's due.  Flu shot: Get a flu shot every year.  Tetanus shot: Get a tetanus shot every 10 years.  Pneumococcal, hepatitis A, and RSV shots: Ask your care team if you need these based on your risk.  Shingles shot (for age 50 and up)  General health tests  Diabetes screening:  Starting at age 35, Get screened for diabetes at least every 3 years.  If you are younger than age 35, ask your care team if you should be screened for diabetes.  Cholesterol test: At age 39, start having a cholesterol test every 5 years, or more often if advised.  Bone density scan (DEXA): At age 50, ask your care team if you should have this scan for osteoporosis (brittle bones).  Hepatitis C: Get tested at least once in your life.  STIs (sexually  transmitted infections)  Before age 24: Ask your care team if you should be screened for STIs.  After age 24: Get screened for STIs if you're at risk. You are at risk for STIs (including HIV) if:  You are sexually active with more than one person.  You don't use condoms every time.  You or a partner was diagnosed with a sexually transmitted infection.  If you are at risk for HIV, ask about PrEP medicine to prevent HIV.  Get tested for HIV at least once in your life, whether you are at risk for HIV or not.  Cancer screening tests  Cervical cancer screening: If you have a cervix, begin getting regular cervical cancer screening tests starting at age 21.  Breast cancer scan (mammogram): If you've ever had breasts, begin having regular mammograms starting at age 40. This is a scan to check for breast cancer.  Colon cancer screening: It is important to start screening for colon cancer at age 45.  Have a colonoscopy test every 10 years (or more often if you're at risk) Or, ask your provider about stool tests like a FIT test every year or Cologuard test every 3 years.  To learn more about your testing options, visit:   .  For help making a decision, visit:   https://bit.ly/hk37192.  Prostate cancer screening test: If you have a prostate, ask your care team if a prostate cancer screening test (PSA) at age 55 is right for you.  Lung cancer screening: If you are a current or former smoker ages 50 to 80, ask your care team if ongoing lung cancer screenings are right for you.  For informational purposes only. Not to replace the advice of your health care provider. Copyright   2023 [facility]. All rights reserved. Clinically reviewed by the [facility]. [employer] 234409 - REV 01/24.  Learning About Stress  What is stress?     Stress is your body's response to a hard situation. Your body can have a physical, emotional, or mental response. Stress is a fact of life for most people, and it  affects everyone differently. What causes stress for you may not be stressful for someone else.  A lot of things can cause stress. You may feel stress when you go on a job interview, take a test, or run a race. This kind of short-term stress is normal and even useful. It can help you if you need to work hard or react quickly. For example, stress can help you finish an important job on time.  Long-term stress is caused by ongoing stressful situations or events. Examples of long-term stress include long-term health problems, ongoing problems at work, or conflicts in your family. Long-term stress can harm your health.  How does stress affect your health?  When you are stressed, your body responds as though you are in danger. It makes hormones that speed up your heart, make you breathe faster, and give you a burst of energy. This is called the fight-or-flight stress response. If the stress is over quickly, your body goes back to normal and no harm is done.  But if stress happens too often or lasts too long, it can have bad effects. Long-term stress can make you more likely to get sick, and it can make symptoms of some diseases worse. If you tense up when you are stressed, you may develop neck, shoulder, or low back pain. Stress is linked to high blood pressure and heart disease.  Stress also harms your emotional health. It can make you gaming, tense, or depressed. Your relationships may suffer, and you may not do well at work or school.  What can you do to manage stress?  You can try these things to help manage stress:   Do something active. Exercise or activity can help reduce stress. Walking is a great way to get started. Even everyday activities such as housecleaning or yard work can help.  Try yoga or ashlyn chi. These techniques combine exercise and meditation. You may need some training at first to learn them.  Do something you enjoy. For example, listen to music or go to a movie. Practice your hobby or do volunteer  "work.  Meditate. This can help you relax, because you are not worrying about what happened before or what may happen in the future.  Do guided imagery. Imagine yourself in any setting that helps you feel calm. You can use online videos, books, or a teacher to guide you.  Do breathing exercises. For example:  From a standing position, bend forward from the waist with your knees slightly bent. Let your arms dangle close to the floor.  Breathe in slowly and deeply as you return to a standing position. Roll up slowly and lift your head last.  Hold your breath for just a few seconds in the standing position.  Breathe out slowly and bend forward from the waist.  Let your feelings out. Talk, laugh, cry, and express anger when you need to. Talking with supportive friends or family, a counselor, or a isaac leader about your feelings is a healthy way to relieve stress. Avoid discussing your feelings with people who make you feel worse.  Write. It may help to write about things that are bothering you. This helps you find out how much stress you feel and what is causing it. When you know this, you can find better ways to cope.  What can you do to prevent stress?  You might try some of these things to help prevent stress:  Manage your time. This helps you find time to do the things you want and need to do.  Get enough sleep. Your body recovers from the stresses of the day while you are sleeping.  Get support. Your family, friends, and community can make a difference in how you experience stress.  Limit your news feed. Avoid or limit time on social media or news that may make you feel stressed.  Do something active. Exercise or activity can help reduce stress. Walking is a great way to get started.  Where can you learn more?  Go to https://www.[employer].net/patiented  Enter N032 in the search box to learn more about \"Learning About Stress.\"  Current as of: October 24, 2023  Content Version: 14.3    2024 [employer]. "   Care instructions adapted under license by your healthcare professional. If you have questions about a medical condition or this instruction, always ask your healthcare professional. [employer] disclaims any warranty or liability for your use of this information.

## 2025-01-15 NOTE — PROGRESS NOTES
Preventive Care Visit  [facility]  Nicolas Loaiza MD, Family Medicine  Francisco 15, 2025      Assessment & Plan     Routine general medical examination at a health care facility  Forms signed.    Bipolar 1 disorder (H)  Stable, following with psychiatry, declines PHQ 9 ad DOLORES 7 today as she does this with psychiatry.  - propranolol ER (INDERAL LA) 80 MG 24 hr capsule; Take 1 capsule (80 mg) by mouth daily.  - Comprehensive metabolic panel (BMP + Alb, Alk Phos, ALT, AST, Total. Bili, TP); Future  - CBC with platelets; Future    High cholesterol  Recheck and refill.   - Lipid panel reflex to direct LDL Non-fasting; Future  - simvastatin (ZOCOR) 20 MG tablet; Take 1 tablet (20 mg) by mouth at bedtime.  - Comprehensive metabolic panel (BMP + Alb, Alk Phos, ALT, AST, Total. Bili, TP); Future    Seasonal allergic rhinitis, unspecified trigger  Not well controlled, switch off zyrtec and start singulair.  If the allergies are not well controlled MyChart, add zyrtec back.  - montelukast (SINGULAIR) 10 MG tablet; Take 1 tablet (10 mg) by mouth at bedtime.    Encounter for surveillance of contraceptive pills  Stable, continue.  - drospirenone-ethinyl estradiol (LORYNA) 3-0.02 MG tablet; Take 1 tablet by mouth daily.    Pre-diabetes  Recheck today.  - metFORMIN (GLUCOPHAGE XR) 500 MG 24 hr tablet; Take 3 tablets (1,500 mg) by mouth daily (with lunch).  - Hemoglobin A1c; Future  - Comprehensive metabolic panel (BMP + Alb, Alk Phos, ALT, AST, Total. Bili, TP); Future    Dermatitis  Fair control, but doesn't like the triamcinolone ointment, switch to cream.  - Mineral Oil-Hydrophil Petrolat OINT; Apply topically daily as needed. To any area of skin irritated.  - mineral oil-hydrophilic petrolatum (AQUAPHOR) external ointment; Apply topically daily as needed for irritation.  - triamcinolone (KENALOG) 0.1 % external cream; Apply topically 3 times daily as needed for irritation. Apply to hands/fingers  -  "Comprehensive metabolic panel (BMP + Alb, Alk Phos, ALT, AST, Total. Bili, TP); Future    Psoriasis  stable  - Mineral Oil-Hydrophil Petrolat OINT; Apply topically daily as needed. To any area of skin irritated.  - mometasone (ELOCON) 0.1 % external solution; Apply topically daily as needed (scalp itching/dryness). Apply to scalp and massage at bedtime and wash out in morning.  - triamcinolone (KENALOG) 0.1 % external cream; Apply topically 3 times daily as needed for irritation. Apply to hands/fingers    Psoriatic arthritis (H)  stable    Other specified health status  - Multiple Vitamins-Minerals (CEROVITE SENIOR) TABS; Take 1 tablet by mouth daily.    Constipation, unspecified constipation type  Stable, rare use  - polyethylene glycol (SM CLEARLAX) 17 GM/Dose powder; Mix 1/2 to 1 full capful daily as needed for constipation or constipation prevention in 8 oz of liquid and drink.    Borderline personality disorder (H)  Following with psychiatry.    Patient has been advised of split billing requirements and indicates understanding: Yes    The longitudinal plan of care for the diagnosis(es)/condition(s) as documented were addressed during this visit. Due to the added complexity in care, I will continue to support Adina in the subsequent management and with ongoing continuity of care.    BMI  Estimated body mass index is 32.98 kg/m  as calculated from the following:    Height as of this encounter: 1.651 m (5' 5\").    Weight as of this encounter: 89.9 kg (198 lb 3.2 oz).   Weight management plan: Discussed healthy diet and exercise guidelines    Counseling  Appropriate preventive services were addressed with this patient via screening, questionnaire, or discussion as appropriate for fall prevention, nutrition, physical activity, Tobacco-use cessation, social engagement, weight loss and cognition.  Checklist reviewing preventive services available has been given to the patient.  Reviewed patient's diet, addressing " concerns and/or questions.   She is at risk for lack of exercise and has been provided with information to increase physical activity for the benefit of her well-being.   She is at risk for psychosocial distress and has been provided with information to reduce risk.       See Patient Instructions    Jonna Holden is a 45 year old, presenting for the following:  Physical        1/15/2025     8:43 AM   Additional Questions   Roomed by Sole Tong   Accompanied by Graciela         1/15/2025   Forms   Any forms needing to be completed Yes         1/15/2025     8:43 AM   Patient Reported Additional Medications   Patient reports taking the following new medications none          HPI  Sneezing more often. Doesn't seem like zytec isn't working.  Claritin didn't work well. Allegra didn't help.  Did flonase which irritates throat.    Depression   How are you doing with your depression since your last visit? Stable  Are you having other symptoms that might be associated with depression? No  Have you had a significant life event?  No   Are you feeling anxious or having panic attacks?   No  Do you have any concerns with your use of alcohol or other drugs? No    Social History     Tobacco Use    Smoking status: Never    Smokeless tobacco: Never   Vaping Use    Vaping status: Never Used   Substance Use Topics    Alcohol use: Never    Drug use: Never          No data to display                  1/15/2025     8:37 AM   DOLORES-7 SCORE   Total Score Incomplete         Suicide Assessment Five-step Evaluation and Treatment (SAFE-T)      Hyperlipidemia Follow-Up    Are you regularly taking any medication or supplement to lower your cholesterol?   Yes- Simvastatin  Are you having muscle aches or other side effects that you think could be caused by your cholesterol lowering medication?  No    Medication Followup of Birth control  Taking Medication as prescribed: yes  Side Effects:  None  Medication Helping Symptoms:  yes    Medication  Followup of Metformin  Taking Medication as prescribed: yes  Side Effects:  None  Medication Helping Symptoms:  yes    Medication Followup of psoriasis with Ointments and solutions (kenalog,  Elocon, aquaphor)  Taking Medication as prescribed: yes  Side Effects:  None  Medication Helping Symptoms:  yes      Health Care Directive  Patient has a Health Care Directive on file  Advance care planning document is on file and is current.      1/12/2025   General Health   How would you rate your overall physical health? Good   Feel stress (tense, anxious, or unable to sleep) To some extent   (!) STRESS CONCERN      1/12/2025   Nutrition   Three or more servings of calcium each day? Yes   Diet: Diabetic   How many servings of fruit and vegetables per day? (!) 2-3   How many sweetened beverages each day? 0-1         1/12/2025   Exercise   Days per week of moderate/strenous exercise 1 day   Average minutes spent exercising at this level 20 min   (!) EXERCISE CONCERN      1/12/2025   Social Factors   Frequency of gathering with friends or relatives Once a week   Worry food won't last until get money to buy more No   Food not last or not have enough money for food? No   Do you have housing? (Housing is defined as stable permanent housing and does not include staying ouside in a car, in a tent, in an abandoned building, in an overnight shelter, or couch-surfing.) Yes   Are you worried about losing your housing? No   Lack of transportation? No   Unable to get utilities (heat,electricity)? No         1/12/2025   Dental   Dentist two times every year? Yes         1/12/2025   TB Screening   Were you born outside of the [place]? No       Today's PHQ-9 Score:       1/15/2025     8:37 AM   PHQ-9 SCORE   PHQ-9 Total Score MyChart Incomplete         1/12/2025   Substance Use   Alcohol more than 3/day or more than 7/wk Not Applicable   Do you use any other substances recreationally? No     Social History     Tobacco Use    Smoking status: Never     Smokeless tobacco: Never   Vaping Use    Vaping status: Never Used   Substance Use Topics    Alcohol use: Never    Drug use: Never           8/9/2021   LAST FHS-7 RESULTS   1st degree relative breast or ovarian cancer Unknown   Any relative bilateral breast cancer Unknown   Any male have breast cancer Unknown   Any ONE woman have BOTH breast AND ovarian cancer Unknown   Any woman with breast cancer before 50yrs Unknown   2 or more relatives with breast AND/OR ovarian cancer Unknown   2 or more relatives with breast AND/OR bowel cancer Unknown        Mammogram Screening - Mammogram every 1-2 years updated in Health Maintenance based on mutual decision making        1/12/2025   STI Screening   New sexual partner(s) since last STI/HIV test? No     History of abnormal Pap smear: No - age 30- 64 PAP with HPV every 5 years recommended        Latest Ref Rng & Units 1/17/2024     9:49 AM   PAP / HPV   PAP  Negative for Intraepithelial Lesion or Malignancy (NILM)    HPV 16 DNA Negative Negative    HPV 18 DNA Negative Negative    Other HR HPV Negative Negative      ASCVD Risk   The ASCVD Risk score (Bettie HUNTER, et al., 2019) failed to calculate for the following reasons:    The valid systolic blood pressure range is 90 to 200 mmHg        1/12/2025   Contraception/Family Planning   Questions about contraception or family planning No        Reviewed and updated as needed this visit by Provider                    BP Readings from Last 3 Encounters:   01/15/25 90/60   01/17/24 (!) 86/56   10/04/23 96/58    Wt Readings from Last 3 Encounters:   01/15/25 89.9 kg (198 lb 3.2 oz)   01/17/24 85.5 kg (188 lb 6.4 oz)   10/04/23 83.2 kg (183 lb 6.4 oz)              Review of Systems  Constitutional, HEENT, cardiovascular, pulmonary, gi and gu systems are negative, except as otherwise noted.     Objective    Exam  BP 90/60 (BP Location: Right arm, Patient Position: Sitting, Cuff Size: Adult Large)   Pulse 63   Temp 97  F  "(36.1  C) (Tympanic)   Resp 20   Ht 1.651 m (5' 5\")   Wt 89.9 kg (198 lb 3.2 oz)   SpO2 97%   Breastfeeding No   BMI 32.98 kg/m     Estimated body mass index is 32.98 kg/m  as calculated from the following:    Height as of this encounter: 1.651 m (5' 5\").    Weight as of this encounter: 89.9 kg (198 lb 3.2 oz).    Physical Exam  GENERAL: alert and no distress  EYES: Eyes grossly normal to inspection, PERRL and conjunctivae and sclerae normal  HENT: ear canals and TM's normal, nose and mouth without ulcers or lesions  NECK: no adenopathy, no asymmetry, masses, or scars  RESP: lungs clear to auscultation - no rales, rhonchi or wheezes  CV: regular rate and rhythm, normal S1 S2, no S3 or S4, no murmur, click or rub, no peripheral edema  ABDOMEN: soft, nontender, no hepatosplenomegaly, no masses and bowel sounds normal  MS: no gross musculoskeletal defects noted, no edema  SKIN: dryness on the right and third finger.  NEURO: Normal strength and tone, mentation intact and speech normal  PSYCH: mentation appears normal, affect normal/bright        Signed Electronically by: Nicolas Loaiza MD    "

## 2025-01-22 ENCOUNTER — MYC MEDICAL ADVICE (OUTPATIENT)
Dept: FAMILY MEDICINE | Facility: CLINIC | Age: 45
End: 2025-01-22
Payer: COMMERCIAL

## 2025-01-22 NOTE — TELEPHONE ENCOUNTER
Please see Marcandi message.  Would you like this cream discontinued or to leave as PRN?    Thank you    Shey JIMENEZ RN

## 2025-01-22 NOTE — LETTER
January 23, 2025      RE: Tabitha Payton  [address] [address] [facility] [address]        To Whom It May Concern,     Please discontinue nystatin cream.            Nicolas Loaiza MD    Electronically signed

## 2025-02-01 DIAGNOSIS — E78.00 HIGH CHOLESTEROL: ICD-10-CM

## 2025-02-01 DIAGNOSIS — Z30.41 ENCOUNTER FOR SURVEILLANCE OF CONTRACEPTIVE PILLS: ICD-10-CM

## 2025-02-01 DIAGNOSIS — R73.03 PRE-DIABETES: ICD-10-CM

## 2025-02-03 RX ORDER — METFORMIN HYDROCHLORIDE 500 MG/1
TABLET, EXTENDED RELEASE ORAL
Qty: 84 TABLET | Refills: 13 | OUTPATIENT
Start: 2025-02-03

## 2025-02-03 RX ORDER — DROSPIRENONE AND ETHINYL ESTRADIOL 0.02-3(28)
1 KIT ORAL DAILY
Qty: 28 TABLET | Refills: 13 | OUTPATIENT
Start: 2025-02-03

## 2025-02-03 RX ORDER — SIMVASTATIN 20 MG
20 TABLET ORAL AT BEDTIME
Qty: 28 TABLET | Refills: 13 | OUTPATIENT
Start: 2025-02-03

## 2025-02-10 DIAGNOSIS — J30.2 SEASONAL ALLERGIC RHINITIS, UNSPECIFIED TRIGGER: ICD-10-CM

## 2025-02-11 DIAGNOSIS — L30.9 DERMATITIS: ICD-10-CM

## 2025-02-11 RX ORDER — CETIRIZINE HYDROCHLORIDE 10 MG/1
10 TABLET ORAL DAILY
Qty: 28 TABLET | Refills: 5 | Status: SHIPPED | OUTPATIENT
Start: 2025-02-11

## 2025-02-11 NOTE — TELEPHONE ENCOUNTER
Pending Prescriptions:                       Disp   Refills    mineral oil-hydrophilic petrolatum (AQUAP*420 g  3            Sig: Apply topically daily as needed for irritation.

## 2025-02-12 RX ORDER — MINERAL OIL/HYDROPHIL PETROLAT
OINTMENT (GRAM) TOPICAL DAILY PRN
Qty: 420 G | Refills: 3 | OUTPATIENT
Start: 2025-02-12